# Patient Record
Sex: MALE | Race: OTHER | HISPANIC OR LATINO | Employment: UNEMPLOYED | URBAN - METROPOLITAN AREA
[De-identification: names, ages, dates, MRNs, and addresses within clinical notes are randomized per-mention and may not be internally consistent; named-entity substitution may affect disease eponyms.]

---

## 2021-08-12 ENCOUNTER — HOSPITAL ENCOUNTER (INPATIENT)
Facility: HOSPITAL | Age: 26
LOS: 1 days | Discharge: HOME | DRG: 245 | End: 2021-08-13
Attending: EMERGENCY MEDICINE | Admitting: INTERNAL MEDICINE
Payer: COMMERCIAL

## 2021-08-12 ENCOUNTER — APPOINTMENT (EMERGENCY)
Dept: CT IMAGING | Facility: HOSPITAL | Age: 26
DRG: 245 | End: 2021-08-12
Payer: COMMERCIAL

## 2021-08-12 DIAGNOSIS — K52.9 PROCTOCOLITIS: Primary | ICD-10-CM

## 2021-08-12 DIAGNOSIS — Z59.00 HOMELESS: ICD-10-CM

## 2021-08-12 DIAGNOSIS — R11.0 NAUSEA: ICD-10-CM

## 2021-08-12 DIAGNOSIS — F19.10 POLYSUBSTANCE ABUSE (HCC): ICD-10-CM

## 2021-08-12 DIAGNOSIS — R93.5 ABNORMAL ABDOMINAL CT SCAN: ICD-10-CM

## 2021-08-12 DIAGNOSIS — R10.9 ABDOMINAL PAIN: ICD-10-CM

## 2021-08-12 DIAGNOSIS — R11.2 INTRACTABLE NAUSEA AND VOMITING: ICD-10-CM

## 2021-08-12 PROBLEM — K62.5 COLITIS WITH RECTAL BLEEDING: Status: ACTIVE | Noted: 2021-08-12

## 2021-08-12 PROBLEM — E87.6 HYPOKALEMIA: Status: ACTIVE | Noted: 2021-08-12

## 2021-08-12 PROBLEM — R79.89 ABNORMAL LFTS: Status: ACTIVE | Noted: 2021-08-12

## 2021-08-12 LAB
ALBUMIN SERPL BCP-MCNC: 4.4 G/DL (ref 3–5.2)
ALP SERPL-CCNC: 63 U/L (ref 43–122)
ALT SERPL W P-5'-P-CCNC: 81 U/L
AMPHETAMINES SERPL QL SCN: NEGATIVE
ANION GAP SERPL CALCULATED.3IONS-SCNC: 9 MMOL/L (ref 5–14)
AST SERPL W P-5'-P-CCNC: 56 U/L (ref 17–59)
BARBITURATES UR QL: NEGATIVE
BASOPHILS # BLD AUTO: 0 THOUSANDS/ΜL (ref 0–0.1)
BASOPHILS NFR BLD AUTO: 0 % (ref 0–1)
BENZODIAZ UR QL: NEGATIVE
BILIRUB SERPL-MCNC: 2.17 MG/DL
BILIRUB UR QL STRIP: NEGATIVE
BUN SERPL-MCNC: 11 MG/DL (ref 5–25)
CALCIUM SERPL-MCNC: 9 MG/DL (ref 8.4–10.2)
CHLORIDE SERPL-SCNC: 96 MMOL/L (ref 97–108)
CLARITY UR: ABNORMAL
CO2 SERPL-SCNC: 33 MMOL/L (ref 22–30)
COCAINE UR QL: POSITIVE
COLOR UR: ABNORMAL
CREAT SERPL-MCNC: 0.79 MG/DL (ref 0.7–1.5)
EOSINOPHIL # BLD AUTO: 0.1 THOUSAND/ΜL (ref 0–0.4)
EOSINOPHIL NFR BLD AUTO: 1 % (ref 0–6)
ERYTHROCYTE [DISTWIDTH] IN BLOOD BY AUTOMATED COUNT: 13.8 %
GFR SERPL CREATININE-BSD FRML MDRD: 126 ML/MIN/1.73SQ M
GLUCOSE SERPL-MCNC: 89 MG/DL (ref 70–99)
GLUCOSE UR STRIP-MCNC: NEGATIVE MG/DL
HCT VFR BLD AUTO: 46.3 % (ref 41–53)
HGB BLD-MCNC: 15.9 G/DL (ref 13.5–17.5)
HGB UR QL STRIP.AUTO: NEGATIVE
KETONES UR STRIP-MCNC: ABNORMAL MG/DL
LACTATE SERPL-SCNC: 0.7 MMOL/L (ref 0.7–2)
LEUKOCYTE ESTERASE UR QL STRIP: NEGATIVE
LIPASE SERPL-CCNC: 33 U/L (ref 23–300)
LYMPHOCYTES # BLD AUTO: 2.3 THOUSANDS/ΜL (ref 0.5–4)
LYMPHOCYTES NFR BLD AUTO: 19 % (ref 25–45)
MCH RBC QN AUTO: 33 PG (ref 26–34)
MCHC RBC AUTO-ENTMCNC: 34.3 G/DL (ref 31–36)
MCV RBC AUTO: 96 FL (ref 80–100)
METHADONE UR QL: NEGATIVE
MONOCYTES # BLD AUTO: 1.1 THOUSAND/ΜL (ref 0.2–0.9)
MONOCYTES NFR BLD AUTO: 9 % (ref 1–10)
NEUTROPHILS # BLD AUTO: 8.7 THOUSANDS/ΜL (ref 1.8–7.8)
NEUTS SEG NFR BLD AUTO: 71 % (ref 45–65)
NITRITE UR QL STRIP: NEGATIVE
OPIATES UR QL SCN: NEGATIVE
OXYCODONE+OXYMORPHONE UR QL SCN: NEGATIVE
PCP UR QL: NEGATIVE
PH UR STRIP.AUTO: 7 [PH]
PLATELET # BLD AUTO: 123 THOUSANDS/UL (ref 150–450)
PMV BLD AUTO: 10.3 FL (ref 8.9–12.7)
POTASSIUM SERPL-SCNC: 3.2 MMOL/L (ref 3.6–5)
PROT SERPL-MCNC: 8 G/DL (ref 5.9–8.4)
PROT UR STRIP-MCNC: NEGATIVE MG/DL
RBC # BLD AUTO: 4.81 MILLION/UL (ref 4.5–5.9)
SARS-COV-2 RNA RESP QL NAA+PROBE: NEGATIVE
SODIUM SERPL-SCNC: 138 MMOL/L (ref 137–147)
SP GR UR STRIP.AUTO: 1.02 (ref 1–1.04)
THC UR QL: POSITIVE
UROBILINOGEN UA: 8 MG/DL
WBC # BLD AUTO: 12.2 THOUSAND/UL (ref 4.5–11)

## 2021-08-12 PROCEDURE — 99223 1ST HOSP IP/OBS HIGH 75: CPT | Performed by: INTERNAL MEDICINE

## 2021-08-12 PROCEDURE — G1004 CDSM NDSC: HCPCS

## 2021-08-12 PROCEDURE — 74177 CT ABD & PELVIS W/CONTRAST: CPT

## 2021-08-12 PROCEDURE — 85025 COMPLETE CBC W/AUTO DIFF WBC: CPT | Performed by: PHYSICIAN ASSISTANT

## 2021-08-12 PROCEDURE — 81003 URINALYSIS AUTO W/O SCOPE: CPT | Performed by: PHYSICIAN ASSISTANT

## 2021-08-12 PROCEDURE — 80307 DRUG TEST PRSMV CHEM ANLYZR: CPT | Performed by: PHYSICIAN ASSISTANT

## 2021-08-12 PROCEDURE — 83690 ASSAY OF LIPASE: CPT | Performed by: PHYSICIAN ASSISTANT

## 2021-08-12 PROCEDURE — 36415 COLL VENOUS BLD VENIPUNCTURE: CPT | Performed by: PHYSICIAN ASSISTANT

## 2021-08-12 PROCEDURE — 87040 BLOOD CULTURE FOR BACTERIA: CPT | Performed by: PHYSICIAN ASSISTANT

## 2021-08-12 PROCEDURE — 99285 EMERGENCY DEPT VISIT HI MDM: CPT | Performed by: PHYSICIAN ASSISTANT

## 2021-08-12 PROCEDURE — 83605 ASSAY OF LACTIC ACID: CPT | Performed by: PHYSICIAN ASSISTANT

## 2021-08-12 PROCEDURE — 96361 HYDRATE IV INFUSION ADD-ON: CPT

## 2021-08-12 PROCEDURE — U0003 INFECTIOUS AGENT DETECTION BY NUCLEIC ACID (DNA OR RNA); SEVERE ACUTE RESPIRATORY SYNDROME CORONAVIRUS 2 (SARS-COV-2) (CORONAVIRUS DISEASE [COVID-19]), AMPLIFIED PROBE TECHNIQUE, MAKING USE OF HIGH THROUGHPUT TECHNOLOGIES AS DESCRIBED BY CMS-2020-01-R: HCPCS | Performed by: PHYSICIAN ASSISTANT

## 2021-08-12 PROCEDURE — 80053 COMPREHEN METABOLIC PANEL: CPT | Performed by: PHYSICIAN ASSISTANT

## 2021-08-12 PROCEDURE — 96375 TX/PRO/DX INJ NEW DRUG ADDON: CPT

## 2021-08-12 PROCEDURE — 96374 THER/PROPH/DIAG INJ IV PUSH: CPT

## 2021-08-12 PROCEDURE — 99285 EMERGENCY DEPT VISIT HI MDM: CPT

## 2021-08-12 PROCEDURE — U0005 INFEC AGEN DETEC AMPLI PROBE: HCPCS | Performed by: PHYSICIAN ASSISTANT

## 2021-08-12 RX ORDER — CIPROFLOXACIN 2 MG/ML
400 INJECTION, SOLUTION INTRAVENOUS EVERY 12 HOURS
Status: DISCONTINUED | OUTPATIENT
Start: 2021-08-12 | End: 2021-08-13

## 2021-08-12 RX ORDER — ONDANSETRON 2 MG/ML
4 INJECTION INTRAMUSCULAR; INTRAVENOUS EVERY 6 HOURS PRN
Status: DISCONTINUED | OUTPATIENT
Start: 2021-08-12 | End: 2021-08-13 | Stop reason: HOSPADM

## 2021-08-12 RX ORDER — CIPROFLOXACIN 2 MG/ML
400 INJECTION, SOLUTION INTRAVENOUS ONCE
Status: COMPLETED | OUTPATIENT
Start: 2021-08-12 | End: 2021-08-12

## 2021-08-12 RX ORDER — POTASSIUM CHLORIDE 750 MG/1
40 TABLET, EXTENDED RELEASE ORAL ONCE
Status: COMPLETED | OUTPATIENT
Start: 2021-08-12 | End: 2021-08-12

## 2021-08-12 RX ORDER — KETOROLAC TROMETHAMINE 30 MG/ML
15 INJECTION, SOLUTION INTRAMUSCULAR; INTRAVENOUS ONCE
Status: COMPLETED | OUTPATIENT
Start: 2021-08-12 | End: 2021-08-12

## 2021-08-12 RX ORDER — ACETAMINOPHEN 325 MG/1
650 TABLET ORAL EVERY 6 HOURS PRN
Status: DISCONTINUED | OUTPATIENT
Start: 2021-08-12 | End: 2021-08-13 | Stop reason: HOSPADM

## 2021-08-12 RX ORDER — SODIUM CHLORIDE 9 MG/ML
100 INJECTION, SOLUTION INTRAVENOUS CONTINUOUS
Status: DISCONTINUED | OUTPATIENT
Start: 2021-08-12 | End: 2021-08-13 | Stop reason: HOSPADM

## 2021-08-12 RX ORDER — PROMETHAZINE HYDROCHLORIDE 25 MG/ML
12.5 INJECTION, SOLUTION INTRAMUSCULAR; INTRAVENOUS ONCE
Status: COMPLETED | OUTPATIENT
Start: 2021-08-12 | End: 2021-08-12

## 2021-08-12 RX ADMIN — KETOROLAC TROMETHAMINE 15 MG: 30 INJECTION, SOLUTION INTRAMUSCULAR; INTRAVENOUS at 14:33

## 2021-08-12 RX ADMIN — SODIUM CHLORIDE 1000 ML: 0.9 INJECTION, SOLUTION INTRAVENOUS at 14:32

## 2021-08-12 RX ADMIN — IOHEXOL 100 ML: 350 INJECTION, SOLUTION INTRAVENOUS at 15:48

## 2021-08-12 RX ADMIN — POTASSIUM CHLORIDE 40 MEQ: 750 TABLET, EXTENDED RELEASE ORAL at 16:08

## 2021-08-12 RX ADMIN — METRONIDAZOLE 500 MG: 500 INJECTION, SOLUTION INTRAVENOUS at 20:15

## 2021-08-12 RX ADMIN — CIPROFLOXACIN 400 MG: 2 INJECTION, SOLUTION INTRAVENOUS at 20:51

## 2021-08-12 RX ADMIN — PROMETHAZINE HYDROCHLORIDE 12.5 MG: 25 INJECTION INTRAMUSCULAR; INTRAVENOUS at 14:34

## 2021-08-12 RX ADMIN — SODIUM CHLORIDE 125 ML/HR: 0.9 INJECTION, SOLUTION INTRAVENOUS at 18:14

## 2021-08-12 SDOH — ECONOMIC STABILITY - HOUSING INSECURITY: HOMELESSNESS UNSPECIFIED: Z59.00

## 2021-08-12 NOTE — ASSESSMENT & PLAN NOTE
Patient reports 3-4 days history of diarrhea with intermittent episode rectal bleeding  He denies having any history of inflammatory bowel disease    Likely could be secondary to infectious colitis  Follow up on stool enteric pathogen panel  Follow-up stool calprotectin  GI evaluation will be obtained

## 2021-08-12 NOTE — ASSESSMENT & PLAN NOTE
Patient presents with 3 days history of epigastric abdominal pain with resisted opening and intractable nausea and vomiting  Stated that the vomiting was nonbilious with occasional streaks of blood after multiple episodes  Denies any NSAID use or heavy alcohol use  Likely etiology could be gastritis, viral gastroenteritis or peptic ulcer disease  History of IV drug abuse  CT scan of the abdomen and pelvis showed proctocolitis  Lipase within normal limits  Continue with IV fluids  Clear liquid  IV antiemetics p r n    Trial of Protonix and Carafate

## 2021-08-12 NOTE — ASSESSMENT & PLAN NOTE
Patient admits to using IV heroin  Last use yesterday  Denies any fever but complains of chills  Follow-up on blood cultures    Follow up with HIV /hepatitis panel
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
## 2021-08-12 NOTE — ASSESSMENT & PLAN NOTE
LFTs elevated  Patient reports polysubstance abuse with heroin, cocaine and cannabinoids  Follow up on hepatitis panel and HIV  Follow-up repeat LFTs in a m
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
## 2021-08-13 ENCOUNTER — TELEPHONE (OUTPATIENT)
Dept: GASTROENTEROLOGY | Facility: MEDICAL CENTER | Age: 26
End: 2021-08-13

## 2021-08-13 VITALS
SYSTOLIC BLOOD PRESSURE: 116 MMHG | HEART RATE: 60 BPM | TEMPERATURE: 96.9 F | DIASTOLIC BLOOD PRESSURE: 66 MMHG | OXYGEN SATURATION: 98 % | WEIGHT: 154.76 LBS | HEIGHT: 65 IN | BODY MASS INDEX: 25.79 KG/M2 | RESPIRATION RATE: 18 BRPM

## 2021-08-13 LAB
ALBUMIN SERPL BCP-MCNC: 3.5 G/DL (ref 3–5.2)
ALP SERPL-CCNC: 45 U/L (ref 43–122)
ALT SERPL W P-5'-P-CCNC: 62 U/L
ANION GAP SERPL CALCULATED.3IONS-SCNC: 6 MMOL/L (ref 5–14)
AST SERPL W P-5'-P-CCNC: 48 U/L (ref 17–59)
BASOPHILS # BLD AUTO: 0 THOUSANDS/ΜL (ref 0–0.1)
BASOPHILS NFR BLD AUTO: 1 % (ref 0–1)
BILIRUB SERPL-MCNC: 2.11 MG/DL
BUN SERPL-MCNC: 9 MG/DL (ref 5–25)
CALCIUM SERPL-MCNC: 8.4 MG/DL (ref 8.4–10.2)
CHLORIDE SERPL-SCNC: 102 MMOL/L (ref 97–108)
CO2 SERPL-SCNC: 30 MMOL/L (ref 22–30)
CREAT SERPL-MCNC: 0.78 MG/DL (ref 0.7–1.5)
EOSINOPHIL # BLD AUTO: 0.1 THOUSAND/ΜL (ref 0–0.4)
EOSINOPHIL NFR BLD AUTO: 2 % (ref 0–6)
ERYTHROCYTE [DISTWIDTH] IN BLOOD BY AUTOMATED COUNT: 13.5 %
GFR SERPL CREATININE-BSD FRML MDRD: 126 ML/MIN/1.73SQ M
GLUCOSE SERPL-MCNC: 80 MG/DL (ref 70–99)
HCT VFR BLD AUTO: 42 % (ref 41–53)
HGB BLD-MCNC: 14.6 G/DL (ref 13.5–17.5)
HIV 1+2 AB+HIV1 P24 AG SERPL QL IA: NORMAL
HIV1 P24 AG SER QL: NORMAL
LYMPHOCYTES # BLD AUTO: 3.1 THOUSANDS/ΜL (ref 0.5–4)
LYMPHOCYTES NFR BLD AUTO: 37 % (ref 25–45)
MAGNESIUM SERPL-MCNC: 2 MG/DL (ref 1.6–2.3)
MCH RBC QN AUTO: 33.9 PG (ref 26–34)
MCHC RBC AUTO-ENTMCNC: 34.8 G/DL (ref 31–36)
MCV RBC AUTO: 98 FL (ref 80–100)
MONOCYTES # BLD AUTO: 0.9 THOUSAND/ΜL (ref 0.2–0.9)
MONOCYTES NFR BLD AUTO: 11 % (ref 1–10)
NEUTROPHILS # BLD AUTO: 4.2 THOUSANDS/ΜL (ref 1.8–7.8)
NEUTS SEG NFR BLD AUTO: 50 % (ref 45–65)
PLATELET # BLD AUTO: 92 THOUSANDS/UL (ref 150–450)
PLATELET BLD QL SMEAR: ABNORMAL
PMV BLD AUTO: 10.4 FL (ref 8.9–12.7)
POTASSIUM SERPL-SCNC: 3.9 MMOL/L (ref 3.6–5)
PROT SERPL-MCNC: 6.5 G/DL (ref 5.9–8.4)
RBC # BLD AUTO: 4.3 MILLION/UL (ref 4.5–5.9)
RBC MORPH BLD: NORMAL
SODIUM SERPL-SCNC: 138 MMOL/L (ref 137–147)
WBC # BLD AUTO: 8.4 THOUSAND/UL (ref 4.5–11)

## 2021-08-13 PROCEDURE — 87338 HPYLORI STOOL AG IA: CPT | Performed by: INTERNAL MEDICINE

## 2021-08-13 PROCEDURE — 87505 NFCT AGENT DETECTION GI: CPT | Performed by: INTERNAL MEDICINE

## 2021-08-13 PROCEDURE — 87806 HIV AG W/HIV1&2 ANTB W/OPTIC: CPT | Performed by: INTERNAL MEDICINE

## 2021-08-13 PROCEDURE — 87209 SMEAR COMPLEX STAIN: CPT | Performed by: INTERNAL MEDICINE

## 2021-08-13 PROCEDURE — 80053 COMPREHEN METABOLIC PANEL: CPT | Performed by: INTERNAL MEDICINE

## 2021-08-13 PROCEDURE — 83735 ASSAY OF MAGNESIUM: CPT | Performed by: INTERNAL MEDICINE

## 2021-08-13 PROCEDURE — 85025 COMPLETE CBC W/AUTO DIFF WBC: CPT | Performed by: INTERNAL MEDICINE

## 2021-08-13 PROCEDURE — 87177 OVA AND PARASITES SMEARS: CPT | Performed by: INTERNAL MEDICINE

## 2021-08-13 PROCEDURE — 99223 1ST HOSP IP/OBS HIGH 75: CPT | Performed by: INTERNAL MEDICINE

## 2021-08-13 PROCEDURE — 83993 ASSAY FOR CALPROTECTIN FECAL: CPT | Performed by: INTERNAL MEDICINE

## 2021-08-13 PROCEDURE — 99239 HOSP IP/OBS DSCHRG MGMT >30: CPT | Performed by: INTERNAL MEDICINE

## 2021-08-13 PROCEDURE — 80074 ACUTE HEPATITIS PANEL: CPT | Performed by: INTERNAL MEDICINE

## 2021-08-13 RX ORDER — CALCIUM CARBONATE 200(500)MG
1 TABLET,CHEWABLE ORAL 3 TIMES DAILY PRN
Qty: 30 TABLET | Refills: 0 | Status: SHIPPED | OUTPATIENT
Start: 2021-08-13

## 2021-08-13 RX ORDER — ONDANSETRON 4 MG/1
4 TABLET, ORALLY DISINTEGRATING ORAL EVERY 6 HOURS PRN
Qty: 20 TABLET | Refills: 0 | Status: SHIPPED | OUTPATIENT
Start: 2021-08-13

## 2021-08-13 RX ORDER — PANTOPRAZOLE SODIUM 40 MG/1
40 TABLET, DELAYED RELEASE ORAL DAILY
Qty: 30 TABLET | Refills: 0 | Status: SHIPPED | OUTPATIENT
Start: 2021-08-13

## 2021-08-13 RX ORDER — PANTOPRAZOLE SODIUM 40 MG/1
40 TABLET, DELAYED RELEASE ORAL
Status: DISCONTINUED | OUTPATIENT
Start: 2021-08-13 | End: 2021-08-13 | Stop reason: HOSPADM

## 2021-08-13 RX ADMIN — METRONIDAZOLE 500 MG: 500 INJECTION, SOLUTION INTRAVENOUS at 05:24

## 2021-08-13 RX ADMIN — SODIUM CHLORIDE 125 ML/HR: 0.9 INJECTION, SOLUTION INTRAVENOUS at 05:35

## 2021-08-13 NOTE — ASSESSMENT & PLAN NOTE
Patient presents with 3 days history of epigastric abdominal pain with resisted opening and intractable nausea and vomiting  Stated that the vomiting was nonbilious with occasional streaks of blood after multiple episodes  Denies any NSAID use or heavy alcohol use  Likely etiology could be gastritis, viral gastroenteritis or peptic ulcer disease  History of IV drug abuse  CT scan of the abdomen and pelvis showed proctocolitis  Lipase within normal limits  Patient maintained on IV fluids and tolerated clear liquid diet without any nausea or vomiting since admission  His epigastric pain has improved  Patient seen by Gastroenterology recommended stool for H pylori antigen which was not drawn as patient did not have any bowel movement dizziness hospitalization  He was started on Protonix 40 mg daily  Was also discharged on Protonix and p r n  Zofran  Recommended outpatient GI follow-up for EGD and colonoscopy  He has a scheduled appointment with Gastroenterology on 09/08    Recommended to continue non ulcerogenic diet and to avoid NSAIDs and alcohol use

## 2021-08-13 NOTE — ASSESSMENT & PLAN NOTE
Patient admits to using IV heroin  Last use yesterday  Denies any fever but complains of chills  Follow-up on blood cultures  HIV negative    Hepatitis panel pending

## 2021-08-13 NOTE — ASSESSMENT & PLAN NOTE
Patient reports 3-4 days history of diarrhea with intermittent episode rectal bleeding  He denies having any history of inflammatory bowel disease  Likely could be secondary to infectious colitis  Patient has not had any diarrhea rectal bleeding since admission  Evaluate by Gastroenterology who recommended outpatient EGD and possible colonoscopy  Stool studies were not sent as patient did not have any bowel movement during his hospitalization  His diet was advanced which he continued to tolerate    GI did not recommend any additional antibiotics on discharge
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
## 2021-08-13 NOTE — INCIDENTAL FINDINGS
The following findings require follow up:  Radiographic finding   Finding:  Proctocolitis   Follow up required:  GI follow-up Follow up should be done within 2-4 week(s)    Please notify the following clinician to assist with the follow up:   Dr Ran Byrne

## 2021-08-13 NOTE — TELEPHONE ENCOUNTER
----- Message from Nakul Green MD sent at 8/13/2021  9:18 AM EDT -----  Hello, please schedule GI follow up for this patient  Thanks

## 2021-08-13 NOTE — PLAN OF CARE
Problem: PAIN - ADULT  Goal: Verbalizes/displays adequate comfort level or baseline comfort level  Description: Interventions:  - Encourage patient to monitor pain and request assistance  - Assess pain using appropriate pain scale  - Administer analgesics based on type and severity of pain and evaluate response  - Implement non-pharmacological measures as appropriate and evaluate response  - Consider cultural and social influences on pain and pain management  - Notify physician/advanced practitioner if interventions unsuccessful or patient reports new pain  8/12/2021 2329 by Loreto Mtz RN  Outcome: Progressing  8/12/2021 2329 by Loreto Mtz RN  Outcome: Progressing     Problem: INFECTION - ADULT  Goal: Absence or prevention of progression during hospitalization  Description: INTERVENTIONS:  - Assess and monitor for signs and symptoms of infection  - Monitor lab/diagnostic results  - Monitor all insertion sites, i e  indwelling lines, tubes, and drains  - Monitor endotracheal if appropriate and nasal secretions for changes in amount and color  - [place] appropriate cooling/warming therapies per order  - Administer medications as ordered  - Instruct and encourage patient and family to use good hand hygiene technique  - Identify and instruct in appropriate isolation precautions for identified infection/condition  8/12/2021 2329 by Loreto Mtz RN  Outcome: Progressing  8/12/2021 2329 by Loreto Mtz RN  Outcome: Progressing     Problem: SAFETY ADULT  Goal: Patient will remain free of falls  Description: INTERVENTIONS:  - Educate patient/family on patient safety including physical limitations  - Instruct patient to call for assistance with activity   - Consult OT/PT to assist with strengthening/mobility   - Keep Call bell within reach  - Keep bed low and locked with side rails adjusted as appropriate  - Keep care items and personal belongings within reach  - Initiate and maintain comfort rounds  - Make Fall Risk Sign visible to staff  - Apply yellow socks and bracelet for high fall risk patients  - Consider moving patient to room near nurses station  8/12/2021 2329 by Navjot Quick RN  Outcome: Progressing  8/12/2021 2329 by Navjot Quick RN  Outcome: Progressing     Problem: DISCHARGE PLANNING  Goal: Discharge to home or other facility with appropriate resources  Description: INTERVENTIONS:  - Identify barriers to discharge w/patient and caregiver  - Arrange for needed discharge resources and transportation as appropriate  - Identify discharge learning needs (meds, wound care, etc )  - Arrange for interpretive services to assist at discharge as needed  - Refer to Case Management Department for coordinating discharge planning if the patient needs post-hospital services based on physician/advanced practitioner order or complex needs related to functional status, cognitive ability, or social support system  8/12/2021 2329 by Navjot Quick RN  Outcome: Progressing  8/12/2021 2329 by Navjot Quick RN  Outcome: Progressing     Problem: Knowledge Deficit  Goal: Patient/family/caregiver demonstrates understanding of disease process, treatment plan, medications, and discharge instructions  Description: Complete learning assessment and assess knowledge base    Interventions:  - Provide teaching at level of understanding  - Provide teaching via preferred learning methods  8/12/2021 2329 by Navjot Quick RN  Outcome: Progressing  8/12/2021 2329 by Navjot Quick RN  Outcome: Progressing     Problem: GASTROINTESTINAL - ADULT  Goal: Minimal or absence of nausea and/or vomiting  Description: INTERVENTIONS:  - Administer IV fluids if ordered to ensure adequate hydration  - Maintain NPO status until nausea and vomiting are resolved  - Nasogastric tube if ordered  - Administer ordered antiemetic medications as needed  - Provide nonpharmacologic comfort measures as appropriate  - Advance diet as tolerated, if ordered  - Consider nutrition services referral to assist patient with adequate nutrition and appropriate food choices  Outcome: Progressing

## 2021-08-13 NOTE — CASE MANAGEMENT
Discharge planning     CM was notified that pt will be discharging today  CM met with pt at bedside to discuss discharge planning  Pt is homeless, pt stated he is homeless by choice, he stated that [place] and will return to his previous environment  CM was notified pt will need medication  [address] stated that pt has co pay of $25 14  CM asked pt if he will be able to pay for his medication, Pt stated he does not have money  CM sent email to hospital financial counselors for [employer] RX coverage, pending response  TYLER filled Financial assistance eligible services request form for pt's co payments  CM faxed the forms to pharmacy Fax num: [phone]  Transportation: Pt stated he will walk to his destination       CM department will continue to follow through pt's D/C

## 2021-08-13 NOTE — DISCHARGE INSTRUCTIONS
Náusea y vómito severo, cuidados ambulatorios   INFORMACIÓN GENERAL:   La náusea y vómito severo  empieza de repente, empeora rápidamente y dura un corto periodo de Mely  La náusea y el vómito pueden ser causados por el embarazo, el alcohol, kristina infección o medicamentos  Síntomas relacionados comunes incluyen los siguientes:   · Fiebre    · Inflamación abdominal    · Dolor, sensibilidad o un bulto en el abdomen    · Sonidos salpicantes que se escuchan en lemus estómago cuando usted se mueve  Busque cuidados inmediatos para los siguientes síntomas:   · Joon en lemus vómito o heces    · Dolor repentino y severo en el pecho y parte superior del abdomen después de alcides vomitado muy mynor    · Mareos, sequedad en la boca y sed    · Muy poca orina o ausencia completa de la misma    · Debilidad muscular, calambres musculares y dificultad para respirar    · Latidos cardíacos más rápidos de lo normal    · Vómito por más de 50 horas  El tratamiento para la náusea y el vómito severo  puede incluir medicamentos para calmar lemus estómago y parar el vómito  Es probable que usted necesite de líquidos intravenosos si está deshidratado  Maneje lemus náusea y vómito:   · Jasper líquidos según indicaciones, para prevenir la deshidratación  Pregunte cuánto líquido rui [employer] neptali y cuáles líquidos son mejores para usted  Es probable que usted necesite rui un líquido de rehidratación oral  Caron líquido contiene agua, sales y azúcares que son todos necesarios para reemplazar los líquidos que el cuerpo ha perdido  Pregunte qué tipo de líquidos de rehidratación oral rui, cuánto rui y dónde conseguirlos  · Consuma comidas pequeñas con más frecuencia  Consuma cantidades pequeñas de alimentos cada 2 o 3 horas aunque no sienta hambre  Los alimentos en lemus estómago pueden llegar a [employer]  · Evite el estrés  Busque formas de relajarse y [place] lemus estrés   Los moises de asael debidos al estrés pueden incluso causar náusea y vómito  Descanse y [employer]  Programe kristina kenyon con morse proveedor de margareth según indicaciones:  Anote vianca preguntas para que las recuerde ren vianca citas de seguimiento  ACUERDOS SOBRE MORSE CUIDADO:   Usted tiene el derecho de participar en la planificación de morse cuidado  Aprenda todo lo que pueda sobre morse condición y mellisa darle tratamiento  Discuta con vianca médicos vianca opciones de tratamiento para juntos decidir el cuidado que usted quiere recibir  Usted siempre tiene el derecho a rechazar morse tratamiento  Esta información es sólo para uso en educación  Morse intención no es darle un consejo médico sobre enfermedades o tratamientos  Colsulte con morse Vandana Cabot farmacéutico antes de seguir cualquier régimen médico para saber si es seguro y efectivo para usted  © 2014 0[address] Terri [employer]gar[employer]e is for End User's use only and may not be sold, redistributed or otherwise used for commercial purposes  All illustrations and images included in CareNotes® are the copyrighted property of A D A M , Inc  or Brenden Velarde

## 2021-08-13 NOTE — CONSULTS
Consultation - [address] [facility]  Jean-Claude Perry 26 y o  male MRN: [id number]  Unit/Bed#: 7T [facility] 714-02 Encounter: [id number]        [address]    Reason for Consult / Principal Problem:   Nausea, vomiting, diarrhea, abdominal pain  ASSESSMENT AND PLAN:    This is a 27 yo male presenting with several days of nausea, vomiting, diarrhea, and epigastric abdominal pain  CT with possible rectosigmoiditis  Now improved  DDx includes acute viral gastroenteritis, PUD, H  pylori infection, side effects from marijuana, cocaine  Rec:  - Check stool for H  pylori antigen (ordered)  - Once H  pylori antigen sample is collected, start omeprazole 40 mg daily  - If he has more diarrhea, check stool for C diff, other enteric bacteria, o&p, and calprotectin  - IV hydration  - I do not think that antibiotics are indicated  - Avoid NSAIDs  - Advance diet as tolerated  - He should have outpatient follow up for EGD and colonoscopy  ______________________________________________________________________    HPI:  This is a 27 yo male with cocaine and marijuana use presenting with 3 days of epigastric pain, nausea, and vomiting  He also reports several episodes of diarrhea  He denied rectal bleeding when I spoke to him  He did not have a fever or upper respiratory symptoms and tested negative for COVID  On presentation, LFTs and lipase were normal   He had mild leukocytosis, now resolved  CT showed questionable thickening in the rectosigmoid colon  Tox screen positive for cocaine and marijuana  This morning, he reports feeling much improved  Rates pain as 2/10 and has no nausea  REVIEW OF SYSTEMS:    CONSTITUTIONAL: Denies any fever, chills, rigors, and weight loss  HEENT: No earache or tinnitus  Denies hearing loss or visual disturbances  CARDIOVASCULAR: No chest pain or palpitations     RESPIRATORY: Denies any cough, hemoptysis, shortness of breath or dyspnea on exertion  GASTROINTESTINAL: As noted in the History of Present Illness  GENITOURINARY: No problems with urination  Denies any hematuria or dysuria  NEUROLOGIC: No dizziness or vertigo, denies headaches  MUSCULOSKELETAL: Denies any muscle or joint pain  SKIN: Denies skin rashes or itching  ENDOCRINE: Denies excessive thirst  Denies intolerance to heat or cold  PSYCHOSOCIAL: Denies depression or anxiety  Denies any recent memory loss  Historical Information   History reviewed  No pertinent past medical history  History reviewed  No pertinent surgical history  Social History   Social History     Substance and Sexual Activity   Alcohol Use Not Currently     Social History     Substance and Sexual Activity   Drug Use Yes    Types: Marijuana, Cocaine     Social History     Tobacco Use   Smoking Status Current Every Day Smoker    Packs/day: 0 50   Smokeless Tobacco Never Used     History reviewed  No pertinent family history  Meds/Allergies     No medications prior to admission  Current Facility-Administered Medications   Medication Dose Route Frequency    acetaminophen (TYLENOL) tablet 650 mg  650 mg Oral Q6H PRN    ciprofloxacin (CIPRO) IVPB (premix in 5% dextrose) 400 mg 200 mL  400 mg Intravenous Q12H    metroNIDAZOLE (FLAGYL) IVPB (premix) 500 mg 100 mL  500 mg Intravenous Q8H    ondansetron (ZOFRAN) injection 4 mg  4 mg Intravenous Q6H PRN    sodium chloride 0 9 % infusion  125 mL/hr Intravenous Continuous       No Known Allergies        Objective     Blood pressure 116/66, pulse 60, temperature (!) 96 9 °F (36 1 °C), temperature source Temporal, resp  rate 18, height 5' 5" (1 651 m), weight 70 2 kg (154 lb 12 2 oz), SpO2 98 %  Body mass index is 25 75 kg/m²        Intake/Output Summary (Last 24 hours) at 8/13/2021 0904  Last data filed at 8/12/2021 1603  Gross per 24 hour   Intake 1000 ml   Output --   Net 1000 ml         PHYSICAL EXAM:      General Appearance:   Alert, cooperative, no distress   HEENT:   Normocephalic, atraumatic, anicteric      Neck:  Supple, symmetrical, trachea midline   Lungs:   Clear to auscultation bilaterally; no rales, rhonchi or wheezing; respirations unlabored    Heart[de-identified]   Regular rate and rhythm; no murmur, rub, or gallop     Abdomen:   Soft, mild epigastric ttp, non-distended; normal bowel sounds; no masses, no organomegaly    Genitalia:   Deferred    Rectal:   Deferred    Extremities:  No cyanosis, clubbing or edema    Pulses:  2+ and symmetric all extremities    Skin:  No jaundice, rashes, or lesions    Lymph nodes:  No palpable cervical lymphadenopathy        Lab Results:   Admission on 08/12/2021   Component Date Value    WBC 08/12/2021 12 20*    RBC 08/12/2021 4 81     Hemoglobin 08/12/2021 15 9     Hematocrit 08/12/2021 46 3     MCV 08/12/2021 96     MCH 08/12/2021 33 0     MCHC 08/12/2021 34 3     RDW 08/12/2021 13 8     MPV 08/12/2021 10 3     Platelets 76/83/3392 123*    Neutrophils Relative 08/12/2021 71*    Lymphocytes Relative 08/12/2021 19*    Monocytes Relative 08/12/2021 9     Eosinophils Relative 08/12/2021 1     Basophils Relative 08/12/2021 0     Neutrophils Absolute 08/12/2021 8 70*    Lymphocytes Absolute 08/12/2021 2 30     Monocytes Absolute 08/12/2021 1 10*    Eosinophils Absolute 08/12/2021 0 10     Basophils Absolute 08/12/2021 0 00     Sodium 08/12/2021 138     Potassium 08/12/2021 3 2*    Chloride 08/12/2021 96*    CO2 08/12/2021 33*    ANION GAP 08/12/2021 9     BUN 08/12/2021 11     Creatinine 08/12/2021 0 79     Glucose 08/12/2021 89     Calcium 08/12/2021 9 0     AST 08/12/2021 56     ALT 08/12/2021 81*    Alkaline Phosphatase 08/12/2021 63     Total Protein 08/12/2021 8 0     Albumin 08/12/2021 4 4     Total Bilirubin 08/12/2021 2 17*    eGFR 08/12/2021 126     Lipase 08/12/2021 33     LACTIC ACID 08/12/2021 0 7     Color, UA 08/12/2021 Martha*    Clarity,  08/12/2021 Slightly Cloudy*    Specific Gravity, UA 08/12/2021 1 020     pH, UA 08/12/2021 7 0     Leukocytes, UA 08/12/2021 Negative     Nitrite, UA 08/12/2021 Negative     Protein, UA 08/12/2021 Negative     Glucose, UA 08/12/2021 Negative     Ketones, UA 08/12/2021 5 (Trace)*    Bilirubin, UA 08/12/2021 Negative     Blood, UA 08/12/2021 Negative     UROBILINOGEN UA 08/12/2021 8 0*    SARS-CoV-2 08/12/2021 Negative     Amph/Meth UR 08/12/2021 Negative     Barbiturate Ur 08/12/2021 Negative     Benzodiazepine Urine 08/12/2021 Negative     Cocaine Urine 08/12/2021 Positive*    Methadone Urine 08/12/2021 Negative     Opiate Urine 08/12/2021 Negative     PCP Ur 08/12/2021 Negative     THC Urine 08/12/2021 Positive*    Oxycodone Urine 08/12/2021 Negative     Blood Culture 08/12/2021 Received in Microbiology Lab  Culture in Progress   Blood Culture 08/12/2021 Received in Microbiology Lab  Culture in Progress       Rapid HIV 1 AND 2 08/13/2021 Non-Reactive     HIV-1 P24 Ag Screen 08/13/2021 Non-Reactive     Sodium 08/13/2021 138     Potassium 08/13/2021 3 9     Chloride 08/13/2021 102     CO2 08/13/2021 30     ANION GAP 08/13/2021 6     BUN 08/13/2021 9     Creatinine 08/13/2021 0 78     Glucose 08/13/2021 80     Calcium 08/13/2021 8 4     AST 08/13/2021 48     ALT 08/13/2021 62*    Alkaline Phosphatase 08/13/2021 45     Total Protein 08/13/2021 6 5     Albumin 08/13/2021 3 5     Total Bilirubin 08/13/2021 2 11*    eGFR 08/13/2021 126     Magnesium 08/13/2021 2 0     WBC 08/13/2021 8 40     RBC 08/13/2021 4 30*    Hemoglobin 08/13/2021 14 6     Hematocrit 08/13/2021 42 0     MCV 08/13/2021 98     MCH 08/13/2021 33 9     MCHC 08/13/2021 34 8     RDW 08/13/2021 13 5     MPV 08/13/2021 10 4     Platelets 83/28/1015 92*    Neutrophils Relative 08/13/2021 50     Lymphocytes Relative 08/13/2021 37     Monocytes Relative 08/13/2021 11*    Eosinophils Relative 08/13/2021 2     Basophils Relative 08/13/2021 1     Neutrophils Absolute 08/13/2021 4 20     Lymphocytes Absolute 08/13/2021 3 10     Monocytes Absolute 08/13/2021 0 90     Eosinophils Absolute 08/13/2021 0 10     Basophils Absolute 08/13/2021 0 00     RBC Morphology 08/13/2021 Normal     Platelet Estimate 84/12/2264 Decreased*       Imaging Studies: I have personally reviewed pertinent imaging studies

## 2021-08-13 NOTE — ASSESSMENT & PLAN NOTE
LFTs elevated  Patient reports polysubstance abuse with heroin, cocaine and cannabinoids  Follow up on HIV negative  Hepatitis panel pending    Patient counseled regarding stopping drug use    He is currently not interested in inpatient/ outpatient rehab

## 2021-08-14 LAB
CAMPYLOBACTER DNA SPEC NAA+PROBE: NORMAL
HAV IGM SER QL: ABNORMAL
HBV CORE IGM SER QL: ABNORMAL
HBV SURFACE AG SER QL: ABNORMAL
HCV AB SER QL: ABNORMAL
SALMONELLA DNA SPEC QL NAA+PROBE: NORMAL
SHIGA TOXIN STX GENE SPEC NAA+PROBE: NORMAL
SHIGELLA DNA SPEC QL NAA+PROBE: NORMAL

## 2021-08-15 LAB — H PYLORI AG STL QL IA: NEGATIVE

## 2021-08-16 NOTE — UTILIZATION REVIEW
Notification of Discharge   This is a Notification of Discharge from our facility [address]  Please be advised that this patient has been discharge from our facility  Below you will find the admission and discharge date and time including the patients disposition  UTILIZATION REVIEW CONTACT:  Rodri Vasquez  Utilization   Network Utilization Review Department  Phone: [phone] x carefully listen to the prompts  All voicemails are confidential   Email: [email or web address]     PHYSICIAN ADVISORY SERVICES:  FOR QKWC-[id number] REVIEW - MEDICAL NECESSITY DENIAL  Phone: [phone]  Fax: [phone]  Email: [email or web address]     PRESENTATION DATE: 8/12/2021  2:00 PM  OBERVATION ADMISSION DATE:   INPATIENT ADMISSION DATE: 8/12/21  4:36 PM   DISCHARGE DATE: 8/13/2021  1:54 PM  DISPOSITION: Home/Self Care Home/Self Care      IMPORTANT INFORMATION:  Send all requests for admission clinical reviews, approved or denied determinations and any other requests to dedicated fax number below belonging to the campus where the patient is receiving treatment   List of dedicated fax numbers:  [address] DENIALS (Administrative/Medical Necessity) [phone]   [address] [place] [address] (Maternity/NICU/Pediatrics) [phone]   [facility] [phone]   Bevin Angelucci [phone]   [address] [place] [address]  [phone]   Suellen Haley [facility] 1525 [facility] [phone]   [facility]  [phone]   [address] [facility], [address]  2401 [facility] 1000 [facility] [phone]

## 2021-08-16 NOTE — UTILIZATION REVIEW
Inpatient Admission Authorization Request   NOTIFICATION OF INPATIENT ADMISSION/INPATIENT AUTHORIZATION REQUEST   SERVICING FACILITY:   [address] [place] [address]  [address] , [facility], [address] [place] [address]  Tax ID: [id number]  NPI: [id number]  Place of Service: Inpatient [facility]  [address]  Place of Service Code: 24     ATTENDING PROVIDER:  Attending Name and NPI#: Parul Collier [[id number]]  Address: [address] , [facility], [address] [place] [address]  Phone: [phone]     UTILIZATION REVIEW CONTACT:  Shannan Danielson Utilization   Network Utilization Review Department  Phone: [phone]  Fax [phone]  Email: [email or web address]     PHYSICIAN ADVISORY SERVICES:  FOR IKAS-WM-WFIG REVIEW - MEDICAL NECESSITY DENIAL  Phone: [phone]  Fax: [phone]  Email: [email or web address]     TYPE OF REQUEST:  Inpatient Status     ADMISSION INFORMATION:  ADMISSION DATE/TIME: 8/12/21  4:36 PM  PATIENT DIAGNOSIS CODE/DESCRIPTION:  Proctocolitis [K52 9]  Nausea [R11 0]  Abdominal pain [R10 9]  Abnormal abdominal CT scan [R93 5]  Homeless [Z59 0]  Polysubstance abuse ([facility] [address] ) [F19 10]  DISCHARGE DATE/TIME: 8/13/2021  1:54 PM  DISCHARGE DISPOSITION (IF DISCHARGED): Home/Self Care     IMPORTANT INFORMATION:  Please contact the Shannan Danielson directly with any questions or concerns regarding this request  Department voicemails are confidential     Send requests for admission clinical reviews, concurrent reviews, approvals, and administrative denials due to lack of clinical to fax [phone]

## 2021-08-16 NOTE — UTILIZATION REVIEW
Initial Clinical Review    Admission: Date/Time/Statement:   Admission Orders (From admission, onward)     Ordered        08/12/21 1636  Inpatient Admission  Once                   Orders Placed This Encounter   Procedures    Inpatient Admission     Standing Status:   Standing     Number of Occurrences:   1     Order Specific Question:   Level of Care     Answer:   Med Surg [16]     Order Specific Question:   Estimated length of stay     Answer:   More than 2 Midnights     Order Specific Question:   Certification     Answer:   I certify that inpatient services are medically necessary for this patient for a duration of greater than two midnights  See H&P and MD Progress Notes for additional information about the patient's course of treatment  ED Arrival Information     Expected Arrival Acuity    - 8/12/2021 13:52 Urgent         Means of arrival Escorted by Service Admission type    [facility]-In [facility] Hospitalist Urgent         Arrival complaint    pain in testicles        Chief Complaint   Patient presents with    Abdominal Pain     x 3 days, vomiting  smokes weed daily      Initial Presentation:   26y Male to ED presents with abdominal pain x 3 to 4 days, epigastric, moderate to severe intensity burning  In addition having multiple episodes of diarrhea and rectal bleeding and streaks of blood  Admit to using IV heroin, cocaine and marijuana  Admit Inpatient level of care for Intractable nausea and vomiting, Hypokalemia, Abnormal LFTs, Colitis with rectal bleeding, IV Drug abuse and Homelessness  3 days history of epigastric abdominal pain with resisted opening and intractable nausea and vomiting  3-4 days history of diarrhea with intermittent episode rectal bleeding  Stated that the vomiting was nonbilious with occasional streaks of blood after multiple episodes  No use of NSAID  CT abd/pelvis showed proctocolitis  Continue IVFs  Clear liquid diet  Iv antiemetics prn   Trial of Protonix and Carafate  K 3 2 on admit and replace  F/u BMP in am 8/13  Elevated LFTs  Reports polysubstance abuse with heroin, cocaine and cannabinoids  F/u Hepatitis panel and HIV  F/u Repeat LFTs in am 8/13  Admit to IV heroin use, last used yesterday  F/u bld cultures, stool enteric pathogen panel, Stool calprotectin  GI consult  Date: 8/13   Day 2:   GI cons; Several days of nausea, vomiting, diarrhea, and epigastric abdominal pain  CT with possible rectosigmoiditis  Now improved  DDx includes acute viral gastroenteritis, PUD, H  pylori infection, side effects from marijuana, cocaine  Check stool for H  pylori antigen  Once H  pylori antigen sample is collected, start omeprazole 40 mg daily  If diarrhea persist, check stool for c diff, other enteric bacteria, o&p, and calprotectin  Continue IVFs  Hold on antibiotics for now  Avoid NSAIDs  Advance diet as tolerated  Will need Outpatient EGD and Colonscopy       ED Triage Vitals [08/12/21 1401]   Temperature Pulse Respirations Blood Pressure SpO2   99 1 °F (37 3 °C) 83 16 158/95 100 %      Temp Source Heart Rate Source Patient Position - Orthostatic VS BP Location FiO2 (%)   Tympanic Monitor Sitting Left arm --      Pain Score       7          Wt Readings from Last 1 Encounters:   08/12/21 70 2 kg (154 lb 12 2 oz)     Additional Vital Signs:   08/13/21 0709  96 9 °F (36 1 °C)  Abnormal   60  18  116/66  98 %  None (Room air)  Lying   08/12/21 2300  97 3 °F (36 3 °C)  Abnormal   67  18  143/78  96 %  None (Room air)  Lying   08/12/21 1803  98 2 °F (36 8 °C)  79  19  136/73  96 %  None (Room air)  Lying   08/12/21 1723  97 5 °F (36 4 °C)  72  20  145/100  97 %  None (Room air)         Pertinent Labs/Diagnostic Test Results:   8/12 CT abd/pelvis -   Results from last 7 days   Lab Units 08/12/21  1419   SARS-COV-2  Negative     Results from last 7 days   Lab Units 08/13/21  0533 08/12/21  1431   WBC Thousand/uL 8 40 12 20*   HEMOGLOBIN g/dL 14 6 15 9   HEMATOCRIT % 42 0 46 3   PLATELETS Thousands/uL 92* 123*   NEUTROS ABS Thousands/µL 4 20 8 70*         Results from last 7 days   Lab Units 08/13/21  0533 08/12/21  1431   SODIUM mmol/L 138 138   POTASSIUM mmol/L 3 9 3 2*   CHLORIDE mmol/L 102 96*   CO2 mmol/L 30 33*   ANION GAP mmol/L 6 9   BUN mg/dL 9 11   CREATININE mg/dL 0 78 0 79   EGFR ml/min/1 73sq m 126 126   CALCIUM mg/dL 8 4 9 0   MAGNESIUM mg/dL 2 0  --      Results from last 7 days   Lab Units 08/13/21  0533 08/12/21  1431   AST U/L 48 56   ALT U/L 62* 81*   ALK PHOS U/L 45 63   TOTAL PROTEIN g/dL 6 5 8 0   ALBUMIN g/dL 3 5 4 4   TOTAL BILIRUBIN mg/dL 2 11* 2 17*         Results from last 7 days   Lab Units 08/13/21  0533 08/12/21  1431   GLUCOSE RANDOM mg/dL 80 89       Results from last 7 days   Lab Units 08/12/21  1431   LACTIC ACID mmol/L 0 7       Results from last 7 days   Lab Units 08/13/21  0533   HEP B S AG  Non-reactive   HEP C AB  High Reactive*   HEP B C IGM  Non-reactive     Results from last 7 days   Lab Units 08/12/21  1431   LIPASE u/L 33       Results from last 7 days   Lab Units 08/12/21  1419   CLARITY UA  Slightly Cloudy*   COLOR UA  Martha*   SPEC GRAV UA  1 020   PH UA  7 0   GLUCOSE UA mg/dl Negative   KETONES UA mg/dl 5 (Trace)*   BLOOD UA  Negative   PROTEIN UA mg/dl Negative   NITRITE UA  Negative   BILIRUBIN UA  Negative   UROBILINOGEN UA mg/dL 8 0*   LEUKOCYTES UA  Negative             Results from last 7 days   Lab Units 08/12/21  1419   AMPH/METH  Negative   BARBITURATE UR  Negative   BENZODIAZEPINE UR  Negative   COCAINE UR  Positive*   METHADONE URINE  Negative   OPIATE UR  Negative   PCP UR  Negative   THC UR  Positive*             Results from last 7 days   Lab Units 08/13/21  1020   SALMONELLA SP PCR  None Detected   SHIGELLA SP/ENTEROINVASIVE E  COLI (EIEC)  None Detected   CAMPYLOBACTER SP (JEJUNI AND COLI)  None Detected   SHIGA TOXIN 1/SHIGA TOXIN 2  None Detected         Results from last 7 days   Lab Units 08/12/21  1714 08/12/21  1658   BLOOD CULTURE  No Growth at 72 hrs  No Growth at 72 hrs  ED Treatment:   Medication Administration from 08/12/2021 1352 to 08/12/2021 1743       Date/Time Order Dose Route Action     08/12/2021 1432 sodium chloride 0 9 % bolus 1,000 mL 1,000 mL Intravenous New Bag     08/12/2021 1434 promethazine (PHENERGAN) injection 12 5 mg 12 5 mg Intravenous Given     08/12/2021 1433 ketorolac (TORADOL) injection 15 mg 15 mg Intravenous Given     08/12/2021 1608 potassium chloride (K-DUR,KLOR-CON) CR tablet 40 mEq 40 mEq Oral Given     08/12/2021 1548 iohexol (OMNIPAQUE) 350 MG/ML injection (SINGLE-DOSE) 100 mL 100 mL Intravenous Given        History reviewed  No pertinent past medical history  Present on Admission:  **None**      Admitting Diagnosis: Proctocolitis [K52 9]  Nausea [R11 0]  Abdominal pain [R10 9]  Abnormal abdominal CT scan [R93 5]  Homeless [Z59 0]  Polysubstance abuse ([facility] [address] ) [F19 10]  Age/Sex: 26 y o  male     Admission Orders:  Scheduled Medications:  ciprofloxacin (CIPRO) IVPB (premix in 5% dextrose) 400 mg 200 mL   Dose: 400 mg  Freq: Once Route: IV x1 dose given  Last Dose: Stopped (08/12/21 2233)  Start: 08/12/21 1630 End: 08/12/21 2233    metroNIDAZOLE (FLAGYL) IVPB (premix) 500 mg 100 mL   Dose: 500 mg  Freq: Every 8 hours Route: IV  Last Dose: 500 mg (08/13/21 0524)  Start: 08/12/21 1815 End: 08/13/21 0919    Continuous IV Infusions:  sodium chloride 0 9 % infusion   Rate: 100 mL/hr Dose: 100 mL/hr  Freq: Continuous Route: IV  Last Dose: 100 mL/hr (08/13/21 0923)  Start: 08/12/21 1815 End: 08/13/21 1554    PRN Meds: None      IP CONSULT TO GASTROENTEROLOGY    Network Utilization Review Department  ATTENTION: Please call with any questions or concerns to [phone] and carefully listen to the prompts so that you are directed to the right person   All voicemails are confidential   Winston Valladares all requests for admission clinical reviews, approved or denied determinations and any other requests to dedicated fax number below belonging to the campus where the patient is receiving treatment   List of dedicated fax numbers for the Facilities:  [address] [place] [address] DENIALS (Administrative/Medical Necessity) [phone]   [address] (Maternity/NICU/Pediatrics) [phone]   [address] Mary Beatty [address] [place] [address] [place] [address] [phone]

## 2021-08-17 LAB — CALPROTECTIN STL-MCNT: 24 UG/G (ref 0–120)

## 2021-08-18 LAB
BACTERIA BLD CULT: NORMAL
BACTERIA BLD CULT: NORMAL
O+P STL CONC: NORMAL

## 2021-10-03 ENCOUNTER — APPOINTMENT (EMERGENCY)
Dept: CT IMAGING | Facility: HOSPITAL | Age: 26
End: 2021-10-03
Payer: COMMERCIAL

## 2021-10-03 ENCOUNTER — HOSPITAL ENCOUNTER (EMERGENCY)
Facility: HOSPITAL | Age: 26
Discharge: HOME | End: 2021-10-03
Attending: EMERGENCY MEDICINE
Payer: COMMERCIAL

## 2021-10-03 VITALS
HEART RATE: 75 BPM | BODY MASS INDEX: 24.32 KG/M2 | RESPIRATION RATE: 18 BRPM | TEMPERATURE: 97.6 F | SYSTOLIC BLOOD PRESSURE: 152 MMHG | DIASTOLIC BLOOD PRESSURE: 89 MMHG | WEIGHT: 146.13 LBS | OXYGEN SATURATION: 96 %

## 2021-10-03 DIAGNOSIS — R10.9 FLANK PAIN: Primary | ICD-10-CM

## 2021-10-03 LAB
BILIRUB UR QL STRIP: NEGATIVE
CLARITY UR: ABNORMAL
COLOR UR: YELLOW
GLUCOSE UR STRIP-MCNC: NEGATIVE MG/DL
HGB UR QL STRIP.AUTO: NEGATIVE
KETONES UR STRIP-MCNC: ABNORMAL MG/DL
LEUKOCYTE ESTERASE UR QL STRIP: NEGATIVE
NITRITE UR QL STRIP: NEGATIVE
PH UR STRIP.AUTO: 8 [PH]
PROT UR STRIP-MCNC: NEGATIVE MG/DL
SP GR UR STRIP.AUTO: 1.01 (ref 1–1.04)
UROBILINOGEN UA: NEGATIVE MG/DL

## 2021-10-03 PROCEDURE — 99284 EMERGENCY DEPT VISIT MOD MDM: CPT

## 2021-10-03 PROCEDURE — 81003 URINALYSIS AUTO W/O SCOPE: CPT | Performed by: EMERGENCY MEDICINE

## 2021-10-03 PROCEDURE — 99284 EMERGENCY DEPT VISIT MOD MDM: CPT | Performed by: EMERGENCY MEDICINE

## 2021-10-03 PROCEDURE — 74176 CT ABD & PELVIS W/O CONTRAST: CPT

## 2021-10-03 PROCEDURE — 96372 THER/PROPH/DIAG INJ SC/IM: CPT

## 2021-10-03 RX ORDER — NAPROXEN 500 MG/1
500 TABLET ORAL 2 TIMES DAILY WITH MEALS
Qty: 30 TABLET | Refills: 0 | Status: SHIPPED | OUTPATIENT
Start: 2021-10-03

## 2021-10-03 RX ORDER — CYCLOBENZAPRINE HCL 10 MG
10 TABLET ORAL 2 TIMES DAILY PRN
Qty: 20 TABLET | Refills: 0 | Status: SHIPPED | OUTPATIENT
Start: 2021-10-03

## 2021-10-03 RX ORDER — KETOROLAC TROMETHAMINE 30 MG/ML
30 INJECTION, SOLUTION INTRAMUSCULAR; INTRAVENOUS ONCE
Status: COMPLETED | OUTPATIENT
Start: 2021-10-03 | End: 2021-10-03

## 2021-10-03 RX ADMIN — KETOROLAC TROMETHAMINE 30 MG: 30 INJECTION, SOLUTION INTRAMUSCULAR; INTRAVENOUS at 03:55

## 2021-10-11 ENCOUNTER — HOSPITAL ENCOUNTER (EMERGENCY)
Facility: HOSPITAL | Age: 26
Discharge: LEFT BEFORE DISCHARGE | End: 2021-10-11
Attending: EMERGENCY MEDICINE | Admitting: INTERNAL MEDICINE
Payer: COMMERCIAL

## 2021-10-11 VITALS
BODY MASS INDEX: 25.29 KG/M2 | SYSTOLIC BLOOD PRESSURE: 132 MMHG | DIASTOLIC BLOOD PRESSURE: 79 MMHG | OXYGEN SATURATION: 100 % | TEMPERATURE: 98.9 F | WEIGHT: 152 LBS | HEART RATE: 102 BPM | RESPIRATION RATE: 18 BRPM

## 2021-10-11 DIAGNOSIS — F11.10 HEROIN ABUSE (HCC): Primary | ICD-10-CM

## 2021-10-11 LAB — ETHANOL EXG-MCNC: 0 MG/DL

## 2021-10-11 PROCEDURE — 99284 EMERGENCY DEPT VISIT MOD MDM: CPT

## 2021-10-11 PROCEDURE — 82075 ASSAY OF BREATH ETHANOL: CPT | Performed by: EMERGENCY MEDICINE

## 2021-10-11 PROCEDURE — U0005 INFEC AGEN DETEC AMPLI PROBE: HCPCS | Performed by: EMERGENCY MEDICINE

## 2021-10-11 PROCEDURE — U0003 INFECTIOUS AGENT DETECTION BY NUCLEIC ACID (DNA OR RNA); SEVERE ACUTE RESPIRATORY SYNDROME CORONAVIRUS 2 (SARS-COV-2) (CORONAVIRUS DISEASE [COVID-19]), AMPLIFIED PROBE TECHNIQUE, MAKING USE OF HIGH THROUGHPUT TECHNOLOGIES AS DESCRIBED BY CMS-2020-01-R: HCPCS | Performed by: EMERGENCY MEDICINE

## 2021-10-11 PROCEDURE — 99284 EMERGENCY DEPT VISIT MOD MDM: CPT | Performed by: EMERGENCY MEDICINE

## 2021-10-11 RX ORDER — NALOXONE HYDROCHLORIDE 1 MG/ML
INJECTION INTRAMUSCULAR; INTRAVENOUS; SUBCUTANEOUS
Status: COMPLETED
Start: 2021-10-11 | End: 2021-10-11

## 2021-10-11 RX ORDER — NALOXONE HYDROCHLORIDE 1 MG/ML
2 INJECTION INTRAMUSCULAR; INTRAVENOUS; SUBCUTANEOUS ONCE
Status: COMPLETED | OUTPATIENT
Start: 2021-10-11 | End: 2021-10-11

## 2021-10-11 RX ADMIN — NALOXONE HYDROCHLORIDE 2 MG: 1 INJECTION INTRAMUSCULAR; INTRAVENOUS; SUBCUTANEOUS at 18:58

## 2021-10-12 ENCOUNTER — TELEPHONE (OUTPATIENT)
Dept: EMERGENCY DEPT | Facility: HOSPITAL | Age: 26
End: 2021-10-12

## 2021-10-12 ENCOUNTER — HOSPITAL ENCOUNTER (EMERGENCY)
Facility: HOSPITAL | Age: 26
Discharge: LEFT BEFORE DISCHARGE | End: 2021-10-12
Attending: EMERGENCY MEDICINE
Payer: COMMERCIAL

## 2021-10-12 VITALS
TEMPERATURE: 98.2 F | BODY MASS INDEX: 24.8 KG/M2 | RESPIRATION RATE: 16 BRPM | DIASTOLIC BLOOD PRESSURE: 59 MMHG | SYSTOLIC BLOOD PRESSURE: 119 MMHG | WEIGHT: 149.03 LBS | HEART RATE: 64 BPM | OXYGEN SATURATION: 92 %

## 2021-10-12 DIAGNOSIS — F19.10 SUBSTANCE ABUSE (HCC): Primary | ICD-10-CM

## 2021-10-12 LAB — SARS-COV-2 RNA RESP QL NAA+PROBE: NEGATIVE

## 2021-10-12 PROCEDURE — 99284 EMERGENCY DEPT VISIT MOD MDM: CPT

## 2021-10-12 PROCEDURE — 99282 EMERGENCY DEPT VISIT SF MDM: CPT | Performed by: EMERGENCY MEDICINE

## 2022-05-31 ENCOUNTER — HOSPITAL ENCOUNTER (EMERGENCY)
Facility: HOSPITAL | Age: 27
Discharge: HOME | End: 2022-05-31
Attending: EMERGENCY MEDICINE | Admitting: EMERGENCY MEDICINE
Payer: COMMERCIAL

## 2022-05-31 VITALS
DIASTOLIC BLOOD PRESSURE: 68 MMHG | HEART RATE: 86 BPM | BODY MASS INDEX: 24.8 KG/M2 | TEMPERATURE: 98.6 F | SYSTOLIC BLOOD PRESSURE: 120 MMHG | HEIGHT: 65 IN | RESPIRATION RATE: 18 BRPM | OXYGEN SATURATION: 95 %

## 2022-05-31 DIAGNOSIS — F19.90 SUBSTANCE USE DISORDER: Primary | ICD-10-CM

## 2022-05-31 PROCEDURE — 99283 EMERGENCY DEPT VISIT LOW MDM: CPT

## 2022-05-31 PROCEDURE — 99284 EMERGENCY DEPT VISIT MOD MDM: CPT | Performed by: EMERGENCY MEDICINE

## 2022-05-31 RX ORDER — IBUPROFEN 600 MG/1
600 TABLET ORAL ONCE
Status: COMPLETED | OUTPATIENT
Start: 2022-05-31 | End: 2022-05-31

## 2022-05-31 RX ADMIN — IBUPROFEN 600 MG: 600 TABLET, FILM COATED ORAL at 19:35

## 2022-05-31 NOTE — ED NOTES
Called placed to [facility] with no answer  Voicemail left requesting return call for clarification of medical clearance given that pt has already completed intake        Ilsa Edwards RN  05/31/22 0363

## 2022-06-01 NOTE — DISCHARGE INSTRUCTIONS
Contact [facility] (number/information provided to you separately) to discuss your options for detox/rehab

## 2022-06-01 NOTE — ED NOTES
Second call placed to [facility], admissions stated that he had completed intake however was "denied and needs to seek out a Malay speaking facility"  MD notified        Girish Albarran RN  05/31/22 9517

## 2022-06-01 NOTE — ED CARE HANDOFF
Emergency Department Sign Out Note        Sign out and transfer of care from Dr Ramón Mckeon  See Separate Emergency Department note  The patient, Wojciech Shin, was evaluated by the previous provider for substance abuse/detox evaluation  Workup Completed:  Labs Reviewed - No data to display      ED Course / Workup Pending (followup)                                  ED Course as of 06/01/22 1141   Tue May 31, 2022   2239 Assumed care from Dr Ramón Mckeon  Pt here for detox eval  Pt reportedly called a rehab pta and states he was told to come to the ED to be picked up  Pt w/ no SI/HI  Nurses have tried contact the detox facility but no answer  2339 HOST unavailable tonight  Crisis spoke w/ HOST and was told to give pt a phone number to contact them tomorrow or he can stay overnight and speak to someone in the am  Patient wants to go home and contact HOST tomorrow am      Procedures  MDM        Disposition  Final diagnoses:   Substance use disorder     Time reflects when diagnosis was documented in both MDM as applicable and the Disposition within this note     Time User Action Codes Description Comment    5/31/2022 11:41 PM Yusuf Ovalle Add [F19 90] Substance use disorder       ED Disposition     ED Disposition   Discharge    Condition   Stable    Date/Time   Tue May 31, 2022 11:41 PM    Comment   Wojciech Shin discharge to home/self care                 Follow-up Information    None       Discharge Medication List as of 5/31/2022 11:43 PM      CONTINUE these medications which have NOT CHANGED    Details   calcium carbonate (TUMS) 500 mg chewable tablet Chew 1 tablet (500 mg total) 3 (three) times a day as needed for indigestion or heartburn, Starting Fri 8/13/2021, Normal      cyclobenzaprine (FLEXERIL) 10 mg tablet Take 1 tablet (10 mg total) by mouth 2 (two) times a day as needed for muscle spasms, Starting Sun 10/3/2021, Normal      naproxen (NAPROSYN) 500 mg tablet Take 1 tablet (500 mg total) by mouth 2 (two) times a day with meals, Starting Sun 10/3/2021, Normal      ondansetron (ZOFRAN-ODT) 4 mg disintegrating tablet Take 1 tablet (4 mg total) by mouth every 6 (six) hours as needed for nausea or vomiting, Starting Fri 8/13/2021, Normal      pantoprazole (PROTONIX) 40 mg tablet Take 1 tablet (40 mg total) by mouth daily, Starting Fri 8/13/2021, Normal           No discharge procedures on file         ED Provider  Electronically Signed by     James Stearns DO  06/01/22 4709

## 2022-06-01 NOTE — ED NOTES
Called [facility] to discuss this patient and Hall  Spoke with Terri Solano who looked up the patient in their system and said that the patient's report of needing to call [facility] for admission was from an assessment 8 months ago  Terri Solano further said that he is not taking any new referrals tonight, and directed us to give the patient the HOST contact number and tell him to call them tomorrow  Terri Solano said that since he has an open case with them and they have now been notified that he wants treatment again, the patient may contact them on his own from home tomorrow and they can assess and assist him then

## 2022-06-03 NOTE — ED PROVIDER NOTES
History  Chief Complaint   Patient presents with    Detox Evaluation     Arrives EMS after being found in an apartment complex acting bizarre  Pt admits to using fetanyl 2hr ago  Is looking to go back to rehab  States he already completed the intake for [facility] today  Denies SI/HI     22 YO male with Hx of drug abuse presents for bizarre behavior  Pt picked up in his apartment complex, on arrival to the ED Pt admits to using fentanyl ~2 hours PTA, he notes he is here because he was accepted to a rehab, [facility] and was instructed to come to the ED to be picked up  Pt offers no complaints on initial physician evaluation  Pt is AAOx4 and denies SI/HI  States he does wish to stop using  Pt denies CP/SOB/F/C/N/V/D/C, no dysuria, burning on urination or blood in urine  History provided by:  Patient, medical records and EMS personnel   used: No    Detox Evaluation  Similar prior episodes: yes    Severity:  Moderate  Onset quality:  Gradual  Timing:  Constant  Progression:  Unchanged  Chronicity:  Chronic  Suspected agents:  Opiates  Associated symptoms: no abdominal pain, no agitation, no confusion, no nausea, no shortness of breath, no vomiting and no weakness    Associated symptoms comment:  Bizarre behavior  Prior to Admission Medications   Prescriptions Last Dose Informant Patient Reported?  Taking?   calcium carbonate (TUMS) 500 mg chewable tablet   No No   Sig: Chew 1 tablet (500 mg total) 3 (three) times a day as needed for indigestion or heartburn   Patient not taking: Reported on 10/12/2021   cyclobenzaprine (FLEXERIL) 10 mg tablet   No No   Sig: Take 1 tablet (10 mg total) by mouth 2 (two) times a day as needed for muscle spasms   Patient not taking: Reported on 10/12/2021   naproxen (NAPROSYN) 500 mg tablet   No No   Sig: Take 1 tablet (500 mg total) by mouth 2 (two) times a day with meals   Patient not taking: Reported on 10/12/2021   ondansetron (ZOFRAN-ODT) 4 mg disintegrating tablet   No No   Sig: Take 1 tablet (4 mg total) by mouth every 6 (six) hours as needed for nausea or vomiting   Patient not taking: Reported on 10/12/2021   pantoprazole (PROTONIX) 40 mg tablet   No No   Sig: Take 1 tablet (40 mg total) by mouth daily   Patient not taking: Reported on 10/12/2021      Facility-Administered Medications: None       History reviewed  No pertinent past medical history  History reviewed  No pertinent surgical history  History reviewed  No pertinent family history  I have reviewed and agree with the history as documented  E-Cigarette/Vaping    E-Cigarette Use Never User      E-Cigarette/Vaping Substances    Nicotine No     THC No     CBD No     Flavoring No     Other No     Unknown No      Social History     Tobacco Use    Smoking status: Current Every Day Smoker     Packs/day: 0 50    Smokeless tobacco: Never Used   Vaping Use    Vaping Use: Never used   Substance Use Topics    Alcohol use: Not Currently    Drug use: Yes     Types: Marijuana, Cocaine, Heroin, Fentanyl       Review of Systems   Constitutional: Negative for fever  HENT: Negative for dental problem  Eyes: Negative for visual disturbance  Respiratory: Negative for shortness of breath  Cardiovascular: Negative for chest pain  Gastrointestinal: Negative for abdominal pain, nausea and vomiting  Genitourinary: Negative for dysuria and frequency  Musculoskeletal: Negative for neck pain and neck stiffness  Skin: Negative for rash  Neurological: Negative for dizziness, weakness and light-headedness  Psychiatric/Behavioral: Negative for agitation, behavioral problems and confusion  All other systems reviewed and are negative  Physical Exam  Physical Exam  Vitals and nursing note reviewed  Constitutional:       Appearance: He is well-developed  HENT:      Head: Normocephalic and atraumatic  Eyes:      Extraocular Movements: Extraocular movements intact  Cardiovascular:      Rate and Rhythm: Normal rate  Pulmonary:      Effort: Pulmonary effort is normal    Abdominal:      General: There is no distension  Musculoskeletal:         General: Normal range of motion  Cervical back: Normal range of motion  Skin:     Findings: No rash  Neurological:      Mental Status: He is alert and oriented to person, place, and time  Psychiatric:         Mood and Affect: Affect is inappropriate  Behavior: Behavior is hyperactive  Behavior is not agitated or aggressive  Thought Content: Thought content does not include homicidal or suicidal ideation  Vital Signs  ED Triage Vitals [05/31/22 1845]   Temperature Pulse Respirations Blood Pressure SpO2   98 6 °F (37 °C) 86 18 120/68 (!) 87 %      Temp Source Heart Rate Source Patient Position - Orthostatic VS BP Location FiO2 (%)   Oral Monitor Sitting Right arm --      Pain Score       No Pain           Vitals:    05/31/22 1845   BP: 120/68   Pulse: 86   Patient Position - Orthostatic VS: Sitting         Visual Acuity      ED Medications  Medications   ibuprofen (MOTRIN) tablet 600 mg (600 mg Oral Given 5/31/22 1935)       Diagnostic Studies  Results Reviewed     Procedure Component Value Units Date/Time    COVID/FLU/RSV - 2 hour TAT [[id number]]     Lab Status: No result Specimen: Nares from Nose                  No orders to display              Procedures  Procedures         ED Course                                             MDM  Number of Diagnoses or Management Options  Substance use disorder: new and requires workup  Diagnosis management comments: 1  Detox evaluation - Pt with Hx of opiate use disorder, states he is accepted to a rehab  Nurse with attempt to contact rehab  Pt is oriented and has no SI/HI, he can leave from the department if he wishes to          Amount and/or Complexity of Data Reviewed  Obtain history from someone other than the patient: yes  Discuss the patient with other providers: yes    Patient Progress  Patient progress: stable      Disposition  Final diagnoses:   Substance use disorder     Time reflects when diagnosis was documented in both MDM as applicable and the Disposition within this note     Time User Action Codes Description Comment    5/31/2022 11:41 PM Jesse Car Add [F19 90] Substance use disorder       ED Disposition     ED Disposition   Discharge    Condition   Stable    Date/Time   Tue May 31, 2022 11:41 PM    Comment   Dex Logan discharge to home/self care  Follow-up Information    None         Discharge Medication List as of 5/31/2022 11:43 PM      CONTINUE these medications which have NOT CHANGED    Details   calcium carbonate (TUMS) 500 mg chewable tablet Chew 1 tablet (500 mg total) 3 (three) times a day as needed for indigestion or heartburn, Starting Fri 8/13/2021, Normal      cyclobenzaprine (FLEXERIL) 10 mg tablet Take 1 tablet (10 mg total) by mouth 2 (two) times a day as needed for muscle spasms, Starting Sun 10/3/2021, Normal      naproxen (NAPROSYN) 500 mg tablet Take 1 tablet (500 mg total) by mouth 2 (two) times a day with meals, Starting Sun 10/3/2021, Normal      ondansetron (ZOFRAN-ODT) 4 mg disintegrating tablet Take 1 tablet (4 mg total) by mouth every 6 (six) hours as needed for nausea or vomiting, Starting Fri 8/13/2021, Normal      pantoprazole (PROTONIX) 40 mg tablet Take 1 tablet (40 mg total) by mouth daily, Starting Fri 8/13/2021, Normal             No discharge procedures on file      PDMP Review     None          ED Provider  Electronically Signed by           Debby Ramirez MD  06/02/22 5366

## 2022-08-24 ENCOUNTER — PATIENT OUTREACH (OUTPATIENT)
Dept: OTHER | Facility: OTHER | Age: 27
End: 2022-08-24

## 2022-08-24 NOTE — PROGRESS NOTES
8/24/22: Client requested COVID antigen test be performed  Performed test: result negative  Client informed  Discussed infection prevention information

## 2022-09-29 ENCOUNTER — HOSPITAL ENCOUNTER (EMERGENCY)
Facility: HOSPITAL | Age: 27
End: 2022-10-01
Attending: EMERGENCY MEDICINE
Payer: COMMERCIAL

## 2022-09-29 ENCOUNTER — HOSPITAL ENCOUNTER (EMERGENCY)
Facility: HOSPITAL | Age: 27
Discharge: LEFT BEFORE DISCHARGE | End: 2022-09-29
Attending: EMERGENCY MEDICINE
Payer: COMMERCIAL

## 2022-09-29 ENCOUNTER — HOSPITAL ENCOUNTER (EMERGENCY)
Facility: HOSPITAL | Age: 27
Discharge: LEFT BEFORE DISCHARGE | End: 2022-09-29
Attending: EMERGENCY MEDICINE
Payer: COMMERCIAL

## 2022-09-29 VITALS
HEART RATE: 100 BPM | SYSTOLIC BLOOD PRESSURE: 134 MMHG | BODY MASS INDEX: 23.53 KG/M2 | HEIGHT: 65 IN | TEMPERATURE: 98.2 F | DIASTOLIC BLOOD PRESSURE: 77 MMHG | WEIGHT: 141.2 LBS | OXYGEN SATURATION: 98 % | RESPIRATION RATE: 16 BRPM

## 2022-09-29 VITALS
TEMPERATURE: 98.1 F | DIASTOLIC BLOOD PRESSURE: 89 MMHG | RESPIRATION RATE: 18 BRPM | WEIGHT: 141.31 LBS | HEART RATE: 75 BPM | SYSTOLIC BLOOD PRESSURE: 142 MMHG | BODY MASS INDEX: 23.52 KG/M2 | OXYGEN SATURATION: 99 %

## 2022-09-29 DIAGNOSIS — R45.851 SUICIDAL IDEATION: ICD-10-CM

## 2022-09-29 DIAGNOSIS — F19.10 SUBSTANCE ABUSE (HCC): Primary | ICD-10-CM

## 2022-09-29 DIAGNOSIS — R45.851 SUICIDAL IDEATION: Primary | ICD-10-CM

## 2022-09-29 DIAGNOSIS — F14.10 COCAINE ABUSE (HCC): ICD-10-CM

## 2022-09-29 DIAGNOSIS — F11.10 HEROIN ABUSE (HCC): ICD-10-CM

## 2022-09-29 DIAGNOSIS — R45.89 THOUGHTS OF SELF HARM: ICD-10-CM

## 2022-09-29 DIAGNOSIS — Z00.8 ENCOUNTER FOR PSYCHOLOGICAL EVALUATION: ICD-10-CM

## 2022-09-29 LAB
ETHANOL EXG-MCNC: 0 MG/DL
SARS-COV-2 RNA RESP QL NAA+PROBE: NEGATIVE

## 2022-09-29 PROCEDURE — U0003 INFECTIOUS AGENT DETECTION BY NUCLEIC ACID (DNA OR RNA); SEVERE ACUTE RESPIRATORY SYNDROME CORONAVIRUS 2 (SARS-COV-2) (CORONAVIRUS DISEASE [COVID-19]), AMPLIFIED PROBE TECHNIQUE, MAKING USE OF HIGH THROUGHPUT TECHNOLOGIES AS DESCRIBED BY CMS-2020-01-R: HCPCS | Performed by: EMERGENCY MEDICINE

## 2022-09-29 PROCEDURE — 99285 EMERGENCY DEPT VISIT HI MDM: CPT

## 2022-09-29 PROCEDURE — 99285 EMERGENCY DEPT VISIT HI MDM: CPT | Performed by: EMERGENCY MEDICINE

## 2022-09-29 PROCEDURE — 99283 EMERGENCY DEPT VISIT LOW MDM: CPT

## 2022-09-29 PROCEDURE — 99284 EMERGENCY DEPT VISIT MOD MDM: CPT

## 2022-09-29 PROCEDURE — 99282 EMERGENCY DEPT VISIT SF MDM: CPT

## 2022-09-29 PROCEDURE — U0005 INFEC AGEN DETEC AMPLI PROBE: HCPCS | Performed by: EMERGENCY MEDICINE

## 2022-09-29 PROCEDURE — 82075 ASSAY OF BREATH ETHANOL: CPT | Performed by: EMERGENCY MEDICINE

## 2022-09-29 RX ORDER — DIAZEPAM 5 MG/1
10 TABLET ORAL ONCE
Status: CANCELLED | OUTPATIENT
Start: 2022-09-29 | End: 2022-09-29

## 2022-09-29 RX ORDER — NICOTINE 21 MG/24HR
21 PATCH, TRANSDERMAL 24 HOURS TRANSDERMAL ONCE
Status: DISCONTINUED | OUTPATIENT
Start: 2022-09-29 | End: 2022-09-29 | Stop reason: HOSPADM

## 2022-09-29 NOTE — ED PROVIDER NOTES
History  Chief Complaint   Patient presents with   • Detox Evaluation     States needs help with drugs; and feeling depressed over life; had thoughts of dying yesterday and continues; no plan; but has prior  Homeless and depressed  23 y o  M with no reported PMH presents to ED for detox evaluation  Currently homeless  believes they are following him and are out to hurt him  SI with no plan   Has tried suboxone before, is willing to try again  Last used a year ago  Was never prescribed  History provided by:  Patient   used: Yes    Psychiatric Evaluation  Presenting symptoms: agitation, depression, paranoid behavior and suicidal thoughts    Presenting symptoms: no hallucinations, no homicidal ideas, no suicidal threats and no suicide attempt    Context: drug abuse and noncompliance    Treatment compliance:  Untreated  Time since last dose of psychoactive medication: years ago  used to be on depakote, clonipin, seroquel   Associated symptoms: anxiety    Associated symptoms: no abdominal pain, no chest pain and no headaches    Risk factors: hx of mental illness (bipolar disorder, attention deficit, impulsive ) and hx of suicide attempts (tried to cut himself in [facility] when he was 26 )    Detox Evaluation  Timing: last used 8:30 AM    Suspected agents:  Heroin and cocaine (20 years  injects  8-10 bags each daily  )  Associated symptoms: agitation, confusion and suicidal ideation    Associated symptoms: no abdominal pain, no blackouts, no bladder incontinence, no bowel incontinence, no hallucinations, no headaches, no loss of consciousness, no nausea, no palpitations, no seizures, no shortness of breath, no violence, no vomiting and no weakness    Risk factors: addiction treatment (2 months ago, alone  )        Prior to Admission Medications   Prescriptions Last Dose Informant Patient Reported?  Taking?   calcium carbonate (TUMS) 500 mg chewable tablet   No No   Sig: Chew 1 tablet (500 mg total) 3 (three) times a day as needed for indigestion or heartburn   Patient not taking: Reported on 10/12/2021   cyclobenzaprine (FLEXERIL) 10 mg tablet   No No   Sig: Take 1 tablet (10 mg total) by mouth 2 (two) times a day as needed for muscle spasms   Patient not taking: Reported on 10/12/2021   naproxen (NAPROSYN) 500 mg tablet   No No   Sig: Take 1 tablet (500 mg total) by mouth 2 (two) times a day with meals   Patient not taking: Reported on 10/12/2021   ondansetron (ZOFRAN-ODT) 4 mg disintegrating tablet   No No   Sig: Take 1 tablet (4 mg total) by mouth every 6 (six) hours as needed for nausea or vomiting   Patient not taking: Reported on 10/12/2021   pantoprazole (PROTONIX) 40 mg tablet   No No   Sig: Take 1 tablet (40 mg total) by mouth daily   Patient not taking: Reported on 10/12/2021      Facility-Administered Medications: None       History reviewed  No pertinent past medical history  History reviewed  No pertinent surgical history  History reviewed  No pertinent family history  I have reviewed and agree with the history as documented  E-Cigarette/Vaping   • E-Cigarette Use Never User      E-Cigarette/Vaping Substances   • Nicotine No    • THC No    • CBD No    • Flavoring No    • Other No    • Unknown No      Social History     Tobacco Use   • Smoking status: Current Every Day Smoker     Packs/day: 0 50   • Smokeless tobacco: Never Used   Vaping Use   • Vaping Use: Never used   Substance Use Topics   • Alcohol use: Not Currently   • Drug use: Yes     Types: Marijuana, Cocaine, Heroin, Fentanyl       Review of Systems   Constitutional: Negative for chills and fever  HENT: Negative for congestion, rhinorrhea and sore throat  Eyes: Negative for visual disturbance  Respiratory: Negative for cough and shortness of breath  Cardiovascular: Negative for chest pain, palpitations and leg swelling     Gastrointestinal: Negative for abdominal pain, bowel incontinence, nausea and vomiting  Genitourinary: Positive for dysuria  Negative for bladder incontinence, decreased urine volume, hematuria, penile pain and testicular pain  Musculoskeletal: Positive for myalgias  Negative for neck stiffness  Skin: Negative for rash and wound  Neurological: Negative for dizziness, seizures, loss of consciousness, syncope, weakness and headaches  Psychiatric/Behavioral: Positive for agitation, confusion, paranoia and suicidal ideas  Negative for hallucinations and homicidal ideas  The patient is nervous/anxious  All other systems reviewed and are negative  Physical Exam  Physical Exam  Vitals reviewed  Constitutional:       General: He is not in acute distress  Appearance: He is not ill-appearing, toxic-appearing or diaphoretic  HENT:      Head: Normocephalic and atraumatic  Right Ear: External ear normal       Left Ear: External ear normal       Mouth/Throat:      Mouth: Mucous membranes are moist    Eyes:      General: No scleral icterus  Right eye: No discharge  Left eye: No discharge  Conjunctiva/sclera: Conjunctivae normal    Pulmonary:      Effort: Pulmonary effort is normal  No respiratory distress  Abdominal:      General: There is no distension  Musculoskeletal:      Cervical back: Neck supple  Skin:     General: Skin is warm and dry  Coloration: Skin is not jaundiced or pale  Neurological:      Mental Status: He is alert and oriented to person, place, and time  Gait: Gait (ambulated with steady gait ) normal    Psychiatric:         Attention and Perception: Attention normal          Mood and Affect: Affect is tearful  Speech: Speech is not slurred  Behavior: Behavior is not agitated or aggressive  Thought Content: Thought content is paranoid  Thought content includes suicidal ideation  Thought content does not include homicidal ideation  Thought content does not include homicidal or suicidal plan  Vital Signs  ED Triage Vitals [09/29/22 1446]   Temperature Pulse Respirations Blood Pressure SpO2   98 2 °F (36 8 °C) 100 16 134/77 98 %      Temp Source Heart Rate Source Patient Position - Orthostatic VS BP Location FiO2 (%)   Oral Monitor Sitting Left arm --      Pain Score       7           Vitals:    09/29/22 1446   BP: 134/77   Pulse: 100   Patient Position - Orthostatic VS: Sitting         Visual Acuity      ED Medications  Medications - No data to display    Diagnostic Studies  Results Reviewed     Procedure Component Value Units Date/Time    UA w Reflex to Microscopic w Reflex to Culture [[id number]]     Lab Status: No result Specimen: Urine     Chlamydia/GC amplified DNA by PCR [[id number]]     Lab Status: No result Specimen: Urine, Other     Rapid drug screen, urine [[id number]]     Lab Status: No result Specimen: Urine     POCT alcohol breath test [[id number]]     Lab Status: No result     COVID only [[id number]]     Lab Status: No result Specimen: Nares from Nose                  No orders to display              Procedures  Procedures         ED Course  ED Course as of 09/29/22 1600   Thu Sep 29, 2022   1519 Patient said he had to go to the bathroom and was directed toward the bathroom by other staff, I went to grab him a urine cup when I was told he was going to the bathroom, which is when we discovered he was not in the bathroom and had eloped  [employer] were called informed of elopement by nursing staff  Description given  MDM  Number of Diagnoses or Management Options  Substance abuse ([facility] )  Suicidal ideation  Diagnosis management comments: Workup for detox, BH, dysuria ordered  Patient with intermittent SI, no plan  Made Q7  Plan explained to patient, he said "okay, katherine"  Patient eloped prior to crisis evaluation, labs  [employer] was called by nursing staff, informed of elopement         Disposition  Final diagnoses:   Substance abuse ([facility] )   Suicidal ideation     Time reflects when diagnosis was documented in both MDM as applicable and the Disposition within this note     Time User Action Codes Description Comment    9/29/2022  3:22 PM Ector French Add [F19 10] Substance abuse ([address] )     9/29/2022  3:23 PM Ector French Add [Z84 665] Suicidal ideation       ED Disposition     ED Disposition   Psychiatric Elopement    Condition   --    Date/Time   Thu Sep 29, 2022  3:25 PM    Comment   Patient eloped  [employer]D called          Follow-up Information    None         Patient's Medications   Discharge Prescriptions    No medications on file       No discharge procedures on file      PDMP Review     None          ED Provider  Electronically Signed by           Jose Sifuentes PA-C  09/29/22 1600

## 2022-09-29 NOTE — ED NOTES
This nurse did not make initial pt contact  Pt ambulated to the bathroom with steady gait  Pt then eloped from department  [facility] and Mackenzie President from security aware  Staff attempted to find pt in department  [employer] was called and notified  [employer] to come to ER and search for pt in the area        Do Pathak RN  09/29/22 9810

## 2022-09-30 LAB
AMPHETAMINES SERPL QL SCN: NEGATIVE
BARBITURATES UR QL: NEGATIVE
BENZODIAZ UR QL: NEGATIVE
COCAINE UR QL: POSITIVE
METHADONE UR QL: NEGATIVE
OPIATES UR QL SCN: NEGATIVE
OXYCODONE+OXYMORPHONE UR QL SCN: NEGATIVE
PCP UR QL: NEGATIVE
THC UR QL: POSITIVE

## 2022-09-30 PROCEDURE — 80307 DRUG TEST PRSMV CHEM ANLYZR: CPT | Performed by: EMERGENCY MEDICINE

## 2022-09-30 PROCEDURE — 99243 OFF/OP CNSLTJ NEW/EST LOW 30: CPT | Performed by: STUDENT IN AN ORGANIZED HEALTH CARE EDUCATION/TRAINING PROGRAM

## 2022-09-30 RX ORDER — LORAZEPAM 1 MG/1
1 TABLET ORAL ONCE
Status: COMPLETED | OUTPATIENT
Start: 2022-09-30 | End: 2022-09-30

## 2022-09-30 RX ORDER — QUETIAPINE FUMARATE 50 MG/1
50 TABLET, FILM COATED ORAL
Status: DISCONTINUED | OUTPATIENT
Start: 2022-09-30 | End: 2022-10-01 | Stop reason: HOSPADM

## 2022-09-30 RX ADMIN — LORAZEPAM 1 MG: 1 TABLET ORAL at 15:34

## 2022-09-30 RX ADMIN — QUETIAPINE FUMARATE 50 MG: 50 TABLET ORAL at 22:45

## 2022-09-30 NOTE — ED PROVIDER NOTES
History  Chief Complaint   Patient presents with    Detox Evaluation     Patient wants detox, is homeless and then state's he wants mental health help  HPI    24 yo M presents to ed for psych eval and detox  Third ER visit today for same complaints  Did leave a few hours ago  SI HI: si no hi  Plan: cut himself  Any particular triggers?: homelessness  Hallucinations:  Denies    Guns at home:  denies   drugs:  yes  Alcohol: denies    previous hospitalizations: yes   previous suicide attempt:  yes   What psych meds does patient take: none  Any changes to those meds: no  Taking psych meds regularly: no  Would like to sign self in?: yes    Any Medical complaints? no      What drugs? Heroin cocaine  How much? Daily 8-10 bags  What method (i e IV, smoke, etc )? IV  History of withdrawal? yes  Alcohol use? Denies withdrawal    Would patient like rehab? Yes      Prior to Admission Medications   Prescriptions Last Dose Informant Patient Reported?  Taking?   calcium carbonate (TUMS) 500 mg chewable tablet Not Taking at Unknown time  No No   Sig: Chew 1 tablet (500 mg total) 3 (three) times a day as needed for indigestion or heartburn   Patient not taking: No sig reported   cyclobenzaprine (FLEXERIL) 10 mg tablet Not Taking at Unknown time  No No   Sig: Take 1 tablet (10 mg total) by mouth 2 (two) times a day as needed for muscle spasms   Patient not taking: No sig reported   naproxen (NAPROSYN) 500 mg tablet Not Taking at Unknown time  No No   Sig: Take 1 tablet (500 mg total) by mouth 2 (two) times a day with meals   Patient not taking: No sig reported   ondansetron (ZOFRAN-ODT) 4 mg disintegrating tablet Not Taking at Unknown time  No No   Sig: Take 1 tablet (4 mg total) by mouth every 6 (six) hours as needed for nausea or vomiting   Patient not taking: No sig reported   pantoprazole (PROTONIX) 40 mg tablet Not Taking at Unknown time  No No   Sig: Take 1 tablet (40 mg total) by mouth daily   Patient not taking: No sig reported      Facility-Administered Medications: None       History reviewed  No pertinent past medical history  History reviewed  No pertinent surgical history  History reviewed  No pertinent family history  I have reviewed and agree with the history as documented  E-Cigarette/Vaping    E-Cigarette Use Never User      E-Cigarette/Vaping Substances    Nicotine No     THC No     CBD No     Flavoring No     Other No     Unknown No      Social History     Tobacco Use    Smoking status: Current Every Day Smoker     Packs/day: 0 50    Smokeless tobacco: Never Used   Vaping Use    Vaping Use: Never used   Substance Use Topics    Alcohol use: Not Currently    Drug use: Yes     Types: Marijuana, Cocaine, Heroin, Fentanyl       Review of Systems   Constitutional: Negative for chills, fatigue and fever  HENT: Negative for nosebleeds and sore throat  Eyes: Negative for redness and visual disturbance  Respiratory: Negative for shortness of breath and wheezing  Cardiovascular: Negative for chest pain and palpitations  Gastrointestinal: Negative for abdominal pain and diarrhea  Endocrine: Negative for polyuria  Genitourinary: Negative for difficulty urinating and testicular pain  Musculoskeletal: Negative for back pain and neck stiffness  Skin: Negative for rash and wound  Neurological: Negative for seizures, speech difficulty and headaches  Psychiatric/Behavioral: Positive for dysphoric mood, self-injury and suicidal ideas  Negative for hallucinations  All other systems reviewed and are negative  Physical Exam  Physical Exam  Vitals and nursing note reviewed  Constitutional:       Appearance: He is well-developed  HENT:      Head: Normocephalic and atraumatic  Right Ear: External ear normal       Left Ear: External ear normal    Eyes:      Conjunctiva/sclera: Conjunctivae normal    Cardiovascular:      Rate and Rhythm: Normal rate and regular rhythm        Heart sounds: Normal heart sounds  Pulmonary:      Effort: Pulmonary effort is normal       Breath sounds: Normal breath sounds  No wheezing  Chest:      Chest wall: No tenderness  Abdominal:      General: Bowel sounds are normal       Palpations: Abdomen is soft  Tenderness: There is no abdominal tenderness  There is no guarding  Musculoskeletal:         General: Normal range of motion  Cervical back: Normal range of motion  Skin:     General: Skin is warm and dry  Findings: No rash  Comments: Multiple prior scars on right upper extremity   Neurological:      Mental Status: He is alert and oriented to person, place, and time  Cranial Nerves: No cranial nerve deficit  Sensory: No sensory deficit  Motor: No abnormal muscle tone  Coordination: Coordination normal    Psychiatric:         Thought Content: Thought content includes suicidal ideation  Vital Signs  ED Triage Vitals [09/29/22 2211]   Temperature Pulse Respirations Blood Pressure SpO2   (!) 96 1 °F (35 6 °C) 92 16 130/75 98 %      Temp Source Heart Rate Source Patient Position - Orthostatic VS BP Location FiO2 (%)   Tympanic Monitor Sitting Left arm --      Pain Score       No Pain           Vitals:    09/29/22 2211 09/30/22 0237   BP: 130/75 (!) 116/49   Pulse: 92 64   Patient Position - Orthostatic VS: Sitting Lying         Visual Acuity      ED Medications  Medications - No data to display    Diagnostic Studies  Results Reviewed     Procedure Component Value Units Date/Time    COVID only [[id number]]  (Normal) Collected: 09/29/22 2244    Lab Status: Final result Specimen: Nares from Nasopharyngeal Swab Updated: 09/29/22 2340     SARS-CoV-2 Negative    Narrative:      FOR PEDIATRIC PATIENTS - copy/paste COVID Guidelines URL to browser: [email or web address]    SARS-CoV-2 assay is a Nucleic Acid Amplification assay intended for the  qualitative detection of nucleic acid from SARS-CoV-2 in nasopharyngeal  swabs  Results are for the presumptive identification of SARS-CoV-2 RNA  Positive results are indicative of infection with SARS-CoV-2, the virus  causing COVID-19, but do not rule out bacterial infection or co-infection  with other viruses  Laboratories within the [place] and its  territories are required to report all positive results to the appropriate  public health authorities  Negative results do not preclude SARS-CoV-2  infection and should not be used as the sole basis for treatment or other  patient management decisions  Negative results must be combined with  clinical observations, patient history, and epidemiological information  This test has not been FDA cleared or approved  This test has been authorized by FDA under an Emergency Use Authorization  (EUA)  This test is only authorized for the duration of time the  declaration that circumstances exist justifying the authorization of the  emergency use of an in vitro diagnostic tests for detection of SARS-CoV-2  virus and/or diagnosis of COVID-19 infection under section 564(b)(1) of  the Act, 21 U  S C  487LDH-0(D)(3), unless the authorization is terminated  or revoked sooner  The test has been validated but independent review by FDA  and CLIA is pending  Test performed using [employer] Gene[employer]pert: This RT-PCR assay targets N2,  a region unique to SARS-CoV-2  A conserved region in the E-gene was chosen  for pan-Sarbecovirus detection which includes SARS-CoV-2  According to CMS-2020-01-R, this platform meets the definition of high-throughput technology      POCT alcohol breath test [[id number]]  (Normal) Resulted: 09/29/22 2245    Lab Status: Final result Updated: 09/29/22 2245     EXTBreath Alcohol 0 000    Rapid drug screen, urine [[id number]]     Lab Status: No result Specimen: Urine                  No orders to display              Procedures  Procedures         ED Course  ED Course as of 09/30/22 0601   Fri Sep 30, 2022   0341 Signed 201  SBIRT 20yo+    Flowsheet Row Most Recent Value   SBIRT (23 yo +)    In order to provide better care to our patients, we are screening all of our patients for alcohol and drug use  Would it be okay to ask you these screening questions? Yes Filed at: 09/29/2022 2304   Initial Alcohol Screen: US AUDIT-C     1  How often do you have a drink containing alcohol? 0 Filed at: 09/29/2022 2304   2  How many drinks containing alcohol do you have on a typical day you are drinking? 0 Filed at: 09/29/2022 2304   3a  Male UNDER 65: How often do you have five or more drinks on one occasion? 0 Filed at: 09/29/2022 2304   Audit-C Score 0 Filed at: 09/29/2022 2304   CURTIS: How many times in the past year have you    Used an illegal drug or used a prescription medication for non-medical reasons? Daily or Almost Daily Filed at: 09/29/2022 2304   DAST-10: In the past 12 months       1  Have you used drugs other than those required for medical reasons? 1 Filed at: 09/29/2022 2304   2  Do you use more than one drug at a time? 1 Filed at: 09/29/2022 2304   3  Have you had medical problems as a result of your drug use (e g , memory loss, hepatitis, convulsions, bleeding, etc )? 0 Filed at: 09/29/2022 2304   4  Have you had "blackouts" or "flashbacks" as a result of drug use? YesNo 1 Filed at: 09/29/2022 2304   5  Do you ever feel bad or guilty about your drug use? 1 Filed at: 09/29/2022 2304   6  Does your spouse (or parent) ever complain about your involvement with drugs? 0 Filed at: 09/29/2022 2304   7  Have you neglected your family because of your use of drugs? 0 Filed at: 09/29/2022 2304   8  Have you engaged in illegal activities in order to obtain drugs? 0 Filed at: 09/29/2022 2304   9  Have you ever experienced withdrawal symptoms (felt sick) when you stopped taking drugs? 1 Filed at: 09/29/2022 7363   10   Are you always able to stop using drugs when you want to? 1 Filed at: 09/29/2022 2304   DAST-10 Score 6 Filed at: 09/29/2022 2304                    MDM    Homelessness, Si  [facility] covid crisis  Signed 201  Pending placement  Signed out to Dr Winston Hightower  Disposition  Final diagnoses:   Suicidal ideation   Thoughts of self harm   Heroin abuse ([facility] )   [facility] ([facility] )     Time reflects when diagnosis was documented in both MDM as applicable and the Disposition within this note     Time User Action Codes Description Comment    9/29/2022 10:50 PM Benoit Marvin Add [S55 110] Suicidal ideation     9/29/2022 10:50 PM Amanda Vance, [address] [R45 89] Thoughts of self harm     9/29/2022 10:50 PM Amanda Vance, [address] [F11 10] Heroin abuse ([facility] )     9/29/2022 10:51 PM Benoit Marvin Add [F14 10] Cocaine abuse [facility])       ED Disposition     ED Disposition   Transfer to Behavioral Health    Condition   --    Date/Time   Thu Sep 29, 2022 10:50 PM    Comment   Amol Suero should be transferred out to [facility] and has been medically cleared  MD Documentation    Flowsheet Row Most Recent Value   Sending MD Zachary Quigley MD      Follow-up Information    None         Patient's Medications   Discharge Prescriptions    No medications on file       No discharge procedures on file      PDMP Review     None          ED Provider  Electronically Signed by           Zachary Quigley MD  09/30/22 2004

## 2022-09-30 NOTE — CONSULTS
Psychiatric Evaluation - [address] 23 y o  male MRN: [id number]  Unit/Bed#: I6Z5 Encounter: [id number]    Assessment and Plan     Assessment       Assessment:  Lina Vicente is a 23year old single male, homeless, with no income who presented to the ED several times in the last 24 hours seeking mental health treatment and drug rehabilitation  He is not currently expressing suicidal and homicidal ideations  He signed a 201 and that will be honored as we await placement  Patient is a high elopement risk as he left the ED multiple times in the last 24 hours  It was expressed to the patient that psychiatry would like to re-evaluate him prior to him leaving again in the future  If the patient becomes agitated and demonstrates poor impulse control and wants to leave, it would be warranted to petition a 36 for safety as the patient admitted to being severely depressed and a recent suicide attempt via drug overdose last week  Principal Psychiatric Problem:  1  Unspecified mood disorder  ([address] )  a  Differential: substance induced mood disorder vs  Substance induced psychosis vs  major depressive disorder vs  Bipolar disorder       Plan     Admission labs reviewed   Patient has signed 201 for voluntary admission, awaiting inpatient psychiatry placement   Collaborate with collaterals for baseline assessment and disposition as indicated  · Recommend starting Seroquel 100 mg daily for mood as patient was previously on this and had positive effects, with plan to up titrate to more effective dose   · Psychiatry recommending that patient stay for psychiatry treatment, and if patient would like to leave, we would like to at least re-evaluate patient in the morning   · Psychiatry will continue to follow as needed  Please contact our service via [employer] with any additional questions or concerns   If contacting after hours, please call or [facility] the on-call team ([facility]: [phone]) with any questions or concerns  Risks, benefits and possible side effects of Medications:   Risks, benefits, and possible side effects of medications explained to patient and patient verbalizes understanding  HPI   History of Present Illness     Physician Requesting Consult: Dr Lala Kapoor  Reason for Consult / Principal Problem: Suicidal Ideation     Chief Complaint: "I have thoughts of dying"    Chon Johnson is a 23 y o  male, possessing pertinent psychiatric history of IVDU, cocaine use disorder, heroin use disorder, , who presented with depression  He told the ED provider he was homeless and believes people are following him and are out to hurt him  He dexpress suicidal ideation "I have thoughts of dying yesterday" but has no plan  He is also seeking help with his drug use stating "I have tried suboxone before and I am willing to try again, the last time I used it was a year ago, but I was never prescribed it"  A  was used during the interview "Segundo Ferrari [id number]"  During the interview today the patient admits to wanting to hurt himself and suffering from addiction since he was 22years old  When asked what made him come in this time he states "I was feeling down, and I have no one here except my sister and she has her own life to live  I was also having increased in bad thoughts about wanting to hurt myself and I was in a bad mood  I tried to commit suicide a week ago by taking more drugs than I normally do, and someone found me later after I did this"  He admits to recent stressors including being homeless, lack of support, no income and problems with his drug use  When asked about his mood he states "I don't know"  He does endorse feelings of being hopeless, helpless, worthless and guilty  He states in the last two weeks he has had decreased sleep, energy, appetite, ability to concentrate and memory  He is currently denying suicidal ideation and homicidal ideation   He also denies visual hallucinations, however he does endorse auditory hallucinations when using drugs that say things like "I am not worth it, I am not useful"  This has happened to him several times  ROS: When asked about manic symptoms he does endorse having a week long period of time of having high energy and feeling like he is on top of the world in the absence of drugs  He stated his mind was racing and people did tell him that he was talking faster than normal  He does admit to history of trauma while he was in long term but became increasingly tearful when talking about this and was told this could be discussed at a later time  During the interview we were not able to talk about any history of anxiety or panic attacks in the past      FH: Patient states his mother suffered from depression and when he was a child told him "I want to throw myself off the balcony"  He denies anyone else in his family having psychiatric illness  He did state his sister had issues with substances in the past but no longer does  SH: He has been homeless for 2 years, he does not have a source of income  He completed 11th grade  He mentions being in residential in the past but was unable to tell the timeline and what he was in residential for as he became tearful talking about this time  He admits to having limited support, as he feels he only has his sister and no one else  Substance: He admits to using cocaine about 5-6 bags a day, and 9-10 bags of heroin a day  He also smokes around half a pack of cigarettes a day  He has smoked cigarettes for 7 years  He denies alcohol use  He admits to being in rehab at "[facility]" and was there for around 3 days in the past      Stressors: Homeless, No income, Substance use     Medical Review Of Systems:  Pertinent items are noted in HPI      Psychiatric ROS and PMHx     Psychiatric Review Of Systems:  sleep: yes, decreased  appetite changes: yes, decreased  weight changes: no  energy/anergy: yes, decreased   interest/pleasure/anhedonia: yes, decreased  somatic symptoms: no  anxiety/panic: yes, suffers from anxiety regarding the stress of being homeless, no income and drug addiction  rosibel: yes, endorses period of elevated mood and decreased sleep for one week period of time in the absence of drugs  He was witnessed to being talking fast and said his mind felt like it was racing during this time period   guilty/hopeless: yes  self injurious behavior/risky behavior: yes, patient showed the interviewer the scars of him cutting his wrist in the past     Historical Information     Past Psychiatric History:   Past Inpatient Psychiatric management: Patient admits to being in an inpatient hospital 1-2 times in his life  Past Outpatient Psychiatric management: He admits to seeing an outpatient psychiatrist and therapist but is not currently seeing one now  Past Medication trials: Seroquel  Past Suicide attempts: Yes, patient states last week he tried to overdose, and he has also cut his wrist as he showed the interviewer the scars on his arm   History of non-suicidal self injury: cutting his wrist   Past Violent behavior: Unable to obtain other than being in correction in the past     Substance Abuse History:    Social History     Tobacco History     Smoking Status  Current Every Day Smoker Smoking Frequency  0 5 packs/day    Smokeless Tobacco Use  Never Used          Alcohol History     Alcohol Use Status  Not Currently          Drug Use     Drug Use Status  Yes Types  Cocaine, Fentanyl, Heroin, Marijuana          Sexual Activity     Sexually Active  Not Asked          Activities of Daily Living    Not Asked                 I have assessed this patient for substance use within the past 12 months     Family Psychiatric History:   Sister has issues with drugs, but is now clean  He states his mother suffered from depression and made suicidal claims of wanting to throw herself of the balcony when he was a child       Social History:  Education: 9th grade  Learning Disabilities: None   Marital history: single  Living arrangement, social support: Homeless for the last 2 years  Access to firearms: No  Occupational History: unemployed  Functioning Relationships: alone & isolated, fearful & suspicious of most people and poor support system  Other Pertinent History: Legal: past incarcerations       Traumatic History:   Abuse: Did not get into details during the interview  Other Traumatic Events: Did not get into details during the interview, but does endorse trauma from being in prison    History reviewed  No pertinent past medical history  Meds/Allergies   all current active meds have been reviewed, current meds:   No current facility-administered medications for this encounter  and PTA meds:   Prior to Admission Medications   Prescriptions Last Dose Informant Patient Reported?  Taking?   calcium carbonate (TUMS) 500 mg chewable tablet Not Taking at Unknown time  No No   Sig: Chew 1 tablet (500 mg total) 3 (three) times a day as needed for indigestion or heartburn   Patient not taking: No sig reported   cyclobenzaprine (FLEXERIL) 10 mg tablet Not Taking at Unknown time  No No   Sig: Take 1 tablet (10 mg total) by mouth 2 (two) times a day as needed for muscle spasms   Patient not taking: No sig reported   naproxen (NAPROSYN) 500 mg tablet Not Taking at Unknown time  No No   Sig: Take 1 tablet (500 mg total) by mouth 2 (two) times a day with meals   Patient not taking: No sig reported   ondansetron (ZOFRAN-ODT) 4 mg disintegrating tablet Not Taking at Unknown time  No No   Sig: Take 1 tablet (4 mg total) by mouth every 6 (six) hours as needed for nausea or vomiting   Patient not taking: No sig reported   pantoprazole (PROTONIX) 40 mg tablet Not Taking at Unknown time  No No   Sig: Take 1 tablet (40 mg total) by mouth daily   Patient not taking: No sig reported      Facility-Administered Medications: None     No Known Allergies    Objective   Vital signs in last 24 hours:  Temp:  [96 1 °F (35 6 °C)-98 2 °F (36 8 °C)] 97 5 °F (36 4 °C)  HR:  [] 95  Resp:  [16-18] 18  BP: (116-142)/(49-89) 132/78    No intake or output data in the 24 hours ending 09/30/22 1152    Mental Status Evaluation and Medical ROS     Mental Status Evaluation:  Appearance:  alert, poor  eye contact, appears older than stated age, casually dressed, disheveled, poor dentition, tattooed and bearded,  male    Behavior:  calm and guarded at times, observed to be restless and fidget during the interview   Motor: restless and fidgety   Speech:  spontaneous, delayed initiation, normal volume and decreased rate   Mood:  "I don't know"   Affect:  constricted, depressed, dysphoric and tearful at times   Thought Process:  perseverative, circumstantial   Thought Content: paranoid ideation, about people wanting to get him as he feels all alone   Perceptual disturbances: auditory hallucinations when using drugs that says "I am not worth it, I am not useful" and does not appear to be responding to internal stimuli at this time   Risk Potential: No active or passive suicidal or homicidal ideation was verbalized during interview, Potential for aggression as he demonstrates poor impulse control    Cognition: oriented to self and situation, appears to be of average intelligence and cognition not formally tested   Insight:  Poor   Judgment: Poor     Muscle Strength and Tone: Within normal limits   Gait/Station: normal gait/station   Motor Activity: no abnormal movements       Laboratory results:    I have personally reviewed all pertinent laboratory/tests results    Most Recent Labs:   Lab Results   Component Value Date    WBC 8 40 08/13/2021    RBC 4 30 (L) 08/13/2021    HGB 14 6 08/13/2021    HCT 42 0 08/13/2021    PLT 92 (L) 08/13/2021    RDW 13 5 08/13/2021    NEUTROABS 4 20 08/13/2021    SODIUM 138 08/13/2021    K 3 9 08/13/2021     08/13/2021    CO2 30 08/13/2021    BUN 9 08/13/2021    CREATININE 0 78 08/13/2021    GLUC 80 08/13/2021    CALCIUM 8 4 08/13/2021    AST 48 08/13/2021    ALT 62 (H) 08/13/2021    ALKPHOS 45 08/13/2021    TP 6 5 08/13/2021    ALB 3 5 08/13/2021    TBILI 2 11 (H) 08/13/2021       Risk of Harm to Self:    The following ratings are based on assessment at the time of the interview   Demographic risk factors include: lowest socioeconomic class, never , male   Historical Risk Factors include: chronic psychiatric problems, chronic depression, chronic depressive symptoms, history of depression, chronic anxiety symptoms, history of anxiety, history of psychosis, history of suicidal behaviors, history of suicide attempts, drug use, substance use, history of substance use, victim of abuse, history of impulsive behaviors, history of legal problems   Current Specific Risk Factors include: recent suicide attempt, recent suicidal ideation, has chronic passive death wish, has fleeting suicidal thoughts, chronic paranoid ideation, poor impulse control, lack of support, substance use, social isolation, ongoing depressive symptoms   Protective Factors: no current suicidal ideation, resiliency, ability to contract for safety with staff, ability to communicate with staff   Weapons/Firearms: none  The following steps have been taken to ensure weapons are properly secured: not applicable   Based on today's assessment, Raciel Spangler presents the following risk of harm to self: moderate    Risk of Harm to Others:   The following ratings are based on assessment at the time of the interview   Demographic Risk Factors include: male, unemployed, 14-21 years of age   Historical Risk Factors include: drug abuse     Current Specific Risk Factors include: recent difficulty with impulse control, recent episode of mood instability, recent substance use, behavior suggesting impulsivity, multiple stressors, social difficulties, undergoing substance withdrawal   Protective Factors: no current homicidal ideation, compliant with medications, compliant with treatment, resilience   Weapons/Firearms: none  The following steps have been taken to ensure weapons are properly secured: not applicable   Based on today's assessment, Trae Guerra presents the following risk of harm to others: minimal    This note has been constructed in part using a voice recognition system  There may be translation, syntax,  or grammatical errors  If you have any questions, please contact the dictating provider      Ijeoma Tim  Psychiatry Resident, PGY-1

## 2022-09-30 NOTE — ED NOTES
Pt requests to leave, stating "I talked to my sister and I can stay with her, I feel better like I don't need help here anymore "     Susan Us, RN  09/30/22 3406

## 2022-09-30 NOTE — ED NOTES
Patient requesting medicine for anxiety  RN spoke with Dr Casey Fonseca, meds will be ordered  1:1 remains at the bedside       Christopher Koroma RN  09/30/22 6756

## 2022-09-30 NOTE — ED PROVIDER NOTES
History  Chief Complaint   Patient presents with   • Psychiatric Evaluation     Pt returned to ER requesting detox  56-year-old male past medical history of substance abuse voluntary returned to ED to continue his detox and psychiatric evaluations  See previous note  At this time he was mentioning suicidal thoughts from earlier today, but states he is only feeling depressed  Has no plan to harm himself at this time  Denies heroin or cocaine use in the time since he eloped prior to returning  He denies new or worsening symptoms  History provided by:  Patient and medical records  Psychiatric Evaluation      Prior to Admission Medications   Prescriptions Last Dose Informant Patient Reported? Taking?   calcium carbonate (TUMS) 500 mg chewable tablet   No No   Sig: Chew 1 tablet (500 mg total) 3 (three) times a day as needed for indigestion or heartburn   Patient not taking: Reported on 10/12/2021   cyclobenzaprine (FLEXERIL) 10 mg tablet   No No   Sig: Take 1 tablet (10 mg total) by mouth 2 (two) times a day as needed for muscle spasms   Patient not taking: Reported on 10/12/2021   naproxen (NAPROSYN) 500 mg tablet   No No   Sig: Take 1 tablet (500 mg total) by mouth 2 (two) times a day with meals   Patient not taking: Reported on 10/12/2021   ondansetron (ZOFRAN-ODT) 4 mg disintegrating tablet   No No   Sig: Take 1 tablet (4 mg total) by mouth every 6 (six) hours as needed for nausea or vomiting   Patient not taking: Reported on 10/12/2021   pantoprazole (PROTONIX) 40 mg tablet   No No   Sig: Take 1 tablet (40 mg total) by mouth daily   Patient not taking: Reported on 10/12/2021      Facility-Administered Medications: None       History reviewed  No pertinent past medical history  History reviewed  No pertinent surgical history  History reviewed  No pertinent family history  I have reviewed and agree with the history as documented      E-Cigarette/Vaping   • E-Cigarette Use Never User E-Cigarette/Vaping Substances   • Nicotine No    • THC No    • CBD No    • Flavoring No    • Other No    • Unknown No      Social History     Tobacco Use   • Smoking status: Current Every Day Smoker     Packs/day: 0 50   • Smokeless tobacco: Never Used   Vaping Use   • Vaping Use: Never used   Substance Use Topics   • Alcohol use: Not Currently   • Drug use: Yes     Types: Marijuana, Cocaine, Heroin, Fentanyl       Review of Systems  Review of Systems   Constitutional: Negative for chills and fever  HENT: Negative for congestion, rhinorrhea and sore throat  Eyes: Negative for visual disturbance  Respiratory: Negative for cough and shortness of breath  Cardiovascular: Negative for chest pain, palpitations and leg swelling  Gastrointestinal: Negative for abdominal pain, bowel incontinence, nausea and vomiting  Genitourinary: Positive for dysuria  Negative for bladder incontinence, decreased urine volume, hematuria, penile pain and testicular pain  Musculoskeletal: Positive for myalgias  Negative for neck stiffness  Skin: Negative for rash and wound  Neurological: Negative for dizziness, seizures, loss of consciousness, syncope, weakness and headaches  Psychiatric/Behavioral: Positive for agitation, confusion, paranoia and suicidal ideas  Negative for hallucinations and homicidal ideas  The patient is nervous/anxious  All other systems reviewed and are negative      Physical Exam  Physical Exam  Vitals and nursing note reviewed  Constitutional:       General: He is awake  He is not in acute distress  Appearance: Normal appearance  He is not ill-appearing, toxic-appearing or diaphoretic  HENT:      Head: Normocephalic and atraumatic  Jaw: No swelling  Right Ear: External ear normal       Left Ear: External ear normal       Mouth/Throat:      Lips: Pink  Mouth: Mucous membranes are moist    Eyes:      General: Lids are normal  Vision grossly intact           Right eye: No discharge  Left eye: No discharge  Conjunctiva/sclera: Conjunctivae normal    Cardiovascular:      Rate and Rhythm: Normal rate and regular rhythm  Heart sounds: Normal heart sounds  Pulmonary:      Effort: Pulmonary effort is normal  No respiratory distress  Breath sounds: Normal breath sounds  Abdominal:      General: There is no distension  Palpations: Abdomen is soft  Tenderness: There is no abdominal tenderness  Musculoskeletal:         General: No swelling  Cervical back: Neck supple  Skin:     General: Skin is warm and dry  Coloration: Skin is not jaundiced or pale  Comments: Scars of forearms  No fresh wounds  Neurological:      Mental Status: He is alert and oriented to person, place, and time  GCS: GCS eye subscore is 4  GCS verbal subscore is 5  GCS motor subscore is 6  Motor: No tremor  Gait: Gait normal    Psychiatric:         Mood and Affect: Mood is anxious  Behavior: Behavior normal  Behavior is cooperative  Thought Content: Thought content includes suicidal ideation  Thought content does not include homicidal ideation  Thought content does not include homicidal or suicidal plan           Vital Signs  ED Triage Vitals [09/29/22 1614]   Temperature Pulse Respirations Blood Pressure SpO2   98 1 °F (36 7 °C) 75 18 142/89 99 %      Temp Source Heart Rate Source Patient Position - Orthostatic VS BP Location FiO2 (%)   Oral Monitor Lying Right arm --      Pain Score       --           Vitals:    09/29/22 1614   BP: 142/89   Pulse: 75   Patient Position - Orthostatic VS: Lying         Visual Acuity      ED Medications  Medications - No data to display    Diagnostic Studies  Results Reviewed     None                 No orders to display              Procedures  Procedures         ED Course  ED Course as of 09/29/22 2307   u Sep 29, 2022   1629 Per crisis, patient is suitable for Q7     1705 Patient requesting to leave  Discussed with crisis, no grounds for 302, patient is free to leave  56 Will get AMA paperwork                               SBIRT 22yo+    Flowsheet Row Most Recent Value   SBIRT (23 yo +)    In order to provide better care to our patients, we are screening all of our patients for alcohol and drug use  Would it be okay to ask you these screening questions? No Filed at: 09/29/2022 1616                    MDM  Number of Diagnoses or Management Options  Encounter for psychological evaluation  Substance abuse [facility])  Diagnosis management comments: Patient re-presented after eloping earlier today  Vital signs are stable  Patient made one-to-one pending crisis evaluation  Evaluated by crisis who does not believe he is high risk at this time  Patient moved to [facility] and is willing to sign a 201  Prior to labs being performed patient requested to leave AMA because he wanted to see his sister  Again discussed with crisis, there are no grounds for 302 at this time, cannot hold patient  AMA paperwork signed, discussed with patient via , patient voiced understanding  Discussed plan with patient for if he starts having suicidal ideation again and/or has a plan, this includes returning immediately to the emergency room, calling 911, calling [phone], patient again voiced understanding and repeated back plan  Yuriy Reynolds insists on leaving against medical advice, despite my recommendation to remain for ongoing treatment  1: Capacity: I have determined that the patient has capacity to make the decision to leave against medical advice based on the following:   A  Ability to express a choice: The patient is able to express his or her choice and communicate that choice     Lesa Distad to understand relevant information: The patient is able to verbalize their diagnosis, understand information about the purpose of treatment, remember the information, and show that he or she can be part of the decision-making process  Gilmar Gil to appreciate the significance of the information and its consequences: The patient understands the consequences of treatment refusal and the risks and benefits of accepting or refusing treatment  D  Ability to manipulate information: The patient is able to engage in reasoning as it applies to making treatment decisions   2: Psychiatric Consultation: There is not an indication to call psychiatry consultation to determine capacity   3  Alternative Treatment: I have discussed the recommended course of treatment and available alternatives  4  Risks: I have discussed the specific risks of that patient refusing treatment    5  Follow-up Care: I have discussed the follow-up care and advised to see PCP immediately   6  ED Option: I have emphasized that the patient has the option to return to the ED        Disposition  Final diagnoses:   Substance abuse ([facility] )   Encounter for psychological evaluation     Time reflects when diagnosis was documented in both MDM as applicable and the Disposition within this note     Time User Action Codes Description Comment    9/29/2022  5:06 PM Philly Mejia Add [F19 10] Substance abuse ([facility] [address] )     9/29/2022  5:06 PM Philly Cifuentes [Z00 8] Encounter for psychological evaluation       ED Disposition     ED Disposition   AMA    Condition   --    Date/Time   Thu Sep 29, 2022  5:12 PM    Comment   Date: 9/29/2022  Patient: Alessandro Aiken  Admitted: 9/29/2022  4:13 PM  Attending Provider: John Alcocer MD    Alessandro Ferrarahanh or his authorized caregiver has made the decision for the patient to leave the emergency dep artment against the advice of the emergency department staff  He or his authorized caregiver has been informed and understands the inherent risks, including death, morbidity, worsening pain, worsening symptoms, organ dysfunction, organ failure    He o r his authorized caregiver has decided to accept the responsibility for this decision  Max Pascal and all necessary parties have been advised that he may return for further evaluation or treatment  His condition at time of discharge  was stable  Max Pascal had current vital signs as follows:  /89 (BP Location: Right arm)   Pulse 75   Temp 98 1 °F (36 7 °C) (Oral)   Resp 18   Wt 64 1 kg (141 lb 5 oz)            Follow-up Information     Follow up With Specialties Details Why Contact Info Additional [address] [facility] Emergency Department Emergency Medicine  for SI [address] [id number] [address] [facility] Emergency Department          Discharge Medication List as of 9/29/2022  5:21 PM      CONTINUE these medications which have NOT CHANGED    Details   calcium carbonate (TUMS) 500 mg chewable tablet Chew 1 tablet (500 mg total) 3 (three) times a day as needed for indigestion or heartburn, Starting Fri 8/13/2021, Normal      cyclobenzaprine (FLEXERIL) 10 mg tablet Take 1 tablet (10 mg total) by mouth 2 (two) times a day as needed for muscle spasms, Starting Sun 10/3/2021, Normal      naproxen (NAPROSYN) 500 mg tablet Take 1 tablet (500 mg total) by mouth 2 (two) times a day with meals, Starting Sun 10/3/2021, Normal      ondansetron (ZOFRAN-ODT) 4 mg disintegrating tablet Take 1 tablet (4 mg total) by mouth every 6 (six) hours as needed for nausea or vomiting, Starting Fri 8/13/2021, Normal      pantoprazole (PROTONIX) 40 mg tablet Take 1 tablet (40 mg total) by mouth daily, Starting Fri 8/13/2021, Normal             No discharge procedures on file      PDMP Review     None          ED Provider  Electronically Signed by           Cherelle Stephen PA-C  09/29/22 1089

## 2022-09-30 NOTE — ED NOTES
This RN assumes care of pt, no distress noted, room check done for pt safety       Arlin Clay, STEVEN  09/30/22 3931

## 2022-09-30 NOTE — ED NOTES
Patient is accepted at [facility]  Patient is accepted by Dr Ale Hanna per [facility] is arranged with TBD  Transportation is scheduled for TBD     Patient may go to the floor at after 6pm    Slets contacted

## 2022-09-30 NOTE — ED NOTES
Patient provided with a wireless phone to speak with his sister  1:1 remains at the bedside       Jigar Parham RN  09/30/22 0351

## 2022-09-30 NOTE — ED NOTES
Pt became tearful and apprehensive when the option of a 201 or necessity of a 302 was explained  Pt admitted to a hx of trauma as being a reason why he was so fearful of restraints and the conversation of a 302, but was unable to discuss further  After  conversation and reassurance that restraints would not be neccessary with cooperation and the decision to seek help voluntarily, pt was agreeable to signing a 201  [facility] utilized a video , via, Yee Ribeiro to ensure pt understood his rights and felt comfortable signing  After signing, pt apologized to [facility] and security for creating problems and stated he was appreciative of the patience and kindness  Pt stated he was a loving person and would never want to hurt anyone  Chief Complaint   Patient presents with    Psychiatric Evaluation       Pt returned to ER requesting detox        52-year-old male past medical history of substance abuse voluntary returned to ED to continue his detox and psychiatric evaluations  See previous note  At this time he was mentioning suicidal thoughts from earlier today, but states he is only feeling depressed  Has no plan to harm himself at this time  Denies heroin or cocaine use in the time since he eloped prior to returning    He denies new or worsening symptoms

## 2022-10-01 VITALS
WEIGHT: 141 LBS | OXYGEN SATURATION: 100 % | RESPIRATION RATE: 16 BRPM | HEART RATE: 75 BPM | DIASTOLIC BLOOD PRESSURE: 82 MMHG | BODY MASS INDEX: 23.46 KG/M2 | TEMPERATURE: 97.5 F | SYSTOLIC BLOOD PRESSURE: 120 MMHG

## 2022-10-01 NOTE — ED NOTES
Patient found in the hallway with 1:1 observation attendant behind him, asking to speak with RN  RN asked patient to please have a seat back on his stretcher and we will be by to speak with him  RN called crisis worker [facility] over to speak with patient, security on standby  Patient stating "I don't want to go to [place], I have problems there on the street  I don't have transport back  Last time I was sent to another facility,I spent two months on the street"  This RN went into great detail that it is very difficult to find a mental health placement and that he will have transport to get back to [facility]  Patient agreeable on going to [facility] [address]        Geoffrey Downs RN  10/01/22 0498

## 2022-10-01 NOTE — EMTALA/ACUTE CARE TRANSFER
[facility] EMERGENCY DEPARTMENT  [address] [facility] [address]  [phone]  Dept: [phone]      EMTALA TRANSFER CONSENT    NAME Jose Francisco Narvaez                                         1995                              MRN [id number]    I have been informed of my rights regarding examination, treatment, and transfer   by Dr Lilian Sherman, *    Benefits:  Psych Care    Risks:  Delay in care      Transfer Request   I acknowledge that my medical condition has been evaluated and explained to me by the emergency department physician or other qualified medical person and/or my attending physician who has recommended and offered to me further medical examination and treatment  I understand the Hospital's obligation with respect to the treatment and stabilization of my emergency medical condition  I nevertheless request to be transferred  I release the Hospital, the doctor, and any other persons caring for me from all responsibility or liability for any injury or ill effects that may result from my transfer and agree to accept all responsibility for the consequences of my choice to transfer, rather than receive stabilizing treatment at the Hospital  I understand that because the transfer is my request, my insurance may not provide reimbursement for the services  The Hospital will assist and direct me and my family in how to make arrangements for transfer, but the hospital is not liable for any fees charged by the transport service  In spite of this understanding, I refuse to consent to further medical examination and treatment which has been offered to me, and request transfer to    I authorize the performance of emergency medical procedures and treatments upon me in both transit and upon arrival at the receiving facility    Additionally, I authorize the release of any and all medical records to the receiving facility and request they be transported with me, if possible  I authorize the performance of emergency medical procedures and treatments upon me in both transit and upon arrival at the receiving facility  Additionally, I authorize the release of any and all medical records to the receiving facility and request they be transported with me, if possible  I understand that the safest mode of transportation during a medical emergency is an ambulance and that the Hospital advocates the use of this mode of transport  Risks of traveling to the receiving facility by car, including absence of medical control, life sustaining equipment, such as oxygen, and medical personnel has been explained to me and I fully understand them  (BRAEDEN CORRECT BOX BELOW)  [  ]  I consent to the stated transfer and to be transported by ambulance/helicopter  [  ]  I consent to the stated transfer, but refuse transportation by ambulance and accept full responsibility for my transportation by car  I understand the risks of non-ambulance transfers and I exonerate the Hospital and its staff from any deterioration in my condition that results from this refusal     X___________________________________________    DATE  10/01/22  TIME________  Signature of patient or legally responsible individual signing on patient behalf           RELATIONSHIP TO PATIENT_________________________          Provider Certification    NAME Rhys Kraft                                         1995                              MRN [id number]    A medical screening exam was performed on the above named patient  Based on the examination:    Condition Necessitating Transfer The primary encounter diagnosis was Suicidal ideation  Diagnoses of Thoughts of self harm, Heroin abuse ([facility] [address] ), and Cocaine abuse ([facility] [address] ) were also pertinent to this visit      Patient Condition:      Reason for Transfer:      Transfer Requirements: Facility     · Space available and qualified personnel available for treatment as acknowledged by    · Agreed to accept transfer and to provide appropriate medical treatment as acknowledged by          · Appropriate medical records of the examination and treatment of the patient are provided at the time of transfer   [address] [facility], [address] _______  · Transfer will be performed by qualified personnel from    and appropriate transfer equipment as required, including the use of necessary and appropriate life support measures  Provider Certification: I have examined the patient and explained the following risks and benefits of being transferred/refusing transfer to the patient/family:         Based on these reasonable risks and benefits to the patient and/or the unborn child(ramiro), and based upon the information available at the time of the patients examination, I certify that the medical benefits reasonably to be expected from the provision of appropriate medical treatments at another medical facility outweigh the increasing risks, if any, to the individuals medical condition, and in the case of labor to the unborn child, from effecting the transfer      X____________________________________________ DATE 10/01/22        TIME_______      ORIGINAL - SEND TO MEDICAL RECORDS   COPY - SEND WITH PATIENT DURING TRANSFER

## 2022-10-01 NOTE — ED NOTES
Patient provided with a wireless phone to call his sister, 1:1 remains at the bedside       Delmon Callas, RN  10/01/22 6375

## 2022-11-07 ENCOUNTER — APPOINTMENT (OUTPATIENT)
Dept: CT IMAGING | Facility: HOSPITAL | Age: 27
End: 2022-11-07

## 2022-11-07 ENCOUNTER — HOSPITAL ENCOUNTER (OUTPATIENT)
Facility: HOSPITAL | Age: 27
Setting detail: OBSERVATION
Discharge: HOME | End: 2022-11-08
Attending: EMERGENCY MEDICINE | Admitting: EMERGENCY MEDICINE

## 2022-11-07 DIAGNOSIS — F11.93 OPIOID WITHDRAWAL (HCC): Primary | ICD-10-CM

## 2022-11-07 DIAGNOSIS — F11.10 HEROIN ABUSE (HCC): ICD-10-CM

## 2022-11-07 DIAGNOSIS — F19.10 POLYSUBSTANCE ABUSE (HCC): ICD-10-CM

## 2022-11-07 DIAGNOSIS — F11.20 OPIOID USE DISORDER, SEVERE, DEPENDENCE (HCC): ICD-10-CM

## 2022-11-07 PROBLEM — D72.829 LEUKOCYTOSIS: Status: ACTIVE | Noted: 2022-11-07

## 2022-11-07 LAB
ALBUMIN SERPL BCP-MCNC: 4.7 G/DL (ref 3.5–5)
ALBUMIN SERPL BCP-MCNC: 4.9 G/DL (ref 3.5–5)
ALP SERPL-CCNC: 74 U/L (ref 43–122)
ALP SERPL-CCNC: 81 U/L (ref 43–122)
ALT SERPL W P-5'-P-CCNC: 71 U/L
ALT SERPL W P-5'-P-CCNC: 84 U/L
ANION GAP SERPL CALCULATED.3IONS-SCNC: 8 MMOL/L (ref 5–14)
ANION GAP SERPL CALCULATED.3IONS-SCNC: 9 MMOL/L (ref 5–14)
APAP SERPL-MCNC: <10 UG/ML (ref 10–20)
AST SERPL W P-5'-P-CCNC: 78 U/L (ref 17–59)
AST SERPL W P-5'-P-CCNC: 89 U/L (ref 17–59)
ATRIAL RATE: 71 BPM
BASOPHILS # BLD AUTO: 0.03 THOUSANDS/ÂΜL (ref 0–0.1)
BASOPHILS NFR BLD AUTO: 0 % (ref 0–1)
BILIRUB SERPL-MCNC: 0.93 MG/DL (ref 0.2–1)
BILIRUB SERPL-MCNC: 1.36 MG/DL (ref 0.2–1)
BUN SERPL-MCNC: 12 MG/DL (ref 5–25)
BUN SERPL-MCNC: 13 MG/DL (ref 5–25)
CALCIUM SERPL-MCNC: 9.4 MG/DL (ref 8.4–10.2)
CALCIUM SERPL-MCNC: 9.9 MG/DL (ref 8.4–10.2)
CHLORIDE SERPL-SCNC: 102 MMOL/L (ref 96–108)
CHLORIDE SERPL-SCNC: 99 MMOL/L (ref 96–108)
CO2 SERPL-SCNC: 27 MMOL/L (ref 21–32)
CO2 SERPL-SCNC: 33 MMOL/L (ref 21–32)
CREAT SERPL-MCNC: 0.7 MG/DL (ref 0.7–1.5)
CREAT SERPL-MCNC: 0.75 MG/DL (ref 0.7–1.5)
EOSINOPHIL # BLD AUTO: 0.02 THOUSAND/ÂΜL (ref 0–0.61)
EOSINOPHIL NFR BLD AUTO: 0 % (ref 0–6)
ERYTHROCYTE [DISTWIDTH] IN BLOOD BY AUTOMATED COUNT: 12.8 % (ref 11.6–15.1)
ETHANOL SERPL-MCNC: <10 MG/DL (ref 0–10)
GFR SERPL CREATININE-BSD FRML MDRD: 126 ML/MIN/1.73SQ M
GFR SERPL CREATININE-BSD FRML MDRD: 130 ML/MIN/1.73SQ M
GLUCOSE SERPL-MCNC: 79 MG/DL (ref 70–99)
GLUCOSE SERPL-MCNC: 87 MG/DL (ref 70–99)
HCT VFR BLD AUTO: 45.8 % (ref 36.5–49.3)
HGB BLD-MCNC: 14.9 G/DL (ref 12–17)
IMM GRANULOCYTES # BLD AUTO: 0.04 THOUSAND/UL (ref 0–0.2)
IMM GRANULOCYTES NFR BLD AUTO: 0 % (ref 0–2)
LYMPHOCYTES # BLD AUTO: 1.71 THOUSANDS/ÂΜL (ref 0.6–4.47)
LYMPHOCYTES NFR BLD AUTO: 15 % (ref 14–44)
MAGNESIUM SERPL-MCNC: 1.8 MG/DL (ref 1.6–2.3)
MCH RBC QN AUTO: 30.9 PG (ref 26.8–34.3)
MCHC RBC AUTO-ENTMCNC: 32.5 G/DL (ref 31.4–37.4)
MCV RBC AUTO: 95 FL (ref 82–98)
MONOCYTES # BLD AUTO: 0.43 THOUSAND/ÂΜL (ref 0.17–1.22)
MONOCYTES NFR BLD AUTO: 4 % (ref 4–12)
NEUTROPHILS # BLD AUTO: 8.85 THOUSANDS/ÂΜL (ref 1.85–7.62)
NEUTS SEG NFR BLD AUTO: 81 % (ref 43–75)
NRBC BLD AUTO-RTO: 0 /100 WBCS
P AXIS: 76 DEGREES
PLATELET # BLD AUTO: 175 THOUSANDS/UL (ref 149–390)
PMV BLD AUTO: 11.1 FL (ref 8.9–12.7)
POTASSIUM SERPL-SCNC: 4.4 MMOL/L (ref 3.5–5.3)
POTASSIUM SERPL-SCNC: 5.8 MMOL/L (ref 3.5–5.3)
PR INTERVAL: 154 MS
PROT SERPL-MCNC: 8.9 G/DL (ref 6.4–8.4)
PROT SERPL-MCNC: 9.9 G/DL (ref 6.4–8.4)
QRS AXIS: 51 DEGREES
QRSD INTERVAL: 76 MS
QT INTERVAL: 364 MS
QTC INTERVAL: 395 MS
RBC # BLD AUTO: 4.82 MILLION/UL (ref 3.88–5.62)
SALICYLATES SERPL-MCNC: <1 MG/DL (ref 10–30)
SODIUM SERPL-SCNC: 137 MMOL/L (ref 135–147)
SODIUM SERPL-SCNC: 141 MMOL/L (ref 135–147)
T WAVE AXIS: 34 DEGREES
VENTRICULAR RATE: 71 BPM
WBC # BLD AUTO: 11.08 THOUSAND/UL (ref 4.31–10.16)

## 2022-11-07 RX ORDER — GABAPENTIN 300 MG/1
300 CAPSULE ORAL EVERY 8 HOURS PRN
Status: DISCONTINUED | OUTPATIENT
Start: 2022-11-07 | End: 2022-11-08 | Stop reason: HOSPADM

## 2022-11-07 RX ORDER — ACETAMINOPHEN 325 MG/1
650 TABLET ORAL EVERY 6 HOURS PRN
Status: DISCONTINUED | OUTPATIENT
Start: 2022-11-07 | End: 2022-11-08 | Stop reason: HOSPADM

## 2022-11-07 RX ORDER — TRAZODONE HYDROCHLORIDE 50 MG/1
50 TABLET ORAL
Status: DISCONTINUED | OUTPATIENT
Start: 2022-11-07 | End: 2022-11-08 | Stop reason: HOSPADM

## 2022-11-07 RX ORDER — ONDANSETRON 2 MG/ML
4 INJECTION INTRAMUSCULAR; INTRAVENOUS EVERY 6 HOURS PRN
Status: DISCONTINUED | OUTPATIENT
Start: 2022-11-07 | End: 2022-11-08 | Stop reason: HOSPADM

## 2022-11-07 RX ORDER — CLONIDINE HYDROCHLORIDE 0.1 MG/1
0.1 TABLET ORAL EVERY 6 HOURS PRN
Status: DISCONTINUED | OUTPATIENT
Start: 2022-11-07 | End: 2022-11-08 | Stop reason: HOSPADM

## 2022-11-07 RX ORDER — CLONIDINE HYDROCHLORIDE 0.1 MG/1
0.2 TABLET ORAL EVERY 6 HOURS PRN
Status: DISCONTINUED | OUTPATIENT
Start: 2022-11-07 | End: 2022-11-08 | Stop reason: HOSPADM

## 2022-11-07 RX ORDER — BUPRENORPHINE 20 UG/H
20 PATCH TRANSDERMAL
Status: DISCONTINUED | OUTPATIENT
Start: 2022-11-07 | End: 2022-11-08 | Stop reason: HOSPADM

## 2022-11-07 RX ORDER — ONDANSETRON 2 MG/ML
4 INJECTION INTRAMUSCULAR; INTRAVENOUS EVERY 4 HOURS PRN
Status: DISCONTINUED | OUTPATIENT
Start: 2022-11-07 | End: 2022-11-07 | Stop reason: SDUPTHER

## 2022-11-07 RX ORDER — CLONAZEPAM 1 MG/1
2 TABLET ORAL EVERY 6 HOURS PRN
Status: DISCONTINUED | OUTPATIENT
Start: 2022-11-07 | End: 2022-11-08 | Stop reason: HOSPADM

## 2022-11-07 RX ORDER — NICOTINE 21 MG/24HR
1 PATCH, TRANSDERMAL 24 HOURS TRANSDERMAL DAILY
Status: DISCONTINUED | OUTPATIENT
Start: 2022-11-08 | End: 2022-11-08 | Stop reason: HOSPADM

## 2022-11-07 RX ORDER — QUETIAPINE FUMARATE 50 MG/1
50 TABLET, FILM COATED ORAL
COMMUNITY
End: 2022-11-08

## 2022-11-07 RX ORDER — ENOXAPARIN SODIUM 100 MG/ML
40 INJECTION SUBCUTANEOUS DAILY
Status: DISCONTINUED | OUTPATIENT
Start: 2022-11-08 | End: 2022-11-08 | Stop reason: HOSPADM

## 2022-11-07 RX ADMIN — CLONAZEPAM 2 MG: 1 TABLET ORAL at 21:19

## 2022-11-07 RX ADMIN — BUPRENORPHINE 20 MCG: 20 PATCH, EXTENDED RELEASE TRANSDERMAL at 20:15

## 2022-11-07 RX ADMIN — SODIUM CHLORIDE 1000 ML: 0.9 INJECTION, SOLUTION INTRAVENOUS at 17:22

## 2022-11-07 NOTE — ASSESSMENT & PLAN NOTE
Patient with a history of chronic fentanyl use  Uses 7 bags daily via intravenously  History of prior inpatient detox with prescription of  suboxone in the past  PDMP reviewed no recent prescriptions for buprenorphine/ naloxone  Management of opioid withdrawal under MAT protocol as above  Will Screen for hepatitis/HIV with h/o IVDU  Case management consulted for assistance with rehab resources - pt expresses interest in outpatient drug and alcohol after d/c

## 2022-11-07 NOTE — ASSESSMENT & PLAN NOTE
· Patient endorses daily use of fentanyl, cocaine and marijuana  · Last used cocaine two days ago- denies any chest pain or palpitations  · Encourage Cessation

## 2022-11-07 NOTE — ED PROVIDER NOTES
History  Chief Complaint   Patient presents with   • Detox Evaluation     States last use this morning  Patient is a 25-year-old male coming in for evaluation for detox  Patient states he uses approximately 6 bags per day, last use this morning  Patient also states that he uses cocaine, and marijuana  Patient states that he injects heroin  Patient states that he is not withdrawing at this time, but does have generalized body pain  History provided by:  Patient   used: No    Detox Evaluation  Severity:  Moderate  Suspected agents:  Cocaine, heroin, marijuana and opiates  Associated symptoms: no abdominal pain, no headaches, no nausea, no shortness of breath, no suicidal ideation and no vomiting        Prior to Admission Medications   Prescriptions Last Dose Informant Patient Reported? Taking? QUEtiapine (SEROquel) 50 mg tablet Past Week at Unknown time  Yes Yes   Sig: Take 50 mg by mouth daily at bedtime   calcium carbonate (TUMS) 500 mg chewable tablet   No No   Sig: Chew 1 tablet (500 mg total) 3 (three) times a day as needed for indigestion or heartburn   Patient not taking: No sig reported   cyclobenzaprine (FLEXERIL) 10 mg tablet   No No   Sig: Take 1 tablet (10 mg total) by mouth 2 (two) times a day as needed for muscle spasms   Patient not taking: No sig reported   naproxen (NAPROSYN) 500 mg tablet   No No   Sig: Take 1 tablet (500 mg total) by mouth 2 (two) times a day with meals   Patient not taking: No sig reported   ondansetron (ZOFRAN-ODT) 4 mg disintegrating tablet   No No   Sig: Take 1 tablet (4 mg total) by mouth every 6 (six) hours as needed for nausea or vomiting   Patient not taking: No sig reported   pantoprazole (PROTONIX) 40 mg tablet   No No   Sig: Take 1 tablet (40 mg total) by mouth daily   Patient not taking: No sig reported      Facility-Administered Medications: None       History reviewed  No pertinent past medical history  History reviewed   No pertinent surgical history  History reviewed  No pertinent family history  I have reviewed and agree with the history as documented  E-Cigarette/Vaping   • E-Cigarette Use Never User      E-Cigarette/Vaping Substances   • Nicotine No    • THC No    • CBD No    • Flavoring No    • Other No    • Unknown No      Social History     Tobacco Use   • Smoking status: Current Every Day Smoker     Packs/day: 0 50   • Smokeless tobacco: Never Used   Vaping Use   • Vaping Use: Never used   Substance Use Topics   • Alcohol use: Not Currently   • Drug use: Yes     Types: Marijuana, Cocaine, Heroin, Fentanyl       Review of Systems   Constitutional: Negative  HENT: Negative  Eyes: Negative  Respiratory: Negative  Negative for chest tightness and shortness of breath  Cardiovascular: Negative  Negative for chest pain  Gastrointestinal: Negative  Negative for abdominal pain, nausea and vomiting  Genitourinary: Negative  Musculoskeletal: Negative  Skin: Negative  Neurological: Negative  Negative for headaches  Psychiatric/Behavioral: Negative  Negative for suicidal ideas  Physical Exam  Physical Exam  Vitals reviewed  Constitutional:       Appearance: Normal appearance  He is normal weight  HENT:      Head: Normocephalic and atraumatic  Right Ear: External ear normal       Left Ear: External ear normal       Nose: Nose normal    Eyes:      Conjunctiva/sclera: Conjunctivae normal    Cardiovascular:      Rate and Rhythm: Normal rate  Pulmonary:      Effort: Pulmonary effort is normal    Abdominal:      Palpations: Abdomen is soft  Tenderness: There is no abdominal tenderness  There is no guarding  Musculoskeletal:         General: Normal range of motion  Cervical back: Normal range of motion  Skin:     General: Skin is warm and dry  Neurological:      Mental Status: He is alert           Vital Signs  ED Triage Vitals [11/07/22 1356]   Temperature Pulse Respirations Blood Pressure SpO2   98 2 °F (36 8 °C) (!) 130 18 143/85 96 %      Temp Source Heart Rate Source Patient Position - Orthostatic VS BP Location FiO2 (%)   Oral Monitor Sitting Left arm --      Pain Score       8           Vitals:    11/07/22 1356 11/07/22 1726   BP: 143/85 107/56   Pulse: (!) 130 69   Patient Position - Orthostatic VS: Sitting Lying         Visual Acuity      ED Medications  Medications   ondansetron (ZOFRAN) injection 4 mg (has no administration in time range)   cloNIDine (CATAPRES) tablet 0 2 mg (has no administration in time range)   sodium chloride 0 9 % bolus 1,000 mL (1,000 mL Intravenous New Bag 11/7/22 1722)       Diagnostic Studies  Results Reviewed     Procedure Component Value Units Date/Time    Comprehensive metabolic panel [[id number]]  (Abnormal) Collected: 11/07/22 1717    Lab Status: Final result Specimen: Blood from Arm, Right Updated: 11/07/22 1742     Sodium 141 mmol/L      Potassium 4 4 mmol/L      Chloride 99 mmol/L      CO2 33 mmol/L      ANION GAP 9 mmol/L      BUN 13 mg/dL      Creatinine 0 75 mg/dL      Glucose 79 mg/dL      Calcium 9 9 mg/dL      AST 78 U/L      ALT 84 U/L      Alkaline Phosphatase 81 U/L      Total Protein 9 9 g/dL      Albumin 4 9 g/dL      Total Bilirubin 0 93 mg/dL      eGFR 126 ml/min/1 73sq m     Narrative:      [facility] guidelines for Chronic Kidney Disease (CKD):   •  Stage 1 with normal or high GFR (GFR > 90 mL/min/1 73 square meters)  •  Stage 2 Mild CKD (GFR = 60-89 mL/min/1 73 square meters)  •  Stage 3A Moderate CKD (GFR = 45-59 mL/min/1 73 square meters)  •  Stage 3B Moderate CKD (GFR = 30-44 mL/min/1 73 square meters)  •  Stage 4 Severe CKD (GFR = 15-29 mL/min/1 73 square meters)  •  Stage 5 End Stage CKD (GFR <15 mL/min/1 73 square meters)  Note: GFR calculation is accurate only with a steady state creatinine    Hepatitis panel, acute [[id number]] Collected: 11/07/22 1717    Lab Status:  In process Specimen: Blood from Arm, Right Updated: 11/07/22 1721    Acetaminophen level-If concentration is detectable, please discuss with medical  on call   [[id number]]  (Abnormal) Collected: 11/07/22 1541    Lab Status: Final result Specimen: Blood from Arm, Left Updated: 11/07/22 1608     Acetaminophen Level <10 ug/mL     Narrative:      Hemolysis    Salicylate level [[id number]]  (Abnormal) Collected: 11/07/22 1541    Lab Status: Final result Specimen: Blood from Arm, Left Updated: 63/18/98 9519     Salicylate Lvl <1 7 mg/dL     Narrative:      Hemolysis    Ethanol [[id number]]  (Normal) Collected: 11/07/22 1541    Lab Status: Final result Specimen: Blood from Arm, Left Updated: 11/07/22 1608     Ethanol Lvl <10 mg/dL     Magnesium [[id number]]  (Normal) Collected: 11/07/22 1541    Lab Status: Final result Specimen: Blood from Arm, Left Updated: 11/07/22 1608     Magnesium 1 8 mg/dL     Narrative:      Hemolysis    Comprehensive metabolic panel [[id number]]  (Abnormal) Collected: 11/07/22 1541    Lab Status: Final result Specimen: Blood from Arm, Left Updated: 11/07/22 1608     Sodium 137 mmol/L      Potassium 5 8 mmol/L      Chloride 102 mmol/L      CO2 27 mmol/L      ANION GAP 8 mmol/L      BUN 12 mg/dL      Creatinine 0 70 mg/dL      Glucose 87 mg/dL      Calcium 9 4 mg/dL      AST 89 U/L      ALT 71 U/L      Alkaline Phosphatase 74 U/L      Total Protein 8 9 g/dL      Albumin 4 7 g/dL      Total Bilirubin 1 36 mg/dL      eGFR 130 ml/min/1 73sq m     Narrative:      Hemolysis  [employer] Kid[employer] guidelines for Chronic Kidney Disease (CKD):   •  Stage 1 with normal or high GFR (GFR > 90 mL/min/1 73 square meters)  •  Stage 2 Mild CKD (GFR = 60-89 mL/min/1 73 square meters)  •  Stage 3A Moderate CKD (GFR = 45-59 mL/min/1 73 square meters)  •  Stage 3B Moderate CKD (GFR = 30-44 mL/min/1 73 square meters)  •  Stage 4 Severe CKD (GFR = 15-29 mL/min/1 73 square meters)  •  Stage 5 End Stage CKD (GFR <15 mL/min/1 73 square meters)  Note: GFR calculation is accurate only with a steady state creatinine    CBC and differential [[id number]]  (Abnormal) Collected: 11/07/22 1541    Lab Status: Final result Specimen: Blood from Arm, Left Updated: 11/07/22 1548     WBC 11 08 Thousand/uL      RBC 4 82 Million/uL      Hemoglobin 14 9 g/dL      Hematocrit 45 8 %      MCV 95 fL      MCH 30 9 pg      MCHC 32 5 g/dL      RDW 12 8 %      MPV 11 1 fL      Platelets 773 Thousands/uL      nRBC 0 /100 WBCs      Neutrophils Relative 81 %      Immat GRANS % 0 %      Lymphocytes Relative 15 %      Monocytes Relative 4 %      Eosinophils Relative 0 %      Basophils Relative 0 %      Neutrophils Absolute 8 85 Thousands/µL      Immature Grans Absolute 0 04 Thousand/uL      Lymphocytes Absolute 1 71 Thousands/µL      Monocytes Absolute 0 43 Thousand/µL      Eosinophils Absolute 0 02 Thousand/µL      Basophils Absolute 0 03 Thousands/µL     Rapid drug screen, urine [[id number]]     Lab Status: No result Specimen: Urine                  No orders to display              Procedures  Procedures         ED Course  ED Course as of 11/07/22 1823   Mon Nov 07, 2022   1551 WBC(!): 11 08                                             MDM  Number of Diagnoses or Management Options  Heroin abuse ([facility] [address] ): established and worsening  Opioid use disorder, severe, dependence ([facility] [address] ): established and worsening  Opioid withdrawal ([address] ): established and worsening  Polysubstance abuse ([facility] [address] ): established and worsening  Diagnosis management comments: Patient is a 26-year-old male coming in for detox evaluation from heroin, cocaine, marijuana  Patient is in no acute distress this time    Admitted to detox for further evaluation    Counseling: I had a detailed discussion with the patient and/or guardian regarding: the historical points, exam findings, and any diagnostic results supporting the discharge diagnosis, lab results, radiology results, discharge instructions reviewed with patient and/or family/caregiver and understanding was verbalized  Instructions given to return to the emergency department if symptoms worsen or persist, or if there are any questions or concerns that arise at home       All labs reviewed and utilized in the medical decision making process     All radiology studies independently viewed by me and interpreted by the radiologist     Portions of the record may have been created with voice recognition software   Occasional wrong word or "sound a like" substitutions may have occurred due to the inherent limitations of voice recognition software   Read the chart carefully and recognize, using context, where substitutions have occurred           Amount and/or Complexity of Data Reviewed  Clinical lab tests: ordered and reviewed    Risk of Complications, Morbidity, and/or Mortality  Presenting problems: low  Diagnostic procedures: minimal  Management options: minimal    Patient Progress  Patient progress: stable      Disposition  Final diagnoses:   Heroin abuse ([place] [address] )   Polysubstance abuse ([place] [address] )   Opioid withdrawal ([place] [address] )   Opioid use disorder, severe, dependence ([place] [address] )     Time reflects when diagnosis was documented in both MDM as applicable and the Disposition within this note     Time User Action Codes Description Comment    11/7/2022  5:38 PM Ta Fredi Add [F19 10] Substance abuse ([place] [address] )     11/7/2022  5:38 PM Ta Fredi Add [F11 10] Heroin abuse ([place] [address] )     11/7/2022  5:38 PM Ta Fredi Add [F19 10] Polysubstance abuse ([place] [address] )     11/7/2022  5:38 PM Ta Fredi Modify [F11 10] Heroin abuse ([place] [address] )     11/7/2022  5:38 PM Ta Fredi Remove [F19 10] Substance abuse ([facility] )     11/7/2022  5:38 PM Ta Fredi Add [F11 93] Opioid withdrawal ([place] [address] )     11/7/2022  5:40 PM Lenny Sydni Add [F11 20] Opioid use disorder, severe, dependence [facility])       ED Disposition     ED Disposition   Admit    Condition   Stable    Date/Time   Mon Nov 7, 2022  5:38 PM    Comment Case was discussed with Isabela Friend PA-C and the patient's admission status was agreed to be Admission Status: observation status to the service of Dr Zeynep Mullen   Follow-up Information    None         Current Discharge Medication List      CONTINUE these medications which have NOT CHANGED    Details   QUEtiapine (SEROquel) 50 mg tablet Take 50 mg by mouth daily at bedtime      calcium carbonate (TUMS) 500 mg chewable tablet Chew 1 tablet (500 mg total) 3 (three) times a day as needed for indigestion or heartburn  Qty: 30 tablet, Refills: 0    Associated Diagnoses: Intractable nausea and vomiting      cyclobenzaprine (FLEXERIL) 10 mg tablet Take 1 tablet (10 mg total) by mouth 2 (two) times a day as needed for muscle spasms  Qty: 20 tablet, Refills: 0    Associated Diagnoses: Flank pain      naproxen (NAPROSYN) 500 mg tablet Take 1 tablet (500 mg total) by mouth 2 (two) times a day with meals  Qty: 30 tablet, Refills: 0    Associated Diagnoses: Flank pain      ondansetron (ZOFRAN-ODT) 4 mg disintegrating tablet Take 1 tablet (4 mg total) by mouth every 6 (six) hours as needed for nausea or vomiting  Qty: 20 tablet, Refills: 0    Associated Diagnoses: Intractable nausea and vomiting      pantoprazole (PROTONIX) 40 mg tablet Take 1 tablet (40 mg total) by mouth daily  Qty: 30 tablet, Refills: 0    Associated Diagnoses: Intractable nausea and vomiting             No discharge procedures on file      PDMP Review     None          ED Provider  Electronically Signed by           Osei Kraus PA-C  11/07/22 9442

## 2022-11-07 NOTE — ASSESSMENT & PLAN NOTE
· Currently reports to using 7 bags of fentanyl daily intravenously x 7 months   · Last use was 11/7/22 around 9 am   · Currently not exhibiting or reporting withdrawal symptoms   · Initiate Butrans 20 mcg/hr patch   · Continue monitoring opioid withdrawal, consider microinduction when >24 hours have passed since pt's last opioid use    · Symptomatic and supportive care  · Continuous pulse oximetry monitoring

## 2022-11-07 NOTE — ASSESSMENT & PLAN NOTE
Recent Labs     11/07/22  1541   WBC 11 08*   · Elevated in the setting of opioid withdrawal  · Patient is afebrile  · Denies any associated symptoms of fever, chills, diaphoresis, cough or congestion  · Continue to monitor, repeat CBC in AM

## 2022-11-07 NOTE — CERTIFIED RECOVERY SPECIALIST
Certified  Note    Patient name: Keo Parrish  Location: ED 08/ED [address]: [address]  Attending:  Juanjose Abarca, * MRN [id number]  : 1995  Age: 33 y o  Sex: male Date 2022         Substance Use History:     Social History     Substance and Sexual Activity   Alcohol Use Not Currently        Social History     Substance and Sexual Activity   Drug Use Yes   • Types: Marijuana, Cocaine, Heroin, Fentanyl     CRS provided recovery resources for patient        Joselyn Alvarado

## 2022-11-07 NOTE — H&P
HISTORY & PHYSICAL EXAM  DEPARTMENT OF MEDICAL TOXICOLOGY  LEVEL 4 MEDICAL DETOX UNIT  Lucy Chilel 33 y o  male MRN: [id number]  Unit/Bed#: GEORGE Encounter: [id number]      Reason for Admission/Principal Problem: Opioid withdrawal, opioid use disorder  Admitting Provider: Мария Fong  Attending Provider: Mehul Coughlin MD   11/7/2022  2:51 PM      * Opioid withdrawal ([place] [address] )  Assessment & Plan  · Currently reports to using 7 bags of fentanyl daily intravenously x 7 months   · Last use was 11/7/22 around 9 am   · Currently not exhibiting or reporting withdrawal symptoms   · Initiate Butrans 20 mcg/hr patch   · Continue monitoring opioid withdrawal, consider microinduction when >24 hours have passed since pt's last opioid use    · Symptomatic and supportive care  · Continuous pulse oximetry monitoring      Opioid use disorder, severe, dependence ([place] [address] )  Assessment & Plan  Patient with a history of chronic fentanyl use  Uses 7 bags daily via intravenously  History of prior inpatient detox with prescription of  suboxone in the past  PDMP reviewed no recent prescriptions for buprenorphine/ naloxone  Management of opioid withdrawal under MAT protocol as above  Will Screen for hepatitis/HIV with h/o IVDU  Case management consulted for assistance with rehab resources - pt expresses interest in outpatient drug and alcohol after d/c    Leukocytosis  Assessment & Plan  Recent Labs     11/07/22  1541   WBC 11 08*   · Elevated in the setting of opioid withdrawal  · Patient is afebrile  · Denies any associated symptoms of fever, chills, diaphoresis, cough or congestion  · Continue to monitor, repeat CBC in AM      Polysubstance abuse ([place] [address] )  Assessment & Plan  · Patient endorses daily use of fentanyl, cocaine and marijuana  · Last used cocaine two days ago- denies any chest pain or palpitations  · Encourage Cessation     Elevated LFTs  Assessment & Plan  Recent Labs     11/07/22  1717   AST 78*   ALT 84* · Elevated in the setting of chronic opioid use with history of IVDA  · Hepatitis and Rapid HIV pending  · Continue to monitor  · Encourage Cessation           Additional medical treatment(s) includes: none        VTE Prophylaxis: Enoxaparin (Lovenox)  / sequential compression device   Code Status: Full Code per patient      Anticipated Length of Stay:  Patient will be admitted on an Observation basis with an anticipated length of stay of  1  midnights  Justification for Hospital Stay: opioid withdrawal     For any questions or concerns, please Tiger Text the advanced practitioner in the role of [facility] On Call      This patient qualifies for Level IV medically managed intensive inpatient services under the criteria set by the American Society of Addiction Medicine, including dimensions 1-3  The patient is in withdrawal (or is intoxicated with high risk of withdrawal), with severe and unstable medical and/or psychiatric (dual diagnosis) problems, requiring requires 24-hour medical and nursing care and the full resources of a [address] patient to medical detox unit and continue supportive care and stabilization of acute opioid withdrawal per medical toxicology/detox medication assisted treatment pathway  Monitor opioid severity via Clinical Opioid Withdrawal Scale (COWS) Q4 hours and administer buprenorphine/naloxone 8mg/2mg when COWS >8, or when greater than 24 hours have elapsed from most recent opioid use (excluding long-acting opioids, such as methadone)  Continue to monitor opioid severity Q30-60 minutes after first dose and administer additional buprenorphine 2-4mg every 30-60 minutes until COWS < 8 for two consecutive hours  (Max dose 32 mg)               Adjunctive medications administered as needed:  Clonidine 0 1 mg PO Q6 hours PRN anxiety or palpitations    Gabapentin 300mg PO Q8 hours PRN anxiety    Ibuprofen 600 mg PO Q6 hours PRN pain    Acetaminophen 1000mg PO Q8 hours PRN pain    Ondansetron 4 mg PO Q6 hours PRN N/V    Nicotine patch 7, 14, 21 mg  PRN nicotine withdrawal   Trazodone 50 mg PO QHS PRN sleep    Loperamide 4 mg PO PRN diarrhea up to 16 mg/day       The risks, benefits and mechanism of buprenorphine/naloxone were discussed and patient agreed to treatment  Case management consultation will take place to assist with coordination of subsequent treatment after discharge  Administer daily multivitamin  Evaluate and treat for coexisting substance use, such as nicotine  Discuss risk factors for infectious disease, such as history of intravenous drug abuse, and offer hepatitis and HIV screening if indicated  Hx and PE limited by:  None     HPI: Dex Logan is a 33y o  year old male who presents with acute opioid withdrawal  Patient has a past medical history significant for opioid use disorder, anxiety, polysubstance use disorder, and history of IVDA  Patient reported to [facility] ED requesting opioid detoxification  He states that for the past 6-7 months he has been using 7 bags of fentanyl daily intravenously  Patient reports injecting into bilateral AC's, denies sharing needles  Patient also reports occasionally mixing cocaine with the fentanyl before injection  Last used cocaine two days ago  At this time patient is interested in detox with initiation of suboxone and to continue with outpatient MAT upon discharge  Patient denies any opioid withdrawal symptoms at this time  He also denies any headache, fever, chills, chest pain, abdominal pain or shortness of breath  Opioids currently used: fentanyl  Route of use: intravenous  Date/Time of Last Opioid Use: 11/7/22 9 am   Current Signs/Symptoms of Opioid Withdrawal: none     COWS score:   Clinical Opiate Withdrawal Scale  Pulse: 69      Methadone & Buprenorphine History  History of prior treatment for opioid dependence?  yes  Currently on Methadone Maintenance? no  History of prior treatment with Suboxone? yes  Currently taking Suboxone? no  History of using Suboxone without having a prescription? no  History of IVDA? yes  Co-existing substance use? yes    Review of PDMP: no    Social History     Substance and Sexual Activity   Alcohol Use Not Currently     Social History     Substance and Sexual Activity   Drug Use Yes   • Types: Marijuana, Cocaine, Heroin, Fentanyl     Social History     Tobacco Use   Smoking Status Current Every Day Smoker   • Packs/day: 0 50   Smokeless Tobacco Never Used       Review of Systems   Constitutional: Negative for appetite change, chills, diaphoresis and fever  HENT: Negative for rhinorrhea  Respiratory: Negative for chest tightness and shortness of breath  Cardiovascular: Negative for chest pain  Gastrointestinal: Negative for nausea and vomiting  Skin: Negative for wound  Neurological: Negative for tremors and headaches  Psychiatric/Behavioral: Negative for hallucinations and suicidal ideas  The patient is not nervous/anxious  Historical Information   History reviewed  No pertinent past medical history  History reviewed  No pertinent surgical history  History reviewed  No pertinent family history  Social History   Marital Status: Single   Occupation: Unemployed  Patient Pre-hospital Living Situation: homeless  Patient Pre-hospital Level of Mobility: fully independent  Patient Pre-hospital Diet Restrictions: none     No Known Allergies    Prior to Admission medications    Medication Sig Start Date End Date Taking?  Authorizing Provider   calcium carbonate (TUMS) 500 mg chewable tablet Chew 1 tablet (500 mg total) 3 (three) times a day as needed for indigestion or heartburn  Patient not taking: No sig reported 8/13/21   Gerson Grover MD   cyclobenzaprine (FLEXERIL) 10 mg tablet Take 1 tablet (10 mg total) by mouth 2 (two) times a day as needed for muscle spasms  Patient not taking: No sig reported 10/3/21   Xiomara Ragsdale MD   naproxen (NAPROSYN) 500 mg tablet Take 1 tablet (500 mg total) by mouth 2 (two) times a day with meals  Patient not taking: No sig reported 10/3/21   Xiomara Ragsdale MD   ondansetron (ZOFRAN-ODT) 4 mg disintegrating tablet Take 1 tablet (4 mg total) by mouth every 6 (six) hours as needed for nausea or vomiting  Patient not taking: No sig reported 8/13/21   Santiago Riggs MD   pantoprazole (PROTONIX) 40 mg tablet Take 1 tablet (40 mg total) by mouth daily  Patient not taking: No sig reported 8/13/21   Santiago Riggs MD       Current Facility-Administered Medications   Medication Dose Route Frequency   • cloNIDine (CATAPRES) tablet 0 2 mg  0 2 mg Oral Q6H PRN   • ondansetron (ZOFRAN) injection 4 mg  4 mg Intravenous Q4H PRN   • sodium chloride 0 9 % bolus 1,000 mL  1,000 mL Intravenous Once       Continuous Infusions:          Objective     No intake or output data in the 24 hours ending 11/07/22 1818    Invasive Devices:   Peripheral IV 11/07/22 Left Antecubital (Active)   Site Assessment WDL 11/07/22 1722       Vitals   Vitals:    11/07/22 1356 11/07/22 1726   BP: 143/85 107/56   TempSrc: Oral    Pulse: (!) 130 69   Resp: 18 20   Patient Position - Orthostatic VS: Sitting Lying   Temp: 98 2 °F (36 8 °C)        Physical Exam  Constitutional:       Appearance: He is not ill-appearing or diaphoretic  Eyes:      Comments: Pinpoint   Cardiovascular:      Rate and Rhythm: Normal rate and regular rhythm  Heart sounds: No murmur heard  Pulmonary:      Effort: No respiratory distress  Breath sounds: Normal breath sounds  Abdominal:      Tenderness: There is no abdominal tenderness  Neurological:      Mental Status: He is alert and oriented to person, place, and time  Motor: No tremor  Psychiatric:         Mood and Affect: Mood is not anxious or depressed  Thought Content: Thought content does not include suicidal ideation   Thought content does not include suicidal plan  DATA    EKG, Pathology, and Other Studies: I have personally reviewed pertinent reports  EKG: Pending     Lab Results: I have personally reviewed pertinent reports          CBC ETOH     Lab Results   Component Value Date    WBC 11 08 (H) 11/07/2022    RBC 4 82 11/07/2022    HGB 14 9 11/07/2022    HCT 45 8 11/07/2022    MCV 95 11/07/2022    MCH 30 9 11/07/2022    MCHC 32 5 11/07/2022    RDW 12 8 11/07/2022     11/07/2022    MPV 11 1 11/07/2022      Lab Results   Component Value Date    LACTICACID 0 7 08/12/2021      CMP UA         Component Value Date/Time    K 4 4 11/07/2022 1717    CL 99 11/07/2022 1717    CO2 33 (H) 11/07/2022 1717    BUN 13 11/07/2022 1717    CREATININE 0 75 11/07/2022 1717         Component Value Date/Time    CALCIUM 9 9 11/07/2022 1717    ALKPHOS 81 11/07/2022 1717    AST 78 (H) 11/07/2022 1717    ALT 84 (H) 11/07/2022 1717      Lab Results   Component Value Date    CLARITYU Slightly Cloudy (A) 10/03/2021    COLORU Yellow 10/03/2021    SPECGRAV 1 015 10/03/2021    PHUR 8 0 10/03/2021    GLUCOSEU Negative 10/03/2021    KETONESU 5 (Trace) (A) 10/03/2021    BLOODU Negative 10/03/2021    PROTEIN UA Negative 10/03/2021    NITRITE Negative 10/03/2021    BILIRUBINUR Negative 10/03/2021    UROBILINOGEN Negative 10/03/2021    LEUKOCYTESUR Negative 10/03/2021        Liver Function Test: ASA     Lab Results   Component Value Date    TBILI 0 93 11/07/2022    ALKPHOS 81 11/07/2022    AST 78 (H) 11/07/2022    ALT 84 (H) 11/07/2022    TP 9 9 (H) 11/07/2022    ALB 4 9 11/07/2022      Lab Results   Component Value Date    SALICYLATE <3 2 (L) 05/86/3663      Troponin APAP     No results found for: TROPONINI   Lab Results   Component Value Date    ACTMNPHEN <10 (L) 11/07/2022      VBG HCG     No results found for: PHVEN, JHH3LPK, PO2VEN, CBE5FSF, BEVEN, Z5DOSYFKA, P3BXMMR   No results found for: HCGQUANT   ABG Urine Drug Screen     No results found for: PHART, IIC0YOR, PO2ART, LTR3ASS, BEART, C1NONOEYG, O2HGB, SOURC, LC, VTAC, ACRATE, INSPIREDAIR, PEEP   Lab Results   Component Value Date    AMPMETHUR Negative 09/30/2022    BARBTUR Negative 09/30/2022    BDZUR Negative 09/30/2022    COCAINEUR Positive (A) 09/30/2022    METHADONEUR Negative 09/30/2022    OPIATEUR Negative 09/30/2022    PCPUR Negative 09/30/2022    THCUR Positive (A) 09/30/2022    OXYCODONEUR Negative 09/30/2022      Lactate INR     Lab Results   Component Value Date    LACTICACID 0 7 08/12/2021      No results found for: INR   PTT Protime     No results found for: PTT   No results found for: PROTIME   Hepatitis HIV     Lab Results   Component Value Date    HEPBSAG Non-reactive 08/13/2021    HEPCAB High Reactive (A) 08/13/2021      Lab Results   Component Value Date    [id number] Non-Reactive 08/13/2021    [id number] Non-Reactive 08/13/2021            Imaging Studies: I have personally reviewed pertinent reports  Counseling / Coordination of Care  Total floor / unit time spent today 35  minutes  Greater than 50% of total time was spent with the patient and / or family counseling and / or coordination of care  ** Please Note: This note has been constructed using a voice recognition system   **

## 2022-11-07 NOTE — ASSESSMENT & PLAN NOTE
Recent Labs     11/07/22  1717   AST 78*   ALT 84*   · Elevated in the setting of chronic opioid use with history of IVDA  · Hepatitis and Rapid HIV pending  · Continue to monitor  · Encourage Cessation

## 2022-11-08 VITALS
SYSTOLIC BLOOD PRESSURE: 110 MMHG | HEIGHT: 65 IN | OXYGEN SATURATION: 98 % | WEIGHT: 138.7 LBS | HEART RATE: 61 BPM | TEMPERATURE: 97.4 F | RESPIRATION RATE: 16 BRPM | BODY MASS INDEX: 23.11 KG/M2 | DIASTOLIC BLOOD PRESSURE: 61 MMHG

## 2022-11-08 PROBLEM — D72.829 LEUKOCYTOSIS: Status: RESOLVED | Noted: 2022-11-07 | Resolved: 2022-11-08

## 2022-11-08 LAB
ALBUMIN SERPL BCP-MCNC: 3.3 G/DL (ref 3.5–5)
ALP SERPL-CCNC: 61 U/L (ref 43–122)
ALT SERPL W P-5'-P-CCNC: 59 U/L
ANION GAP SERPL CALCULATED.3IONS-SCNC: 4 MMOL/L (ref 5–14)
AST SERPL W P-5'-P-CCNC: 58 U/L (ref 17–59)
BILIRUB SERPL-MCNC: 0.43 MG/DL (ref 0.2–1)
BUN SERPL-MCNC: 13 MG/DL (ref 5–25)
CALCIUM ALBUM COR SERPL-MCNC: 9.3 MG/DL (ref 8.3–10.1)
CALCIUM SERPL-MCNC: 8.7 MG/DL (ref 8.4–10.2)
CHLORIDE SERPL-SCNC: 102 MMOL/L (ref 96–108)
CO2 SERPL-SCNC: 29 MMOL/L (ref 21–32)
CREAT SERPL-MCNC: 0.64 MG/DL (ref 0.7–1.5)
ERYTHROCYTE [DISTWIDTH] IN BLOOD BY AUTOMATED COUNT: 12.9 % (ref 11.6–15.1)
GFR SERPL CREATININE-BSD FRML MDRD: 135 ML/MIN/1.73SQ M
GLUCOSE P FAST SERPL-MCNC: 80 MG/DL (ref 70–99)
GLUCOSE SERPL-MCNC: 80 MG/DL (ref 70–99)
HAV IGM SER QL: ABNORMAL
HBV CORE IGM SER QL: ABNORMAL
HBV SURFACE AG SER QL: ABNORMAL
HCT VFR BLD AUTO: 40.4 % (ref 36.5–49.3)
HCV AB SER QL: ABNORMAL
HGB BLD-MCNC: 13.4 G/DL (ref 12–17)
HIV 1+2 AB+HIV1 P24 AG SERPL QL IA: NORMAL
HIV1 P24 AG SER QL: NORMAL
MCH RBC QN AUTO: 31.5 PG (ref 26.8–34.3)
MCHC RBC AUTO-ENTMCNC: 33.2 G/DL (ref 31.4–37.4)
MCV RBC AUTO: 95 FL (ref 82–98)
PLATELET # BLD AUTO: 163 THOUSANDS/UL (ref 149–390)
PMV BLD AUTO: 12.7 FL (ref 8.9–12.7)
POTASSIUM SERPL-SCNC: 3.6 MMOL/L (ref 3.5–5.3)
PROT SERPL-MCNC: 6.9 G/DL (ref 6.4–8.4)
RBC # BLD AUTO: 4.25 MILLION/UL (ref 3.88–5.62)
SODIUM SERPL-SCNC: 135 MMOL/L (ref 135–147)
WBC # BLD AUTO: 7.87 THOUSAND/UL (ref 4.31–10.16)

## 2022-11-08 RX ADMIN — ENOXAPARIN SODIUM 40 MG: 100 INJECTION SUBCUTANEOUS at 08:24

## 2022-11-08 RX ADMIN — MULTIPLE VITAMINS W/ MINERALS TAB 1 TABLET: TAB ORAL at 08:24

## 2022-11-08 RX ADMIN — NALOXONE HYDROCHLORIDE 4 MG: 4 SPRAY NASAL at 11:22

## 2022-11-08 NOTE — ASSESSMENT & PLAN NOTE
· Currently reports to using 7 bags of fentanyl daily intravenously x 7 months   · Last use was 11/7/22 around 9 am   · Currently not exhibiting or reporting withdrawal symptoms of anxiety   · Patient no longer expressing interest in low dose initiation of buprenorphine and requesting discharge     · Provider attempted educating patient, not agreeable to continue with detox  · Narcan provided to patient upon discharge

## 2022-11-08 NOTE — ASSESSMENT & PLAN NOTE
Recent Labs     11/08/22  0520   WBC 7 87   · Elevated in the setting of opioid withdrawal  · Patient is afebrile  · Denies any associated symptoms of fever, chills, diaphoresis, cough or congestion  · Continue to monitor, repeat CBC in AM

## 2022-11-08 NOTE — ASSESSMENT & PLAN NOTE
Patient with a history of chronic fentanyl use  H/o hepatitis C   Uses 7 bags daily via intravenously  Hisitory of prior inpatient detox with prescription of  suboxone in the past  PDMP reviewed no recent prescriptions for buprenorphine/ naloxone     Management of opioid withdrawal under MAT protocol as above  Patient requesting discharge, not interested in following up with MAT or induction of suboxone

## 2022-11-08 NOTE — CASE MANAGEMENT
Case Management Discharge Planning Note    Patient name Maritza Bennett  Location 5T DETOX 508/5T DETOX 50* MRN [id number]  : 1995 Date 2022       Current Admission Date: 2022  Current Admission Diagnosis:Opioid withdrawal [facility])   Patient Active Problem List    Diagnosis Date Noted   • Opioid use disorder, severe, dependence ([facility] [address] ) 2022   • Opioid withdrawal ([facility] ) 2022   • Polysubstance abuse ([facility] ) 2022   • Leukocytosis 2022   • Intractable nausea and vomiting 2021   • Colitis with rectal bleeding 2021   • Intravenous drug abuse ([facility] ) 2021   • Elevated LFTs 2021   • Hypokalemia 2021   • Homelessness 2021      LOS (days): 0  Geometric Mean LOS (GMLOS) (days):   Days to GMLOS:     OBJECTIVE:            Current admission status: Observation   Preferred Pharmacy:   [address] [place] [address],[address], [address]  [address]  Phone: [phone] Fax: [phone]    Primary Care Provider: No primary care provider on file  Primary Insurance: [address] [place] [address]  Secondary Insurance:     DISCHARGE DETAILS: Pt presented to the nurses station and requested to leave  Pt declined to be started on Suboxone at this time  Pt reports he will walk home  Pt declined any aftercare at this time, resources were provided on his discharge instructions         Discharge planning discussed with[de-identified] Patient  Freedom of Choice: Yes                   Contacts  Patient Contacts: Pt declined all resources              Other Referral/Resources/Interventions Provided:  Referrals Provided[de-identified] Declines resources

## 2022-11-08 NOTE — PLAN OF CARE
Problem: Potential for Falls  Goal: Patient will remain free of falls  Description: INTERVENTIONS:  - Educate patient/family on patient safety including physical limitations  - Instruct patient to call for assistance with activity   - Consult OT/PT to assist with strengthening/mobility   - Keep Call bell within reach  - Keep bed low and locked with side rails adjusted as appropriate  - Keep care items and personal belongings within reach  - Initiate and maintain comfort rounds  - Make Fall Risk Sign visible to staff  - Apply yellow socks and bracelet for high fall risk patients  - Consider moving patient to room near nurses station  Outcome: Progressing     Problem: SUBSTANCE USE/ABUSE  Goal: By discharge, will develop insight into their chemical dependency and sustain motivation to continue in recovery  Description: INTERVENTIONS:  - Attends all daily group sessions and scheduled AA groups  - Actively practices coping skills through participation in the therapeutic community and adherence to program rules  - Reviews and completes assignments from individual treatment plan  - Assist patient development of understanding of their personal cycle of addiction and relapse triggers  Outcome: Progressing  Goal: By discharge, patient will have ongoing treatment plan addressing chemical dependency  Description: INTERVENTIONS:  - Assist patient with resources and/or appointments for ongoing recovery based living  Outcome: Progressing     Problem: SAFETY ADULT  Goal: Patient will remain free of falls  Description: INTERVENTIONS:  - Educate patient/family on patient safety including physical limitations  - Instruct patient to call for assistance with activity   - Consult OT/PT to assist with strengthening/mobility   - Keep Call bell within reach  - Keep bed low and locked with side rails adjusted as appropriate  - Keep care items and personal belongings within reach  - Initiate and maintain comfort rounds  - Make Fall Risk Sign visible to staff  - Apply yellow socks and bracelet for high fall risk patients  - Consider moving patient to room near nurses station  Outcome: Progressing  Goal: Maintain or return to baseline ADL function  Description: INTERVENTIONS:  -  Assess patient's ability to carry out ADLs; assess patient's baseline for ADL function and identify physical deficits which impact ability to perform ADLs (bathing, care of mouth/teeth, toileting, grooming, dressing, etc )  - Assess/evaluate cause of self-care deficits   - Assess range of motion  - Assess patient's mobility; develop plan if impaired  - Assess patient's need for assistive devices and provide as appropriate  - Encourage maximum independence but intervene and supervise when necessary  - Involve family in performance of ADLs  - Assess for home care needs following discharge   - Consider OT consult to assist with ADL evaluation and planning for discharge  - Provide patient education as appropriate  Outcome: Progressing  Goal: Maintains/Returns to pre admission functional level  Description: INTERVENTIONS:  - Perform BMAT or MOVE assessment daily    - Set and communicate daily mobility goal to care team and patient/family/caregiver     - Collaborate with rehabilitation services on mobility goals if consulted  - Out of bed for toileting  - Record patient progress and toleration of activity level   Outcome: Progressing     Problem: DISCHARGE PLANNING  Goal: Discharge to home or other facility with appropriate resources  Description: INTERVENTIONS:  - Identify barriers to discharge w/patient and caregiver  - Arrange for needed discharge resources and transportation as appropriate  - Identify discharge learning needs (meds, wound care, etc )  - Arrange for interpretive services to assist at discharge as needed  - Refer to Case Management Department for coordinating discharge planning if the patient needs post-hospital services based on physician/advanced practitioner order or complex needs related to functional status, cognitive ability, or social support system  Outcome: Progressing     Problem: Knowledge Deficit  Goal: Patient/family/caregiver demonstrates understanding of disease process, treatment plan, medications, and discharge instructions  Description: Complete learning assessment and assess knowledge base    Interventions:  - Provide teaching at level of understanding  - Provide teaching via preferred learning methods  Outcome: Progressing

## 2022-11-08 NOTE — NURSING NOTE
Patient discharged, did not wait to stay for Suboxone induction  IV removed, belongings accounted for  Patient did not want to wait for security to return his lighters  Patient did not want to wait for AVS  Narcan kit given to patient, education provided and patient stated understanding  Patient ambulated off unit without staff escort

## 2022-11-08 NOTE — DISCHARGE INSTR - OTHER ORDERS
HOW TO GET SUBSTANCE ABUSE HELP:  If you or someone you know has a drug or alcohol problem, there is help:  [address]: [address]: [phone]  An assessment is the first step  In addition to those listed there are other programs available in the area but assessment is best to determine an appropriate level of care  If you [address], an assessment can be scheduled at one of these providers:  [place] [facility]  [address] , [place], [address] [facility]  [address]  [address], [address]  [address]  D&A Intake Unit  [address] [facility] [address] , [address]st Floor, [place], [address]  [phone] [address], [address] [facility],[address]Th Floor, [place], [address] [place] [address] [place] [address] [facility]  [address] , [place], [address]  [address]  [address]  [facility]  [phone]   [facility])  [address]  [address] [facility], [place], [address]  [address] [employer]  [facility], [facility]   [address] , [place], [facility]  [address] [facility]  [address] [facility] , [place], [address] [facility]  [address] , [address], [place], [facility]

## 2022-11-08 NOTE — DISCHARGE SUMMARY
[facility]  Discharge- Lesly Schmid 1995, 33 y o  male MRN: [id number]  Unit/Bed#: 5T DETOX 508-01 Encounter: [id number]  Primary Care Provider: No primary care provider on file  Date and time admitted to hospital: 11/7/2022  2:51 PM     [address], LEVEL 4  Department of Medical Toxicology  Reason for Admission/Principal Problem: opioid withdrawal   Admitting provider: Dimitry Poole  No att  providers found   11/7/2022  2:51 PM       Discharging Physician / Practitioner: Dimitry Poole  PCP: No primary care provider on file  Admission Date:   Admission Orders (From admission, onward)     Ordered        11/07/22 1832  Place in Observation  Once            11/07/22 1739  Place in Observation  Once                      Discharge Date: 11/08/22    Medical Problems             Resolved Problems  Never Reviewed          Resolved    Leukocytosis 11/8/2022     Resolved by  Dimitry Poole PA-C                * Opioid withdrawal [facility])  Assessment & Plan  · Currently reports to using 7 bags of fentanyl daily intravenously x 7 months   · Last use was 11/7/22 around 9 am   · Currently not exhibiting or reporting withdrawal symptoms of anxiety   · Patient no longer expressing interest in low dose initiation of buprenorphine and requesting discharge  · Provider attempted educating patient, not agreeable to continue with detox  · Narcan provided to patient upon discharge         Opioid use disorder, severe, dependence ([facility] [address] )  Assessment & Plan  Patient with a history of chronic fentanyl use  H/o hepatitis C   Uses 7 bags daily via intravenously  Hisitory of prior inpatient detox with prescription of  suboxone in the past  PDMP reviewed no recent prescriptions for buprenorphine/ naloxone     Management of opioid withdrawal under MAT protocol as above  Patient requesting discharge, not interested in following up with MAT or induction of suboxone Polysubstance abuse ([facility] [address] )  Assessment & Plan  · Patient endorses daily use of fentanyl, cocaine and marijuana  · Last used cocaine two days ago- denies any chest pain or palpitations  · Encourage Cessation     Elevated LFTs  Assessment & Plan  Recent Labs     11/08/22  0520   AST 58   ALT 59*   · Elevated in the setting of chronic opioid use with history of IVDA, history of hepatitis C    · Rapid HIV negative   · Continue to monitor  · Encourage Cessation - recommend outpatient follow up upon discharge     Leukocytosis-resolved as of 11/8/2022  Assessment & Plan  Recent Labs     11/08/22  0520   WBC 7 87   · Elevated in the setting of opioid withdrawal  · Patient is afebrile  · Denies any associated symptoms of fever, chills, diaphoresis, cough or congestion  · Continue to monitor, repeat CBC in AM          Consultations During Hospital Stay:  · None    Procedures Performed:   · None    Significant Findings / Test Results:   · Leukocytosis- resolved  · Chronic Hepatitis C     Incidental Findings:   · None     Test Results Pending at Discharge (will require follow up): · None     Outpatient Tests Requested:  · Follow up with PCP     Complications:  none    Reason for Admission: opioid withdrawal     Hospital Course:     Tim Eaton is a 33 y o  male patient who originally presented to the hospital on 11/7/2022 due to acute opioid withdrawal  Patient reported to [facility] ED requesting detoxification from opioids and induction of suboxone  He reported using 7 bags of fentanyl intravenously  Upon admission a transdermal buprenorphine patch was applied as it had been < 24 hours since patient's last reported use  On hospital day 1 patient reported withdrawal symptoms of anxiety  He was treated with supportive care during this time  This afternoon patient requested immediate discharge as he states he longer is interested in continue with opioid detoxification and suboxone induction   Patient was provided with outpatient resources and narcan upon discharge  He would not wait for discharge paperwork and left the unit abruptly,       Please see above list of diagnoses and related plan for additional information  Condition at Discharge: stable     Discharge Day Visit / Exam:     Subjective:  Patient reports worsening anxiety  Requesting discharge as he no longer wishes to proceed with detox  Offered to treat patient's anxiety with medication, patient declined stating that he no longer wants to start suboxone  Vitals: Blood Pressure: 110/61 (11/08/22 0553)  Pulse: 61 (11/08/22 0553)  Temperature: (!) 97 4 °F (36 3 °C) (11/08/22 0553)  Temp Source: Temporal (11/08/22 0553)  Respirations: 16 (11/08/22 0553)  Height: 5' 5" (165 1 cm) (11/07/22 1824)  Weight - Scale: 62 9 kg (138 lb 11 2 oz) (11/07/22 1824)  SpO2: 98 % (11/08/22 0553)  Exam:   Physical Exam  Constitutional:       Appearance: He is not ill-appearing or diaphoretic  Cardiovascular:      Rate and Rhythm: Normal rate and regular rhythm  Pulmonary:      Breath sounds: Normal breath sounds  Skin:     Findings: No wound  Neurological:      Mental Status: He is alert and oriented to person, place, and time  Motor: No tremor  Psychiatric:         Mood and Affect: Mood is anxious  Discussion with Family:  Discussed with patient     Discharge instructions/Information to patient and family:   See after visit summary for information provided to patient and family  Provisions for Follow-Up Care:  See after visit summary for information related to follow-up care and any pertinent home health orders  Disposition:     Home    For Discharges to [facility] SNF:   · Not Applicable to this Patient - Not Applicable to this Patient    Planned Readmission: none     Discharge Statement:  I spent 35 minutes discharging the patient  This time was spent on the day of discharge   I had direct contact with the patient on the day of discharge  Greater than 50% of the total time was spent examining patient, answering all patient questions, arranging and discussing plan of care with patient as well as directly providing post-discharge instructions  Additional time then spent on discharge activities  Discharge Medications:  See after visit summary for reconciled discharge medications provided to patient and family        ** Please Note: This note has been constructed using a voice recognition system **

## 2022-11-08 NOTE — ASSESSMENT & PLAN NOTE
Recent Labs     11/08/22  0520   AST 58   ALT 59*   · Elevated in the setting of chronic opioid use with history of IVDA, history of hepatitis C    · Rapid HIV negative   · Continue to monitor  · Encourage Cessation - recommend outpatient follow up upon discharge

## 2022-11-09 ENCOUNTER — HOSPITAL ENCOUNTER (EMERGENCY)
Facility: HOSPITAL | Age: 27
Discharge: HOME | End: 2022-11-10
Attending: STUDENT IN AN ORGANIZED HEALTH CARE EDUCATION/TRAINING PROGRAM

## 2022-11-09 VITALS
RESPIRATION RATE: 16 BRPM | OXYGEN SATURATION: 97 % | BODY MASS INDEX: 23.74 KG/M2 | HEART RATE: 102 BPM | WEIGHT: 142.64 LBS | SYSTOLIC BLOOD PRESSURE: 162 MMHG | DIASTOLIC BLOOD PRESSURE: 94 MMHG | TEMPERATURE: 98.3 F

## 2022-11-09 DIAGNOSIS — F19.10 SUBSTANCE ABUSE (HCC): Primary | ICD-10-CM

## 2022-11-10 LAB
AMPHETAMINES SERPL QL SCN: NEGATIVE
BARBITURATES UR QL: NEGATIVE
BENZODIAZ UR QL: NEGATIVE
COCAINE UR QL: POSITIVE
ETHANOL EXG-MCNC: 0 MG/DL
FLUAV RNA RESP QL NAA+PROBE: NEGATIVE
FLUBV RNA RESP QL NAA+PROBE: NEGATIVE
METHADONE UR QL: NEGATIVE
OPIATES UR QL SCN: POSITIVE
OXYCODONE+OXYMORPHONE UR QL SCN: NEGATIVE
PCP UR QL: NEGATIVE
RSV RNA RESP QL NAA+PROBE: NEGATIVE
SARS-COV-2 RNA RESP QL NAA+PROBE: NEGATIVE
THC UR QL: POSITIVE

## 2022-11-10 NOTE — ED NOTES
Pt stated to this nurse that he wants his clothes so he can leave  Pt unwilling to elaborate why he wants to leave  Provider        Gaudencio Robison RN  11/10/22 3368

## 2022-11-10 NOTE — ED PROVIDER NOTES
History  Chief Complaint   Patient presents with   • Detox Evaluation     Patient seeking detox evaluation for heroin and fentanyl use  Reports 7-8 bag of heroin a day  Patient presents for a detox evaluation  Patient uses 6-7 bags of IV heroin and fentanyl daily  Last use was 4 hours prior to arrival  Patient was just admitted to detox facility yesterday and left after refusing further treatment  Now stating he would like detox again  Admits to occasional cocaine use as well but denies other drugs  Denies any SI/ HI  Denies other physical complaints  None       History reviewed  No pertinent past medical history  History reviewed  No pertinent surgical history  History reviewed  No pertinent family history  I have reviewed and agree with the history as documented  E-Cigarette/Vaping   • E-Cigarette Use Never User      E-Cigarette/Vaping Substances   • Nicotine No    • THC No    • CBD No    • Flavoring No    • Other No    • Unknown No      Social History     Tobacco Use   • Smoking status: Current Every Day Smoker     Packs/day: 0 50   • Smokeless tobacco: Never Used   Vaping Use   • Vaping Use: Never used   Substance Use Topics   • Alcohol use: Not Currently   • Drug use: Yes     Types: Marijuana, Cocaine, Heroin, Fentanyl       Review of Systems   Constitutional: Negative for chills and fever  HENT: Negative for congestion, ear pain and sore throat  Eyes: Negative for pain  Respiratory: Negative for cough and shortness of breath  Cardiovascular: Negative for chest pain  Gastrointestinal: Negative for abdominal pain, nausea and vomiting  Genitourinary: Negative for dysuria  Musculoskeletal: Negative for back pain  Skin: Negative for rash  Neurological: Negative for dizziness, weakness and numbness  Psychiatric/Behavioral: Negative for suicidal ideas  The patient is not nervous/anxious  All other systems reviewed and are negative        Physical Exam  Physical Exam  Vitals reviewed  Constitutional:       General: He is not in acute distress  Appearance: He is well-developed  He is not diaphoretic  HENT:      Head: Normocephalic and atraumatic  Right Ear: External ear normal       Left Ear: External ear normal       Nose: Nose normal       Mouth/Throat:      Mouth: Mucous membranes are moist       Pharynx: Oropharynx is clear  Eyes:      Pupils: Pupils are equal, round, and reactive to light  Cardiovascular:      Rate and Rhythm: Normal rate and regular rhythm  Heart sounds: Normal heart sounds  Pulmonary:      Effort: Pulmonary effort is normal       Breath sounds: Normal breath sounds  Abdominal:      General: Bowel sounds are normal       Palpations: Abdomen is soft  Tenderness: There is no abdominal tenderness  Musculoskeletal:         General: Normal range of motion  Cervical back: Normal range of motion and neck supple  Skin:     General: Skin is warm and dry  Neurological:      Mental Status: He is alert and oriented to person, place, and time  Psychiatric:         Attention and Perception: Attention and perception normal          Mood and Affect: Mood and affect normal          Speech: Speech normal          Behavior: Behavior normal  Behavior is cooperative  Thought Content: Thought content does not include homicidal or suicidal ideation  Thought content does not include homicidal or suicidal plan           Judgment: Judgment normal          Vital Signs  ED Triage Vitals   Temperature Pulse Respirations Blood Pressure SpO2   11/09/22 2339 11/09/22 2340 11/09/22 2339 11/09/22 2339 11/09/22 2339   98 3 °F (36 8 °C) 102 16 162/94 97 %      Temp Source Heart Rate Source Patient Position - Orthostatic VS BP Location FiO2 (%)   11/09/22 2339 11/09/22 2339 11/09/22 2339 11/09/22 2339 --   Oral Monitor Sitting Left arm       Pain Score       11/09/22 2339       8           Vitals:    11/09/22 2339 11/09/22 2340   BP: 162/94    Pulse:  102   Patient Position - Orthostatic VS: Sitting          Visual Acuity      ED Medications  Medications - No data to display    Diagnostic Studies  Results Reviewed     Procedure Component Value Units Date/Time    Rapid drug screen, urine [[id number]]  (Abnormal) Collected: 11/10/22 0123    Lab Status: Final result Specimen: Urine, Other Updated: 11/10/22 0205     Amph/Meth UR Negative     Barbiturate Ur Negative     Benzodiazepine Urine Negative     Cocaine Urine Positive     Methadone Urine Negative     Opiate Urine Positive     PCP Ur Negative     THC Urine Positive     Oxycodone Urine Negative    Narrative:      Presumptive report  If requested, specimen will be sent to reference lab for confirmation  FOR MEDICAL PURPOSES ONLY  IF CONFIRMATION NEEDED PLEASE CONTACT THE LAB WITHIN 5 DAYS  Drug Screen Cutoff Levels:  AMPHETAMINE/METHAMPHETAMINES  1000 ng/mL  BARBITURATES     200 ng/mL  BENZODIAZEPINES     200 ng/mL  COCAINE      300 ng/mL  METHADONE      300 ng/mL  OPIATES      300 ng/mL  PHENCYCLIDINE     25 ng/mL  THC       50 ng/mL  OXYCODONE      100 ng/mL    FLU/RSV/COVID - if FLU/RSV clinically relevant [[id number]]  (Normal) Collected: 11/10/22 0001    Lab Status: Final result Specimen: Nares from Nasopharyngeal Swab Updated: 11/10/22 0045     SARS-CoV-2 Negative     INFLUENZA A PCR Negative     INFLUENZA B PCR Negative     RSV PCR Negative    Narrative:      FOR PEDIATRIC PATIENTS - copy/paste COVID Guidelines URL to browser: [email or web address]  [employer]    SARS-CoV-2 assay is a Nucleic Acid Amplification assay intended for the  qualitative detection of nucleic acid from SARS-CoV-2 in nasopharyngeal  swabs  Results are for the presumptive identification of SARS-CoV-2 RNA      Positive results are indicative of infection with SARS-CoV-2, the virus  causing COVID-19, but do not rule out bacterial infection or co-infection  with other viruses  Laboratories within the [place] and its  territories are required to report all positive results to the appropriate  public health authorities  Negative results do not preclude SARS-CoV-2  infection and should not be used as the sole basis for treatment or other  patient management decisions  Negative results must be combined with  clinical observations, patient history, and epidemiological information  This test has not been FDA cleared or approved  This test has been authorized by FDA under an Emergency Use Authorization  (EUA)  This test is only authorized for the duration of time the  declaration that circumstances exist justifying the authorization of the  emergency use of an in vitro diagnostic tests for detection of SARS-CoV-2  virus and/or diagnosis of COVID-19 infection under section 564(b)(1) of  the Act, 21 U  S C  112KMO-6(G)(9), unless the authorization is terminated  or revoked sooner  The test has been validated but independent review by FDA  and CLIA is pending  Test performed using [employer] GeneXpert: This RT-PCR assay targets N2,  a region unique to SARS-CoV-2  A conserved region in the E-gene was chosen  for pan-Sarbecovirus detection which includes SARS-CoV-2  According to CMS-2020-01-R, this platform meets the definition of high-throughput technology  POCT alcohol breath test [[id number]]  (Normal) Resulted: 11/10/22 0001    Lab Status: Final result Updated: 11/10/22 0001     EXTBreath Alcohol 0 000                 No orders to display              Procedures  Procedures         ED Course  ED Course as of 11/10/22 0726   Thu Nov 10, 2022   0701 Signed out to day team pending [facility] evaluation                                             MDM    Disposition  Final diagnoses:   None     ED Disposition     None      Follow-up Information    None         Patient's Medications    No medications on file       No discharge procedures on file      PDMP Review     None          ED Provider  Electronically Signed by           Migue Rivas PA-C  11/10/22 8936

## 2022-11-10 NOTE — ED NOTES
Assumed care for pt  Pt currently sleeping on owusu bed with no s/s of distress observed        Deb Roberts RN  11/10/22 4800

## 2022-11-10 NOTE — ED NOTES
Pt provided with urine specimen cup  Pt states unable to void at this time  Pt provided with sandwich, chips and water       Lea Gallagher  11/10/22 0017

## 2022-11-10 NOTE — ED NOTES
Attempted to do pt vitals before discharge   Pt refused "No no "     Rickey Champagne  11/10/22 7129

## 2022-11-12 ENCOUNTER — HOSPITAL ENCOUNTER (EMERGENCY)
Facility: HOSPITAL | Age: 27
Discharge: HOME | End: 2022-11-12
Attending: EMERGENCY MEDICINE | Admitting: EMERGENCY MEDICINE

## 2022-11-12 ENCOUNTER — HOSPITAL ENCOUNTER (EMERGENCY)
Facility: HOSPITAL | Age: 27
Discharge: HOME | End: 2022-11-12
Attending: EMERGENCY MEDICINE

## 2022-11-12 VITALS
SYSTOLIC BLOOD PRESSURE: 110 MMHG | BODY MASS INDEX: 23.33 KG/M2 | DIASTOLIC BLOOD PRESSURE: 76 MMHG | WEIGHT: 140.21 LBS | HEART RATE: 78 BPM | OXYGEN SATURATION: 100 % | RESPIRATION RATE: 18 BRPM | TEMPERATURE: 97.2 F

## 2022-11-12 VITALS
WEIGHT: 142 LBS | OXYGEN SATURATION: 96 % | TEMPERATURE: 97.5 F | HEART RATE: 110 BPM | SYSTOLIC BLOOD PRESSURE: 139 MMHG | DIASTOLIC BLOOD PRESSURE: 77 MMHG | BODY MASS INDEX: 23.63 KG/M2 | RESPIRATION RATE: 20 BRPM

## 2022-11-12 DIAGNOSIS — F19.10 DRUG ABUSE (HCC): Primary | ICD-10-CM

## 2022-11-12 LAB
ETHANOL EXG-MCNC: 0 MG/DL
SARS-COV-2 RNA RESP QL NAA+PROBE: NEGATIVE

## 2022-11-12 NOTE — ED NOTES
Patient given a urine cup and made aware we needed a urine sample       Zoë Bowman RN  11/12/22 8408

## 2022-11-12 NOTE — ED NOTES
Fax referral to [facility]    Patient is currently sleeping, Elvin Gillespie from [facility] will call back in 1 hr

## 2022-11-12 NOTE — ED PROVIDER NOTES
History  Chief Complaint   Patient presents with   • Detox Evaluation     Wants detox for cocaine, heroin and fentanyl  Last use midnight  32-year-old gentleman presents requesting detox  He reports that he is using cocaine, heroin, and fentanyl  His last use was around midnight  Patient was just discharged from detox recently and was here for host evaluation but left prior to completion of this evaluation  He denies any acute withdrawal symptoms at this point time denies any other drug or alcohol use recently  He denies any acute psychological issues or medical concerns  Detox Evaluation  Similar prior episodes: yes    Severity:  Severe  Onset quality:  Gradual  Timing:  Constant  Progression:  Waxing and waning  Chronicity:  Recurrent  Suspected agents:  Heroin and cocaine  Associated symptoms: no abdominal pain, no agitation, no blackouts, no bladder incontinence, no bowel incontinence, no confusion, no hallucinations, no headaches, no loss of consciousness, no nausea, no palpitations, no seizures, no shortness of breath, no somnolence, no suicidal ideation, no violence, no vomiting and no weakness        None       History reviewed  No pertinent past medical history  History reviewed  No pertinent surgical history  History reviewed  No pertinent family history  I have reviewed and agree with the history as documented  E-Cigarette/Vaping   • E-Cigarette Use Never User      E-Cigarette/Vaping Substances   • Nicotine No    • THC No    • CBD No    • Flavoring No    • Other No    • Unknown No      Social History     Tobacco Use   • Smoking status: Current Every Day Smoker     Packs/day: 0 50   • Smokeless tobacco: Never Used   Vaping Use   • Vaping Use: Never used   Substance Use Topics   • Alcohol use: Not Currently   • Drug use: Yes     Types: Marijuana, Cocaine, Heroin, Fentanyl       Review of Systems   Respiratory: Negative for shortness of breath      Cardiovascular: Negative for palpitations  Gastrointestinal: Negative for abdominal pain, bowel incontinence, nausea and vomiting  Genitourinary: Negative for bladder incontinence  Neurological: Negative for seizures, loss of consciousness, weakness and headaches  Psychiatric/Behavioral: Negative for agitation, confusion, hallucinations and suicidal ideas  All other systems reviewed and are negative  Physical Exam  Physical Exam  Vitals and nursing note reviewed  Constitutional:       General: He is not in acute distress  Appearance: Normal appearance  He is well-developed  He is not ill-appearing, toxic-appearing or diaphoretic  HENT:      Head: Normocephalic and atraumatic  Right Ear: External ear normal       Left Ear: External ear normal       Nose: Nose normal       Mouth/Throat:      Mouth: Mucous membranes are moist       Pharynx: Oropharynx is clear  Eyes:      Conjunctiva/sclera: Conjunctivae normal       Pupils: Pupils are equal, round, and reactive to light  Cardiovascular:      Rate and Rhythm: Normal rate and regular rhythm  Heart sounds: Normal heart sounds  Pulmonary:      Effort: Pulmonary effort is normal  No respiratory distress  Breath sounds: Normal breath sounds  Abdominal:      General: Bowel sounds are normal  There is no distension  Palpations: Abdomen is soft  Tenderness: There is no abdominal tenderness  There is no guarding  Musculoskeletal:         General: Normal range of motion  Cervical back: Neck supple  No rigidity  Right lower leg: No edema  Left lower leg: No edema  Skin:     General: Skin is warm and dry  Capillary Refill: Capillary refill takes less than 2 seconds  Comments: Multiple injection sites across bilateral arms with no signs of acute infection  Neurological:      General: No focal deficit present  Mental Status: He is alert and oriented to person, place, and time     Psychiatric:         Mood and Affect: Mood normal          Behavior: Behavior normal          Vital Signs  ED Triage Vitals [11/12/22 0343]   Temperature Pulse Respirations Blood Pressure SpO2   98 3 °F (36 8 °C) (!) 119 18 132/74 95 %      Temp Source Heart Rate Source Patient Position - Orthostatic VS BP Location FiO2 (%)   Tympanic Monitor Sitting Left arm --      Pain Score       --           Vitals:    11/12/22 0343   BP: 132/74   Pulse: (!) 119   Patient Position - Orthostatic VS: Sitting         Visual Acuity      ED Medications  Medications - No data to display    Diagnostic Studies  Results Reviewed     Procedure Component Value Units Date/Time    COVID only [[id number]]  (Normal) Collected: 11/12/22 0422    Lab Status: Final result Specimen: Nares from Nasopharyngeal Swab Updated: 11/12/22 0501     SARS-CoV-2 Negative    Narrative:      FOR PEDIATRIC PATIENTS - copy/paste COVID Guidelines URL to browser: [email or web address]    SARS-CoV-2 assay is a Nucleic Acid Amplification assay intended for the  qualitative detection of nucleic acid from SARS-CoV-2 in nasopharyngeal  swabs  Results are for the presumptive identification of SARS-CoV-2 RNA  Positive results are indicative of infection with SARS-CoV-2, the virus  causing COVID-19, but do not rule out bacterial infection or co-infection  with other viruses  Laboratories within the [place] and its  territories are required to report all positive results to the appropriate  public health authorities  Negative results do not preclude SARS-CoV-2  infection and should not be used as the sole basis for treatment or other  patient management decisions  Negative results must be combined with  clinical observations, patient history, and epidemiological information  This test has not been FDA cleared or approved  This test has been authorized by FDA under an Emergency Use Authorization  (EUA)   This test is only authorized for the duration of time the  declaration that circumstances exist justifying the authorization of the  emergency use of an in vitro diagnostic tests for detection of SARS-CoV-2  virus and/or diagnosis of COVID-19 infection under section 564(b)(1) of  the Act, 21 U  S C  382QTN-8(U)(1), unless the authorization is terminated  or revoked sooner  The test has been validated but independent review by FDA  and CLIA is pending  Test performed using [employer] GeneXpert: This RT-PCR assay targets N2,  a region unique to SARS-CoV-2  A conserved region in the E-gene was chosen  for pan-Sarbecovirus detection which includes SARS-CoV-2  According to CMS-2020-01-R, this platform meets the definition of high-throughput technology  POCT alcohol breath test [[id number]]  (Normal) Resulted: 11/12/22 0428    Lab Status: Final result Updated: 11/12/22 0429     EXTBreath Alcohol 0 00    Rapid drug screen, urine [[id number]]     Lab Status: No result Specimen: Urine                  No orders to display              Procedures  Procedures         ED Course                               SBIRT 22yo+    Flowsheet Row Most Recent Value   SBIRT (25 yo +)    In order to provide better care to our patients, we are screening all of our patients for alcohol and drug use  Would it be okay to ask you these screening questions? Yes Filed at: 11/12/2022 0426   Initial Alcohol Screen: US AUDIT-C     1  How often do you have a drink containing alcohol? 0 Filed at: 11/12/2022 0426   2  How many drinks containing alcohol do you have on a typical day you are drinking? 0 Filed at: 11/12/2022 0426   3a  Male UNDER 65: How often do you have five or more drinks on one occasion? 0 Filed at: 11/12/2022 0426   3b  FEMALE Any Age, or MALE 65+: How often do you have 4 or more drinks on one occassion? 0 Filed at: 11/12/2022 0426   Audit-C Score 0 Filed at: 11/12/2022 4904   CURTIS: How many times in the past year have you        Used an illegal drug or used a prescription medication for non-medical reasons? Daily or Almost Daily Filed at: 11/12/2022 0426   DAST-10: In the past 12 months       1  Have you used drugs other than those required for medical reasons? 1 Filed at: 11/12/2022 0426   2  Do you use more than one drug at a time? 1 Filed at: 11/12/2022 0426   3  Have you had medical problems as a result of your drug use (e g , memory loss, hepatitis, convulsions, bleeding, etc )? 1 Filed at: 11/12/2022 0426   4  Have you had "blackouts" or "flashbacks" as a result of drug use? YesNo 0 Filed at: 11/12/2022 0426   5  Do you ever feel bad or guilty about your drug use? 1 Filed at: 11/12/2022 0426   6  Does your spouse (or parent) ever complain about your involvement with drugs? 1 Filed at: 11/12/2022 0426   7  Have you neglected your family because of your use of drugs? 0 Filed at: 11/12/2022 0426   8  Have you engaged in illegal activities in order to obtain drugs? 0 Filed at: 11/12/2022 0426   9  Have you ever experienced withdrawal symptoms (felt sick) when you stopped taking drugs? 0 Filed at: 11/12/2022 0426   10  Are you always able to stop using drugs when you want to? 1 Filed at: 11/12/2022 0426   DAST-10 Score 6 Filed at: 11/12/2022 9016                    MDM    Disposition  Final diagnoses:   Drug abuse [facility])     Time reflects when diagnosis was documented in both MDM as applicable and the Disposition within this note     Time User Action Codes Description Comment    11/12/2022  5:50 AM Betina Laguerre Add [F19 10] Drug abuse [facility])       ED Disposition     None      Follow-up Information    None         Patient's Medications    No medications on file       No discharge procedures on file      PDMP Review     None          ED Provider  Electronically Signed by           Reza Chanel DO  11/12/22 0572

## 2022-11-12 NOTE — ED NOTES
Patient drank the liquids given and ate all the food that was given to him       Skip Denson RN  11/12/22 8158

## 2022-11-12 NOTE — ED NOTES
Pt given sandwich,pretzels,chips,p b  crackers and apple juice @ bedside  Tolerating well       Koffi Craig  11/12/22 5023

## 2022-11-12 NOTE — ED PROVIDER NOTES
History  Chief Complaint   Patient presents with   • Detox Evaluation     Want detox from heroin & fentanyl  Last used 6hrs ago  • Psychiatric Evaluation     Pt states "I think bad things and nobody helps me"     HPI  60-year-old gentleman presents requesting detox  Patient reports using cocaine, heroin and fentanyl  Patient reports the last use was around 6 hours ago  Patient was recently discharged from detox and this is his 3rd visit today during 1 visit he left without being seen 2 other visits the he left after host referral   Patient denies any acute withdrawal symptoms at this time  He does report depression but has repeatedly denied SI HI or auditory or visual hallucinations  None       History reviewed  No pertinent past medical history  History reviewed  No pertinent surgical history  History reviewed  No pertinent family history  I have reviewed and agree with the history as documented  E-Cigarette/Vaping   • E-Cigarette Use Never User      E-Cigarette/Vaping Substances   • Nicotine No    • THC No    • CBD No    • Flavoring No    • Other No    • Unknown No      Social History     Tobacco Use   • Smoking status: Current Every Day Smoker     Packs/day: 0 50   • Smokeless tobacco: Never Used   Vaping Use   • Vaping Use: Never used   Substance Use Topics   • Alcohol use: Not Currently   • Drug use: Yes     Types: Marijuana, Cocaine, Heroin, Fentanyl       Review of Systems   Constitutional: Negative for chills and fever  HENT: Negative for congestion, rhinorrhea and sore throat  Eyes: Negative for redness and visual disturbance  Respiratory: Negative for cough and shortness of breath  Cardiovascular: Negative for chest pain and palpitations  Gastrointestinal: Negative for constipation, diarrhea, nausea and vomiting  Genitourinary: Negative for dysuria and hematuria  Musculoskeletal: Negative for myalgias and neck pain  Skin: Negative for rash and wound  Allergic/Immunologic: Negative for immunocompromised state  Neurological: Negative for seizures and syncope  Psychiatric/Behavioral: Positive for behavioral problems and dysphoric mood  Negative for confusion and suicidal ideas  Physical Exam  Physical Exam  Vitals and nursing note reviewed  Constitutional:       General: He is not in acute distress  Appearance: He is not ill-appearing  HENT:      Head: Normocephalic and atraumatic  Right Ear: External ear normal       Left Ear: External ear normal       Nose: Nose normal  No congestion  Mouth/Throat:      Mouth: Mucous membranes are moist    Eyes:      General: No scleral icterus  Conjunctiva/sclera: Conjunctivae normal    Pulmonary:      Effort: Pulmonary effort is normal  No respiratory distress  Musculoskeletal:         General: No signs of injury  Normal range of motion  Cervical back: Normal range of motion  Skin:     General: Skin is dry  Coloration: Skin is not jaundiced  Findings: No rash  Neurological:      Mental Status: He is alert and oriented to person, place, and time  Mental status is at baseline  Psychiatric:         Behavior: Behavior normal          Thought Content:  Thought content normal          Vital Signs  ED Triage Vitals [11/12/22 1509]   Temperature Pulse Respirations Blood Pressure SpO2   97 5 °F (36 4 °C) (!) 110 20 139/77 96 %      Temp src Heart Rate Source Patient Position - Orthostatic VS BP Location FiO2 (%)   -- Monitor Sitting Left arm --      Pain Score       No Pain           Vitals:    11/12/22 1509   BP: 139/77   Pulse: (!) 110   Patient Position - Orthostatic VS: Sitting         Visual Acuity      ED Medications  Medications - No data to display    Diagnostic Studies  Results Reviewed     None                 No orders to display              Procedures  Procedures         ED Course                                             MDM  Patient on arrival ambulatory to room, vital signs stable with slight tachycardia, afebrile  This is the patient's 3rd visit today he is left 2 times after host for falls in place and 1 time after host except med and bed search was ongoing  Patient cannot offer an explanation for why he left  Denies any interim drug use however shows no signs of withdrawal at this time  Behavior does not show commitment to sobriety  Patient was also verbally degrading to staff during intake and while waiting for provider  He denies any SI, HI or auditory visual hallucinations and does not pose an immediate danger to himself or others  Offered to give him resources for outpatient drug abuse rehabilitation programs as well as community dispensed Narcan however patient refused  Patient then left the department  Will discharge in stable condition  Disposition  Final diagnoses:   Drug abuse [facility])     Time reflects when diagnosis was documented in both MDM as applicable and the Disposition within this note     Time User Action Codes Description Comment    11/12/2022  3:26 PM Sapphire Thurman Add [F19 10] Drug abuse [facility])       ED Disposition     ED Disposition   Discharge    Condition   Stable    Date/Time   Sat Nov 12, 2022  3:26 PM    Comment   Maty Franz discharge to home/self care  Follow-up Information     Follow up With Specialties Details Why Contact Info    [facility]  Schedule an appointment as soon as possible for a visit in 2 days for rehabilitation from addictive substance [address] [facility], [address] [place]    [phone]          Patient's Medications    No medications on file       No discharge procedures on file      PDMP Review     None          ED Provider  Electronically Signed by           Brianna Pedraza MD  11/12/22 6040

## 2022-11-13 ENCOUNTER — HOSPITAL ENCOUNTER (EMERGENCY)
Facility: HOSPITAL | Age: 27
Discharge: HOME | End: 2022-11-13
Attending: INTERNAL MEDICINE

## 2022-11-13 VITALS
DIASTOLIC BLOOD PRESSURE: 69 MMHG | RESPIRATION RATE: 16 BRPM | TEMPERATURE: 98.2 F | HEART RATE: 80 BPM | OXYGEN SATURATION: 100 % | WEIGHT: 141.09 LBS | SYSTOLIC BLOOD PRESSURE: 124 MMHG | BODY MASS INDEX: 23.48 KG/M2

## 2022-11-13 DIAGNOSIS — F32.A DEPRESSION: Primary | ICD-10-CM

## 2022-11-13 DIAGNOSIS — Z53.21 ELOPED FROM EMERGENCY DEPARTMENT: ICD-10-CM

## 2022-11-13 LAB
AMPHETAMINES SERPL QL SCN: NEGATIVE
BARBITURATES UR QL: NEGATIVE
BENZODIAZ UR QL: NEGATIVE
COCAINE UR QL: POSITIVE
ETHANOL EXG-MCNC: 0 MG/DL
METHADONE UR QL: NEGATIVE
OPIATES UR QL SCN: NEGATIVE
OXYCODONE+OXYMORPHONE UR QL SCN: NEGATIVE
PCP UR QL: NEGATIVE
THC UR QL: POSITIVE

## 2022-11-13 NOTE — ED NOTES
Pt was found wandering the ED hallway requesting for his clothes to leave   Charge nurse and provider made aware     Braden Phelan RN  11/13/22 8265

## 2022-11-13 NOTE — ED NOTES
Insurance   EVS (Eligibility Verification System) called - [phone]    Automated system indicates: active with [facility] ID # [id number]

## 2022-11-13 NOTE — ED NOTES
Patient given belongings by tech and left department  Provider and crisis aware and ok with patient departure       Vielka Brady RN  11/13/22 3550

## 2022-11-13 NOTE — ED NOTES
Pt was given belongings, got changed, and notified this ED Tech that they will be leaving behind a pair of shorts on the ED bed that they did not want anymore     Michelle Munroe RN  11/13/22 3301

## 2022-11-13 NOTE — ED PROVIDER NOTES
History  Chief Complaint   Patient presents with   • Psychiatric Evaluation     [facility]ia [employer]  "I have an addition and I want to kill myself "  reports " cutting myself "  denies HI  denies [address]     Patient is a 52-year-old male well known to this facility due to frequent visits for request for assistance with addiction  Patient presenting today reporting he is feeling depressed and reports "I want help with my addiction problems "  Patient denies any self harm ideations to this provider, additionally denies suicidality or homicidality  reports no hallucinations at this time wants to sign himself in for "psychological help"  Patient states he has been depressed since his mother passed away and due to being in half-way he was unable to visit her  Patient reports that he uses fentanyl and cocaine recreationally and states his last use was 1 bag of IV heroin around 1-2 a m  This morning reports he uses typically 1 bag intravenously  History provided by:  Patient   used: No    Psychiatric Evaluation  Associated symptoms: no abdominal pain, no chest pain and no fatigue        None       History reviewed  No pertinent past medical history  History reviewed  No pertinent surgical history  History reviewed  No pertinent family history  I have reviewed and agree with the history as documented  E-Cigarette/Vaping   • E-Cigarette Use Never User      E-Cigarette/Vaping Substances   • Nicotine No    • THC No    • CBD No    • Flavoring No    • Other No    • Unknown No      Social History     Tobacco Use   • Smoking status: Current Every Day Smoker     Packs/day: 0 50   • Smokeless tobacco: Never Used   Vaping Use   • Vaping Use: Never used   Substance Use Topics   • Alcohol use: Not Currently   • Drug use: Yes     Types: Marijuana, Cocaine, Heroin, Fentanyl       Review of Systems   Constitutional: Negative for chills, fatigue and fever     HENT: Negative for congestion, ear pain, rhinorrhea and sore throat  Eyes: Negative for redness  Respiratory: Negative for chest tightness and shortness of breath  Cardiovascular: Negative for chest pain and palpitations  Gastrointestinal: Negative for abdominal pain, nausea and vomiting  Genitourinary: Negative for dysuria and hematuria  Musculoskeletal: Negative  Skin: Negative for rash  Neurological: Negative for dizziness, syncope, light-headedness and numbness  Physical Exam  Physical Exam  Vitals and nursing note reviewed  Constitutional:       Appearance: Normal appearance  He is well-developed  HENT:      Head: Normocephalic and atraumatic  Eyes:      General: No scleral icterus  Pupils: Pupils are equal, round, and reactive to light  Cardiovascular:      Rate and Rhythm: Normal rate and regular rhythm  Pulses: Normal pulses  Pulmonary:      Effort: Pulmonary effort is normal  No respiratory distress  Breath sounds: No stridor  Abdominal:      General: There is no distension  Palpations: There is no mass  Musculoskeletal:      Cervical back: Normal range of motion  Skin:     General: Skin is warm and dry  Capillary Refill: Capillary refill takes less than 2 seconds  Coloration: Skin is not jaundiced  Neurological:      Mental Status: He is alert and oriented to person, place, and time  Gait: Gait normal    Psychiatric:         Attention and Perception: Attention and perception normal          Mood and Affect: Mood normal          Behavior: Behavior normal          Thought Content: Thought content normal  Thought content does not include homicidal or suicidal ideation  Thought content does not include homicidal or suicidal plan           Vital Signs  ED Triage Vitals [11/13/22 1238]   Temperature Pulse Respirations Blood Pressure SpO2   98 2 °F (36 8 °C) 80 16 124/69 100 %      Temp Source Heart Rate Source Patient Position - Orthostatic VS BP Location FiO2 (%)   Tympanic Monitor Sitting Left arm --      Pain Score       --           Vitals:    11/13/22 1238   BP: 124/69   Pulse: 80   Patient Position - Orthostatic VS: Sitting         Visual Acuity      ED Medications  Medications - No data to display    Diagnostic Studies  Results Reviewed     Procedure Component Value Units Date/Time    Rapid drug screen, urine [[id number]]  (Abnormal) Collected: 11/13/22 1318    Lab Status: Final result Specimen: Urine, Clean Catch Updated: 11/13/22 1405     Amph/Meth UR Negative     Barbiturate Ur Negative     Benzodiazepine Urine Negative     Cocaine Urine Positive     Methadone Urine Negative     Opiate Urine Negative     PCP Ur Negative     THC Urine Positive     Oxycodone Urine Negative    Narrative:      Presumptive report  If requested, specimen will be sent to reference lab for confirmation  FOR MEDICAL PURPOSES ONLY  IF CONFIRMATION NEEDED PLEASE CONTACT THE LAB WITHIN 5 DAYS  Drug Screen Cutoff Levels:  AMPHETAMINE/METHAMPHETAMINES  1000 ng/mL  BARBITURATES     200 ng/mL  BENZODIAZEPINES     200 ng/mL  COCAINE      300 ng/mL  METHADONE      300 ng/mL  OPIATES      300 ng/mL  PHENCYCLIDINE     25 ng/mL  THC       50 ng/mL  OXYCODONE      100 ng/mL    POCT alcohol breath test [[id number]]  (Normal) Resulted: 11/13/22 1316    Lab Status: Final result Updated: 11/13/22 1316     EXTBreath Alcohol 0 000                 No orders to display              Procedures  Procedures         ED Course  ED Course as of 11/13/22 1555   Sun Nov 13, 2022   1552 Patient is not suicidal nor homicidal and informed nursing staff that he would be leaving  This provider was informed the patient was given his clothing and left the department  SBIRT 20yo+    Flowsheet Row Most Recent Value   SBIRT (23 yo +)    In order to provide better care to our patients, we are screening all of our patients for alcohol and drug use  Would it be okay to ask you these screening questions?  No Filed at: 11/13/2022 8930                    MDM  Number of Diagnoses or Management Options  Depression  Eloped from emergency department  Diagnosis management comments:   Portions of the record may have been created with voice recognition software  Occasional wrong word or "sound a like" substitutions may have occurred due to the inherent limitations of voice recognition software  Read the chart carefully and recognize, using context, where substitutions have occurred  Disposition  Final diagnoses:   Depression   Eloped from emergency department     Time reflects when diagnosis was documented in both MDM as applicable and the Disposition within this note     Time User Action Codes Description Comment    11/13/2022  3:19 PM Michelle Bansal Add Sharmaine Labor  A] Depression     11/13/2022  3:53 PM Michelle Bansal Add [X07 41] Eloped from emergency department       ED Disposition     ED Disposition   Left from Room after Provider Exam    Condition   --    Date/Time   Sun Nov 13, 2022  3:53 PM    Comment   Claudetta Rounds should be transferred out to behavioral health and has been medically cleared  Follow-up Information    None         There are no discharge medications for this patient  No discharge procedures on file      PDMP Review     None          ED Provider  Electronically Signed by           Marcell King PA-C  11/13/22 5627

## 2022-11-28 ENCOUNTER — HOSPITAL ENCOUNTER (INPATIENT)
Facility: HOSPITAL | Age: 27
LOS: 2 days | Discharge: LEFT BEFORE DISCHARGE | End: 2022-11-30
Attending: EMERGENCY MEDICINE | Admitting: STUDENT IN AN ORGANIZED HEALTH CARE EDUCATION/TRAINING PROGRAM

## 2022-11-28 ENCOUNTER — APPOINTMENT (EMERGENCY)
Dept: RADIOLOGY | Facility: HOSPITAL | Age: 27
End: 2022-11-28

## 2022-11-28 ENCOUNTER — APPOINTMENT (INPATIENT)
Dept: CT IMAGING | Facility: HOSPITAL | Age: 27
End: 2022-11-28

## 2022-11-28 DIAGNOSIS — L03.113 CELLULITIS OF RIGHT UPPER EXTREMITY: Primary | ICD-10-CM

## 2022-11-28 DIAGNOSIS — L02.413 ABSCESS OF ARM, RIGHT: ICD-10-CM

## 2022-11-28 LAB
ALBUMIN SERPL BCP-MCNC: 3.7 G/DL (ref 3.5–5)
ALP SERPL-CCNC: 98 U/L (ref 43–122)
ALT SERPL W P-5'-P-CCNC: 60 U/L
ANION GAP SERPL CALCULATED.3IONS-SCNC: 8 MMOL/L (ref 5–14)
AST SERPL W P-5'-P-CCNC: 50 U/L (ref 17–59)
BASOPHILS # BLD AUTO: 0.05 THOUSANDS/ÂΜL (ref 0–0.1)
BASOPHILS NFR BLD AUTO: 0 % (ref 0–1)
BILIRUB SERPL-MCNC: 0.46 MG/DL (ref 0.2–1)
BUN SERPL-MCNC: 16 MG/DL (ref 5–25)
CALCIUM SERPL-MCNC: 9 MG/DL (ref 8.4–10.2)
CHLORIDE SERPL-SCNC: 101 MMOL/L (ref 96–108)
CO2 SERPL-SCNC: 29 MMOL/L (ref 21–32)
CREAT SERPL-MCNC: 0.58 MG/DL (ref 0.7–1.5)
CRP SERPL QL: 138.6 MG/L
EOSINOPHIL # BLD AUTO: 0.02 THOUSAND/ÂΜL (ref 0–0.61)
EOSINOPHIL NFR BLD AUTO: 0 % (ref 0–6)
ERYTHROCYTE [DISTWIDTH] IN BLOOD BY AUTOMATED COUNT: 12.5 % (ref 11.6–15.1)
ERYTHROCYTE [SEDIMENTATION RATE] IN BLOOD: 53 MM/HOUR (ref 0–14)
GFR SERPL CREATININE-BSD FRML MDRD: 140 ML/MIN/1.73SQ M
GLUCOSE SERPL-MCNC: 90 MG/DL (ref 70–99)
HCT VFR BLD AUTO: 42.6 % (ref 36.5–49.3)
HGB BLD-MCNC: 13.8 G/DL (ref 12–17)
IMM GRANULOCYTES # BLD AUTO: 0.08 THOUSAND/UL (ref 0–0.2)
IMM GRANULOCYTES NFR BLD AUTO: 1 % (ref 0–2)
LACTATE SERPL-SCNC: 1.2 MMOL/L (ref 0.5–2)
LYMPHOCYTES # BLD AUTO: 1.11 THOUSANDS/ÂΜL (ref 0.6–4.47)
LYMPHOCYTES NFR BLD AUTO: 7 % (ref 14–44)
MCH RBC QN AUTO: 30.6 PG (ref 26.8–34.3)
MCHC RBC AUTO-ENTMCNC: 32.4 G/DL (ref 31.4–37.4)
MCV RBC AUTO: 95 FL (ref 82–98)
MONOCYTES # BLD AUTO: 0.79 THOUSAND/ÂΜL (ref 0.17–1.22)
MONOCYTES NFR BLD AUTO: 5 % (ref 4–12)
NEUTROPHILS # BLD AUTO: 13.02 THOUSANDS/ÂΜL (ref 1.85–7.62)
NEUTS SEG NFR BLD AUTO: 87 % (ref 43–75)
NRBC BLD AUTO-RTO: 0 /100 WBCS
PLATELET # BLD AUTO: 212 THOUSANDS/UL (ref 149–390)
PMV BLD AUTO: 10.8 FL (ref 8.9–12.7)
POTASSIUM SERPL-SCNC: 4.7 MMOL/L (ref 3.5–5.3)
PROCALCITONIN SERPL-MCNC: 0.38 NG/ML
PROT SERPL-MCNC: 7.9 G/DL (ref 6.4–8.4)
RBC # BLD AUTO: 4.51 MILLION/UL (ref 3.88–5.62)
SODIUM SERPL-SCNC: 138 MMOL/L (ref 135–147)
WBC # BLD AUTO: 15.07 THOUSAND/UL (ref 4.31–10.16)

## 2022-11-28 RX ORDER — ACETAMINOPHEN 325 MG/1
650 TABLET ORAL EVERY 6 HOURS PRN
Status: DISCONTINUED | OUTPATIENT
Start: 2022-11-28 | End: 2022-11-30 | Stop reason: HOSPADM

## 2022-11-28 RX ORDER — KETOROLAC TROMETHAMINE 30 MG/ML
30 INJECTION, SOLUTION INTRAMUSCULAR; INTRAVENOUS EVERY 6 HOURS PRN
Status: DISCONTINUED | OUTPATIENT
Start: 2022-11-28 | End: 2022-11-30 | Stop reason: HOSPADM

## 2022-11-28 RX ADMIN — KETOROLAC TROMETHAMINE 30 MG: 30 INJECTION, SOLUTION INTRAMUSCULAR; INTRAVENOUS at 18:34

## 2022-11-28 RX ADMIN — VANCOMYCIN HYDROCHLORIDE 1250 MG: 1 INJECTION, POWDER, LYOPHILIZED, FOR SOLUTION INTRAVENOUS at 14:37

## 2022-11-28 RX ADMIN — IOHEXOL 100 ML: 350 INJECTION, SOLUTION INTRAVENOUS at 16:00

## 2022-11-28 NOTE — ED PROVIDER NOTES
History  Chief Complaint   Patient presents with   • Abscess     R arm x3-4 days; patient believes there may be a needle in his arm from IV drug use     HPI  Patient is a 32 yo male with PMH of IVDU presenting with R arm infection  Patient reports has had ongoing right arm erythema and edema for the last 5 days over last 2 days has had intermittent fevers primarily in the morning subjective  Reports that he is a current IV drug user primarily with heroin  He also reports taking cocaine occasionally he also reports a tobacco history but denies any EtOH  Patient does use IV drugs and does believe that he might have used a dirty needle or had a needle break off in his arm but is unsure  He denies any cough congestion nausea vomiting diarrhea  Patient is not currently on any antibiotics  Last used IV drugs today is not currently undergoing withdrawal symptoms  No history of immunocompromise  None       History reviewed  No pertinent past medical history  History reviewed  No pertinent surgical history  History reviewed  No pertinent family history  I have reviewed and agree with the history as documented  E-Cigarette/Vaping   • E-Cigarette Use Never User      E-Cigarette/Vaping Substances   • Nicotine No    • THC No    • CBD No    • Flavoring No    • Other No    • Unknown No      Social History     Tobacco Use   • Smoking status: Every Day     Packs/day: 0 50     Types: Cigarettes   • Smokeless tobacco: Never   Vaping Use   • Vaping Use: Never used   Substance Use Topics   • Alcohol use: Not Currently   • Drug use: Yes     Types: Marijuana, Cocaine, Heroin, Fentanyl       Review of Systems   Constitutional: Positive for fever (subjective)  Negative for chills  HENT: Negative for congestion, rhinorrhea and sore throat  Eyes: Negative for redness and visual disturbance  Respiratory: Negative for cough and shortness of breath  Cardiovascular: Negative for chest pain and palpitations  Gastrointestinal: Negative for constipation, diarrhea, nausea and vomiting  Genitourinary: Negative for dysuria and hematuria  Musculoskeletal: Negative for myalgias and neck pain  Skin: Positive for color change and wound  Negative for rash  Allergic/Immunologic: Negative for immunocompromised state  Neurological: Negative for seizures and syncope  Psychiatric/Behavioral: Negative for confusion and suicidal ideas  Physical Exam  Physical Exam  Vitals and nursing note reviewed  Constitutional:       General: He is not in acute distress  Appearance: Normal appearance  He is well-developed  HENT:      Head: Normocephalic and atraumatic  No raccoon eyes  Right Ear: External ear normal       Left Ear: External ear normal       Nose: Nose normal  No congestion  Mouth/Throat:      Lips: Pink  Mouth: Mucous membranes are moist    Eyes:      General: Lids are normal  No scleral icterus  Extraocular Movements: Extraocular movements intact  Cardiovascular:      Rate and Rhythm: Normal rate and regular rhythm  Heart sounds: No murmur heard  No friction rub  Pulmonary:      Effort: Pulmonary effort is normal  No respiratory distress  Breath sounds: No wheezing or rhonchi  Abdominal:      General: Abdomen is flat  Palpations: Abdomen is soft  Tenderness: There is no abdominal tenderness  There is no guarding or rebound  Musculoskeletal:      Cervical back: Normal range of motion  No torticollis  Skin:     General: Skin is warm and dry  Coloration: Skin is not jaundiced  Findings: Erythema present  Comments: Over the volar surface of the forearm there are several well-healed scars  He has 2 areas of more acute appearing wounds punctate in nature  There is significant amount of edema and erythema    The erythema is circumferential around the forearm with streaking on the volar surface to his wrist into his upper humeral area however no significant armpit findings  Please see pictures below  Sensation intact, radial pulse 2+ bilateral    Neurological:      Mental Status: He is alert and oriented to person, place, and time  Mental status is at baseline  Psychiatric:         Behavior: Behavior normal  Behavior is cooperative               Vital Signs  ED Triage Vitals [11/28/22 1248]   Temperature Pulse Respirations Blood Pressure SpO2   98 °F (36 7 °C) 86 18 117/67 98 %      Temp Source Heart Rate Source Patient Position - Orthostatic VS BP Location FiO2 (%)   Oral Monitor Sitting Left arm --      Pain Score       --           Vitals:    11/28/22 1248   BP: 117/67   Pulse: 86   Patient Position - Orthostatic VS: Sitting         Visual Acuity      ED Medications  Medications   vancomycin (VANCOCIN) 1,250 mg in sodium chloride 0 9 % 250 mL IVPB (1,250 mg Intravenous New Bag 11/28/22 1437)   vancomycin (VANCOCIN) 1,250 mg in sodium chloride 0 9 % 250 mL IVPB (has no administration in time range)   acetaminophen (TYLENOL) tablet 650 mg (has no administration in time range)   ketorolac (TORADOL) injection 30 mg (has no administration in time range)       Diagnostic Studies  Results Reviewed     Procedure Component Value Units Date/Time    Procalcitonin [[id number]]     Lab Status: No result Specimen: Blood     C-reactive protein [[id number]]  (Abnormal) Collected: 11/28/22 1405    Lab Status: Final result Specimen: Blood from Arm, Left Updated: 11/28/22 1446      6 mg/L     MRSA culture [[id number]]     Lab Status: No result Specimen: [facility]     Comprehensive metabolic panel [[id number]]  (Abnormal) Collected: 11/28/22 1405    Lab Status: Final result Specimen: Blood from Arm, Left Updated: 11/28/22 1431     Sodium 138 mmol/L      Potassium 4 7 mmol/L      Chloride 101 mmol/L      CO2 29 mmol/L      ANION GAP 8 mmol/L      BUN 16 mg/dL      Creatinine 0 58 mg/dL      Glucose 90 mg/dL      Calcium 9 0 mg/dL      AST 50 U/L      ALT 60 U/L      Alkaline Phosphatase 98 U/L      Total Protein 7 9 g/dL      Albumin 3 7 g/dL      Total Bilirubin 0 46 mg/dL      eGFR 140 ml/min/1 73sq m     Narrative:      [place] guidelines for Chronic Kidney Disease (CKD):   •  Stage 1 with normal or high GFR (GFR > 90 mL/min/1 73 square meters)  •  Stage 2 Mild CKD (GFR = 60-89 mL/min/1 73 square meters)  •  Stage 3A Moderate CKD (GFR = 45-59 mL/min/1 73 square meters)  •  Stage 3B Moderate CKD (GFR = 30-44 mL/min/1 73 square meters)  •  Stage 4 Severe CKD (GFR = 15-29 mL/min/1 73 square meters)  •  Stage 5 End Stage CKD (GFR <15 mL/min/1 73 square meters)  Note: GFR calculation is accurate only with a steady state creatinine    Sedimentation rate, automated [[id number]]  (Abnormal) Collected: 11/28/22 1405    Lab Status: Final result Specimen: Blood from Arm, Left Updated: 11/28/22 1431     Sed Rate 53 mm/hour     Lactic acid, plasma [[id number]]  (Normal) Collected: 11/28/22 1405    Lab Status: Final result Specimen: Blood from Arm, Left Updated: 11/28/22 1428     LACTIC ACID 1 2 mmol/L     Narrative:      Result may be elevated if tourniquet was used during collection      CBC and differential [[id number]]  (Abnormal) Collected: 11/28/22 1405    Lab Status: Final result Specimen: Blood from Arm, Left Updated: 11/28/22 1416     WBC 15 07 Thousand/uL      RBC 4 51 Million/uL      Hemoglobin 13 8 g/dL      Hematocrit 42 6 %      MCV 95 fL      MCH 30 6 pg      MCHC 32 4 g/dL      RDW 12 5 %      MPV 10 8 fL      Platelets 785 Thousands/uL      nRBC 0 /100 WBCs      Neutrophils Relative 87 %      Immat GRANS % 1 %      Lymphocytes Relative 7 %      Monocytes Relative 5 %      Eosinophils Relative 0 %      Basophils Relative 0 %      Neutrophils Absolute 13 02 Thousands/µL      Immature Grans Absolute 0 08 Thousand/uL      Lymphocytes Absolute 1 11 Thousands/µL      Monocytes Absolute 0 79 Thousand/µL      Eosinophils Absolute 0 02 Thousand/µL      Basophils Absolute 0 05 Thousands/µL     Blood culture #2 [[id number]] Collected: 11/28/22 1405    Lab Status: In process Specimen: Blood from Arm, Left Updated: 11/28/22 1409    Blood culture #1 [[id number]] Collected: 11/28/22 1405    Lab Status: In process Specimen: Blood from Arm, Left Updated: 11/28/22 1409                 XR forearm 2 views RIGHT   Final Result by Hilary Amaro MD (11/28 1439)   No radiopaque foreign body      No acute osseous abnormality  Workstation performed: [id number]                    Procedures  POC MSK/Soft Tissue US    Date/Time: 11/28/2022 3:11 PM  Performed by: Sami Oliveros MD  Authorized by: Sami Oliveros MD     Patient location:  ED  Performed by: Attending  Procedure:     Performed: soft tissue ultrasound    Procedure details:     Exam Type:  Diagnostic    Longitudinal view:  Obtained    Transverse view:  Obtained    Image quality: diagnostic      Image availability:  Not saved  Soft tissue ultrasound:     Soft tissue indications: swelling, erythema and suspected abscess      Anatomic location:  Upper extremity    Soft tissue findings: cobblestoning      Soft tissue findings comment:  No obvious foreign body  Interpretation:     Soft tissue impressions: consistent with cellulitis    Comments:      Significant cobblestoning extending from the volar surface of the forearm circumferentially around the forearm to the wrist and cobblestoning on the medial portion of his upper extremity above elbow             ED Course  ED Course as of 11/28/22 1502   Mon Nov 28, 2022   1433 Sed Rate(!): 53   1433 WBC(!): 15 07   1433 Hemoglobin: 13 8   1433 Neutrophils %(!): 87   1433 Sodium: 138   1433 Potassium: 4 7   1433 Chloride: 101   1433 Creatinine(!): 0 58   1433 LACTIC ACID: 1 2        XR without gaseous abnormalities no significant foreign bodies  Significantly increased WBC with neutrophilic predominance increased ESR    Patient discussed with internal medicine will be admitted for further management  SBIRT 22yo+    Flowsheet Row Most Recent Value   SBIRT (23 yo +)    In order to provide better care to our patients, we are screening all of our patients for alcohol and drug use  Would it be okay to ask you these screening questions? No Filed at: 11/28/2022 1332                    [facility]  Patient on arrival is ambulatory to room is in no acute distress, vital signs stable, afebrile  On exam lungs clear auscultation, heart without murmurs rubs or gallops abdomen soft nontender  Will obtain labs and ESR, CRP will begin IV vancomycin and obtain Xr to assess for foreign body  Disposition  Final diagnoses:   Cellulitis of right upper extremity     Time reflects when diagnosis was documented in both MDM as applicable and the Disposition within this note     Time User Action Codes Description Comment    11/28/2022  2:45 PM Kimber Thurman Add [[id number]] Cellulitis of right upper extremity       ED Disposition     ED Disposition   Admit    Condition   Stable    Date/Time   Mon Nov 28, 2022 5803    Comment   Case was discussed with Bere Pérez and the patient's admission status was agreed to be Admission Status: inpatient status to the service of Dr Bere Pérez   Follow-up Information    None         Patient's Medications    No medications on file       No discharge procedures on file      PDMP Review     None          ED Provider  Electronically Signed by           Kana Guevara MD  11/28/22 0121

## 2022-11-28 NOTE — PLAN OF CARE
Problem: Potential for Falls  Goal: Patient will remain free of falls  Description: INTERVENTIONS:  - Educate patient/family on patient safety including physical limitations  - Instruct patient to call for assistance with activity   - Consult OT/PT to assist with strengthening/mobility   - Keep Call bell within reach  - Keep bed low and locked with side rails adjusted as appropriate  - Keep care items and personal belongings within reach  - Initiate and maintain comfort rounds  - Make Fall Risk Sign visible to staff  - Apply yellow socks and bracelet for high fall risk patients  - Consider moving patient to room near nurses station  Outcome: Progressing     Problem: PAIN - ADULT  Goal: Verbalizes/displays adequate comfort level or baseline comfort level  Description: Interventions:  - Encourage patient to monitor pain and request assistance  - Assess pain using appropriate pain scale  - Administer analgesics based on type and severity of pain and evaluate response  - Implement non-pharmacological measures as appropriate and evaluate response  - Consider cultural and social influences on pain and pain management  - Notify physician/advanced practitioner if interventions unsuccessful or patient reports new pain  Outcome: Progressing     Problem: INFECTION - ADULT  Goal: Absence or prevention of progression during hospitalization  Description: INTERVENTIONS:  - Assess and monitor for signs and symptoms of infection  - Monitor lab/diagnostic results  - Monitor all insertion sites, i e  indwelling lines, tubes, and drains  - Administer medications as ordered  - Instruct and encourage patient and family to use good hand hygiene technique  - Identify and instruct in appropriate isolation precautions for identified infection/condition  Outcome: Progressing  Goal: Absence of fever/infection during neutropenic period  Description: INTERVENTIONS:  - Monitor WBC    Outcome: Progressing     Problem: SAFETY ADULT  Goal: Patient will remain free of falls  Description: INTERVENTIONS:  - Educate patient/family on patient safety including physical limitations  - Instruct patient to call for assistance with activity   - Consult OT/PT to assist with strengthening/mobility   - Keep Call bell within reach  - Keep bed low and locked with side rails adjusted as appropriate  - Keep care items and personal belongings within reach  - Initiate and maintain comfort rounds  - Make Fall Risk Sign visible to staff  - Apply yellow socks and bracelet for high fall risk patients  - Consider moving patient to room near nurses station  Outcome: Progressing  Goal: Maintain or return to baseline ADL function  Description: INTERVENTIONS:  -  Assess patient's ability to carry out ADLs; assess patient's baseline for ADL function and identify physical deficits which impact ability to perform ADLs (bathing, care of mouth/teeth, toileting, grooming, dressing, etc )  - Assess/evaluate cause of self-care deficits   - Assess range of motion  - Assess patient's mobility; develop plan if impaired  - Assess patient's need for assistive devices and provide as appropriate  - Encourage maximum independence but intervene and supervise when necessary  - Involve family in performance of ADLs  - Assess for home care needs following discharge   - Consider OT consult to assist with ADL evaluation and planning for discharge  - Provide patient education as appropriate  Outcome: Progressing  Goal: Maintains/Returns to pre admission functional level  Description: INTERVENTIONS:  - Perform BMAT or MOVE assessment daily    - Set and communicate daily mobility goal to care team and patient/family/caregiver     - Collaborate with rehabilitation services on mobility goals if consulted  - Ambulate patient 3 times a day  - Out of bed to chair 2 times a day   - Out of bed for meals 3 times a day  - Out of bed for toileting  - Record patient progress and toleration of activity level   Outcome: Progressing     Problem: DISCHARGE PLANNING  Goal: Discharge to home or other facility with appropriate resources  Description: INTERVENTIONS:  - Identify barriers to discharge w/patient and caregiver  - Arrange for needed discharge resources and transportation as appropriate  - Identify discharge learning needs (meds, wound care, etc )  - Arrange for interpretive services to assist at discharge as needed  - Refer to Case Management Department for coordinating discharge planning if the patient needs post-hospital services based on physician/advanced practitioner order or complex needs related to functional status, cognitive ability, or social support system  Outcome: Progressing     Problem: Knowledge Deficit  Goal: Patient/family/caregiver demonstrates understanding of disease process, treatment plan, medications, and discharge instructions  Description: Complete learning assessment and assess knowledge base    Interventions:  - Provide teaching at level of understanding  - Provide teaching via preferred learning methods  Outcome: Progressing     Problem: SKIN/TISSUE INTEGRITY - ADULT  Goal: Incision(s), wounds(s) or drain site(s) healing without S/S of infection  Description: INTERVENTIONS  - Assess and document dressing, incision, wound bed, drain sites and surrounding tissue  - Provide patient and family education  Outcome: Progressing

## 2022-11-28 NOTE — H&P
[facility]  H&P- Julia Coffman 1995, 33 y o  male MRN: [id number]  Unit/Bed#: A6C7 Encounter: [id number]  Primary Care Provider: No primary care provider on file  Date and time admitted to hospital: 11/28/2022 12:39 PM    * Cellulitis of right arm  Assessment & Plan  Patient with history of IV drug use  Presenting with 5 days of right arm erythema and swelling  X-ray right arm negative for foreign body, osseous abnormality or soft tissue abnormality  Started on vancomycin by ED  ESR and CRP elevated    -continue vancomycin  -serial exams  -monitor blood cultures  -trend CBC  -check CT upper extremity rule abscess    Polysubstance abuse [facility])  Assessment & Plan  Patient with history of polysubstance abuse    -monitor for signs of withdrawal    Elevated LFTs  Assessment & Plan  Chronic    -continue to monitor    VTE Pharmacologic Prophylaxis: VTE Score: 1 Low Risk (Score 0-2) - Encourage Ambulation  Code Status:  Level 1 full code  Discussion with family: Patient declined call to   Anticipated Length of Stay: Patient will be admitted on an inpatient basis with an anticipated length of stay of greater than 2 midnights secondary to Right arm cellulitis requiring administration of IV antibiotics  Total Time for Visit, including Counseling / Coordination of Care: 45 minutes Greater than 50% of this total time spent on direct patient counseling and coordination of care  Chief Complaint:  Right arm pain    History of Present Illness:  Julia Coffman is a 33 y o  male with a PMH of IV drug use who presents with right arm pain  Patient presents with 5 days worsening right arm redness and pain  Reports IV drug use  States he said fevers at home, no other symptoms  Review of Systems:  Review of Systems   Constitutional: Negative  HENT: Negative  Respiratory: Negative  Cardiovascular: Negative      Gastrointestinal: Negative  Endocrine: Negative  Genitourinary: Negative  Musculoskeletal: Negative  Skin: Positive for color change  Allergic/Immunologic: Negative  Neurological: Negative  Hematological: Negative  Psychiatric/Behavioral: Negative  Past Medical and Surgical History:   History reviewed  No pertinent past medical history  History reviewed  No pertinent surgical history  Meds/Allergies:  Prior to Admission medications    Not on File     I have reviewed home medications with patient personally  Allergies: No Known Allergies    Social History:  Marital Status: Single   Patient Pre-hospital Living Situation: Home  Patient Pre-hospital Level of Mobility: walks  Patient Pre-hospital Diet Restrictions: none  Substance Use History:   Social History     Substance and Sexual Activity   Alcohol Use Not Currently     Social History     Tobacco Use   Smoking Status Every Day   • Packs/day: 0 50   • Types: Cigarettes   Smokeless Tobacco Never     Social History     Substance and Sexual Activity   Drug Use Yes   • Types: Marijuana, Cocaine, Heroin, Fentanyl       Family History:  History reviewed  No pertinent family history  Physical Exam:     Vitals:   Blood Pressure: 117/67 (11/28/22 1248)  Pulse: 86 (11/28/22 1248)  Temperature: 98 °F (36 7 °C) (11/28/22 1248)  Temp Source: Oral (11/28/22 1248)  Respirations: 18 (11/28/22 1248)  Weight - Scale: 58 2 kg (128 lb 4 9 oz) (11/28/22 1248)  SpO2: 98 % (11/28/22 1248)    Physical Exam  HENT:      Head: Normocephalic  Cardiovascular:      Rate and Rhythm: Normal rate and regular rhythm  Pulses: Normal pulses  Heart sounds: Normal heart sounds  Pulmonary:      Effort: Pulmonary effort is normal       Breath sounds: Normal breath sounds  Abdominal:      General: Abdomen is flat  Bowel sounds are normal       Palpations: Abdomen is soft  Musculoskeletal:         General: Swelling present  Skin:     Findings: Erythema present  Comments: Erythema and edema right arm   Neurological:      General: No focal deficit present  Mental Status: He is alert  Mental status is at baseline  Psychiatric:         Mood and Affect: Mood normal          Thought Content: Thought content normal          Judgment: Judgment normal           Additional Data:     Lab Results:  Results from last 7 days   Lab Units 11/28/22  1405   WBC Thousand/uL 15 07*   HEMOGLOBIN g/dL 13 8   HEMATOCRIT % 42 6   PLATELETS Thousands/uL 212   NEUTROS PCT % 87*   LYMPHS PCT % 7*   MONOS PCT % 5   EOS PCT % 0     Results from last 7 days   Lab Units 11/28/22  1405   SODIUM mmol/L 138   POTASSIUM mmol/L 4 7   CHLORIDE mmol/L 101   CO2 mmol/L 29   BUN mg/dL 16   CREATININE mg/dL 0 58*   ANION GAP mmol/L 8   CALCIUM mg/dL 9 0   ALBUMIN g/dL 3 7   TOTAL BILIRUBIN mg/dL 0 46   ALK PHOS U/L 98   ALT U/L 60*   AST U/L 50   GLUCOSE RANDOM mg/dL 90                 Results from last 7 days   Lab Units 11/28/22  1405   LACTIC ACID mmol/L 1 2       Lines/Drains:  Invasive Devices     Peripheral Intravenous Line  Duration           Peripheral IV 11/28/22 Left Forearm <1 day                    Imaging: Reviewed radiology reports from this admission including: xray(s)  XR forearm 2 views RIGHT   Final Result by Larry Garcia MD (11/28 8749)   No radiopaque foreign body      No acute osseous abnormality  Workstation performed: [id number]             EKG and Other Studies Reviewed on Admission:   · EKG: No EKG obtained  ** Please Note: This note has been constructed using a voice recognition system   **

## 2022-11-28 NOTE — PROGRESS NOTES
Cecil Winkler is a 33 y o  male who is currently ordered Vancomycin IV with management by the Pharmacy Consult service  Relevant clinical data and objective / subjective history reviewed  Vancomycin Assessment:  Indication and Goal AUC/Trough: Soft tissue (goal -600, trough >10), -600, trough >10  Clinical Status: new  Micro:     Renal Function:  SCr: 0 58 mg/dL  CrCl: 167 6 mL/min  Days of Therapy: 1  6  Current Dose: 1250 mg IV every 12 hours  Vancomycin Plan:  New Dosin mg IV every 12 hours  2  Estimated AUC: 464 mcg*hr/mL  3  Estimated Trough: 12 mcg/mL  4  Next Level:  random level 22 14:00  5  Renal Function Monitoring: Daily BMP   Pharmacy will continue to follow closely for s/sx of nephrotoxicity, infusion reactions and appropriateness of therapy  BMP and CBC will be ordered per protocol  We will continue to follow the patient’s culture results and clinical progress daily      Ankur Aceves, Pharmacist

## 2022-11-28 NOTE — ASSESSMENT & PLAN NOTE
Patient with history of IV drug use  Presenting with 5 days of right arm erythema and swelling  X-ray right arm negative for foreign body, osseous abnormality or soft tissue abnormality  Started on vancomycin by ED  ESR and CRP elevated    -continue vancomycin  -serial exams  -monitor blood cultures  -trend CBC  -check CT upper extremity rule abscess  -Tylenol and Toradol for pain control

## 2022-11-29 PROBLEM — L02.413 ABSCESS OF ARM, RIGHT: Status: ACTIVE | Noted: 2022-11-28

## 2022-11-29 LAB
ALBUMIN SERPL BCP-MCNC: 3.1 G/DL (ref 3.5–5)
ALP SERPL-CCNC: 92 U/L (ref 43–122)
ALT SERPL W P-5'-P-CCNC: 46 U/L
ANION GAP SERPL CALCULATED.3IONS-SCNC: 7 MMOL/L (ref 5–14)
AST SERPL W P-5'-P-CCNC: 49 U/L (ref 17–59)
BILIRUB SERPL-MCNC: 0.12 MG/DL (ref 0.2–1)
BUN SERPL-MCNC: 21 MG/DL (ref 5–25)
CALCIUM ALBUM COR SERPL-MCNC: 9 MG/DL (ref 8.3–10.1)
CALCIUM SERPL-MCNC: 8.3 MG/DL (ref 8.4–10.2)
CHLORIDE SERPL-SCNC: 97 MMOL/L (ref 96–108)
CO2 SERPL-SCNC: 32 MMOL/L (ref 21–32)
CREAT SERPL-MCNC: 0.75 MG/DL (ref 0.7–1.5)
ERYTHROCYTE [DISTWIDTH] IN BLOOD BY AUTOMATED COUNT: 12.5 % (ref 11.6–15.1)
GFR SERPL CREATININE-BSD FRML MDRD: 126 ML/MIN/1.73SQ M
GLUCOSE SERPL-MCNC: 102 MG/DL (ref 70–99)
HCT VFR BLD AUTO: 38.3 % (ref 36.5–49.3)
HGB BLD-MCNC: 12.6 G/DL (ref 12–17)
MAGNESIUM SERPL-MCNC: 1.9 MG/DL (ref 1.6–2.3)
MCH RBC QN AUTO: 31 PG (ref 26.8–34.3)
MCHC RBC AUTO-ENTMCNC: 32.9 G/DL (ref 31.4–37.4)
MCV RBC AUTO: 94 FL (ref 82–98)
MRSA NOSE QL CULT: NORMAL
PLATELET # BLD AUTO: 185 THOUSANDS/UL (ref 149–390)
PMV BLD AUTO: 12.1 FL (ref 8.9–12.7)
POTASSIUM SERPL-SCNC: 3.7 MMOL/L (ref 3.5–5.3)
PROT SERPL-MCNC: 6.6 G/DL (ref 6.4–8.4)
RBC # BLD AUTO: 4.07 MILLION/UL (ref 3.88–5.62)
SODIUM SERPL-SCNC: 136 MMOL/L (ref 135–147)
WBC # BLD AUTO: 13.21 THOUSAND/UL (ref 4.31–10.16)

## 2022-11-29 PROCEDURE — 0K970ZZ DRAINAGE OF RIGHT UPPER ARM MUSCLE, OPEN APPROACH: ICD-10-PCS | Performed by: SURGERY

## 2022-11-29 RX ORDER — LIDOCAINE HYDROCHLORIDE 10 MG/ML
10 INJECTION, SOLUTION EPIDURAL; INFILTRATION; INTRACAUDAL; PERINEURAL ONCE
Status: DISCONTINUED | OUTPATIENT
Start: 2022-11-29 | End: 2022-11-30 | Stop reason: HOSPADM

## 2022-11-29 RX ORDER — LANOLIN ALCOHOL/MO/W.PET/CERES
3 CREAM (GRAM) TOPICAL
Status: DISCONTINUED | OUTPATIENT
Start: 2022-11-29 | End: 2022-11-30 | Stop reason: HOSPADM

## 2022-11-29 RX ORDER — OXYCODONE HYDROCHLORIDE 5 MG/1
5 TABLET ORAL EVERY 4 HOURS PRN
Status: DISCONTINUED | OUTPATIENT
Start: 2022-11-29 | End: 2022-11-30 | Stop reason: HOSPADM

## 2022-11-29 RX ORDER — HYDROMORPHONE HCL/PF 1 MG/ML
0.5 SYRINGE (ML) INJECTION ONCE
Status: COMPLETED | OUTPATIENT
Start: 2022-11-29 | End: 2022-11-29

## 2022-11-29 RX ORDER — CLONIDINE HYDROCHLORIDE 0.1 MG/1
0.1 TABLET ORAL EVERY 8 HOURS SCHEDULED
Status: DISCONTINUED | OUTPATIENT
Start: 2022-11-29 | End: 2022-11-30 | Stop reason: HOSPADM

## 2022-11-29 RX ORDER — CYCLOBENZAPRINE HCL 10 MG
10 TABLET ORAL 3 TIMES DAILY
Status: DISCONTINUED | OUTPATIENT
Start: 2022-11-29 | End: 2022-11-30 | Stop reason: HOSPADM

## 2022-11-29 RX ORDER — GABAPENTIN 300 MG/1
300 CAPSULE ORAL 3 TIMES DAILY
Status: DISCONTINUED | OUTPATIENT
Start: 2022-11-29 | End: 2022-11-30 | Stop reason: HOSPADM

## 2022-11-29 RX ADMIN — CYCLOBENZAPRINE HYDROCHLORIDE 10 MG: 10 TABLET, FILM COATED ORAL at 10:59

## 2022-11-29 RX ADMIN — OXYCODONE HYDROCHLORIDE 5 MG: 5 TABLET ORAL at 20:35

## 2022-11-29 RX ADMIN — VANCOMYCIN HYDROCHLORIDE 1250 MG: 1 INJECTION, POWDER, LYOPHILIZED, FOR SOLUTION INTRAVENOUS at 16:12

## 2022-11-29 RX ADMIN — OXYCODONE HYDROCHLORIDE 5 MG: 5 TABLET ORAL at 16:17

## 2022-11-29 RX ADMIN — CYCLOBENZAPRINE HYDROCHLORIDE 10 MG: 10 TABLET, FILM COATED ORAL at 20:36

## 2022-11-29 RX ADMIN — MELATONIN TAB 3 MG 3 MG: 3 TAB at 03:42

## 2022-11-29 RX ADMIN — CYCLOBENZAPRINE HYDROCHLORIDE 10 MG: 10 TABLET, FILM COATED ORAL at 16:12

## 2022-11-29 RX ADMIN — KETOROLAC TROMETHAMINE 30 MG: 30 INJECTION, SOLUTION INTRAMUSCULAR; INTRAVENOUS at 03:27

## 2022-11-29 RX ADMIN — CLONIDINE HYDROCHLORIDE 0.1 MG: 0.1 TABLET ORAL at 21:18

## 2022-11-29 RX ADMIN — GABAPENTIN 300 MG: 300 CAPSULE ORAL at 16:12

## 2022-11-29 RX ADMIN — HYDROMORPHONE HYDROCHLORIDE 0.5 MG: 1 INJECTION, SOLUTION INTRAMUSCULAR; INTRAVENOUS; SUBCUTANEOUS at 14:19

## 2022-11-29 RX ADMIN — CLONIDINE HYDROCHLORIDE 0.1 MG: 0.1 TABLET ORAL at 16:12

## 2022-11-29 RX ADMIN — GABAPENTIN 300 MG: 300 CAPSULE ORAL at 10:59

## 2022-11-29 RX ADMIN — VANCOMYCIN HYDROCHLORIDE 1250 MG: 1 INJECTION, POWDER, LYOPHILIZED, FOR SOLUTION INTRAVENOUS at 03:27

## 2022-11-29 RX ADMIN — GABAPENTIN 300 MG: 300 CAPSULE ORAL at 20:35

## 2022-11-29 RX ADMIN — MELATONIN TAB 3 MG 3 MG: 3 TAB at 21:18

## 2022-11-29 NOTE — CONSULTS
Consultation - General Surgery   Bobo Franz 33 y o  male MRN: [id number]  Unit/Bed#: 7T [facility] 707-01 Encounter: [id number]    Assessment/Plan     Assessment:  Right arm abscess  Plan: Will perform bedside incision and drainage, there is an intramuscular component which if unable to be drained at bedside will need to be drained in the operating room  Verbal consent was obtained  Will send cultures  P r n  Pain medication  Maintain dressing for now  History of Present Illness     HPI:  Bobo Franz is a 33 y o  male who presents with approximately 1 week of worsening right arm pain and erythema  He has a history of polysubstance abuse and IV drug use which is the etiology of this pain  He had a CT scan in the emergency room that showed a fluid collection within the volar aspect of the proximal forearm abutting the flexor muscle compartment with a more superficial component  He had a leukocytosis of 15  He was started on IV antibiotics  Consults    Review of Systems   Skin: Positive for color change  All other systems reviewed and are negative  Historical Information   History reviewed  No pertinent past medical history  History reviewed  No pertinent surgical history    Social History   Social History     Substance and Sexual Activity   Alcohol Use Not Currently     Social History     Substance and Sexual Activity   Drug Use Yes   • Types: Marijuana, Cocaine, Heroin, Fentanyl     E-Cigarette/Vaping   • E-Cigarette Use Never User      E-Cigarette/Vaping Substances   • Nicotine No    • THC No    • CBD No    • Flavoring No    • Other No    • Unknown No      Social History     Tobacco Use   Smoking Status Every Day   • Packs/day: 0 50   • Types: Cigarettes   Smokeless Tobacco Never     Family History: non-contributory    Meds/Allergies   all current active meds have been reviewed  No Known Allergies    Objective   First Vitals:   Blood Pressure: 117/67 (11/28/22 1248)  Pulse: 86 (11/28/22 1248)  Temperature: 98 °F (36 7 °C) (11/28/22 1248)  Temp Source: Oral (11/28/22 1248)  Respirations: 18 (11/28/22 1248)  Height: 5' 5" (165 1 cm) (11/28/22 1645)  Weight - Scale: 58 2 kg (128 lb 4 9 oz) (11/28/22 1248)  SpO2: 98 % (11/28/22 1248)    Current Vitals:   Blood Pressure: 101/50 (11/29/22 0720)  Pulse: 62 (11/29/22 0720)  Temperature: (!) 96 8 °F (36 °C) (11/29/22 0720)  Temp Source: Temporal (11/29/22 0720)  Respirations: 18 (11/29/22 0720)  Height: 5' 5" (165 1 cm) (11/28/22 1645)  Weight - Scale: 61 2 kg (134 lb 14 7 oz) (11/28/22 1645)  SpO2: 98 % (11/29/22 0720)      Intake/Output Summary (Last 24 hours) at 11/29/2022 1434  Last data filed at 11/29/2022 1300  Gross per 24 hour   Intake 1210 ml   Output --   Net 1210 ml       Invasive Devices     Peripheral Intravenous Line  Duration           Peripheral IV 11/28/22 Left Forearm 1 day                Physical Exam  Vitals reviewed  Constitutional:       General: He is not in acute distress  Appearance: Normal appearance  He is normal weight  HENT:      Head: Normocephalic and atraumatic  Nose: Nose normal       Mouth/Throat:      Mouth: Mucous membranes are moist       Pharynx: Oropharynx is clear  Eyes:      Extraocular Movements: Extraocular movements intact  Conjunctiva/sclera: Conjunctivae normal       Pupils: Pupils are equal, round, and reactive to light  Cardiovascular:      Rate and Rhythm: Normal rate and regular rhythm  Heart sounds: Normal heart sounds  Pulmonary:      Effort: Pulmonary effort is normal  No respiratory distress  Breath sounds: Normal breath sounds  Abdominal:      General: Abdomen is flat  There is no distension  Palpations: Abdomen is soft  Tenderness: There is no abdominal tenderness  Musculoskeletal:         General: Swelling and tenderness present  Normal range of motion  Right forearm: Swelling and tenderness present        Cervical back: Normal range of motion and neck supple  Comments: Right volar forearm erythema, induration, tenderness and fluctuance there are prior transversely oriented incisions that it are well healed   Skin:     General: Skin is warm and dry  Neurological:      General: No focal deficit present  Mental Status: He is alert and oriented to person, place, and time  Lab Results:   I have personally reviewed pertinent lab results  , CBC:   Lab Results   Component Value Date    WBC 13 21 (H) 11/29/2022    HGB 12 6 11/29/2022    HCT 38 3 11/29/2022    MCV 94 11/29/2022     11/29/2022    MCH 31 0 11/29/2022    MCHC 32 9 11/29/2022    RDW 12 5 11/29/2022    MPV 12 1 11/29/2022   , CMP:   Lab Results   Component Value Date    SODIUM 136 11/29/2022    K 3 7 11/29/2022    CL 97 11/29/2022    CO2 32 11/29/2022    BUN 21 11/29/2022    CREATININE 0 75 11/29/2022    CALCIUM 8 3 (L) 11/29/2022    AST 49 11/29/2022    ALT 46 11/29/2022    ALKPHOS 92 11/29/2022    EGFR 126 11/29/2022     Imaging: I have personally reviewed pertinent reports  and I have personally reviewed pertinent films in PACS  EKG, Pathology, and Other Studies: I have personally reviewed pertinent reports  Counseling / Coordination of Care  Total floor / unit time spent today 25 minutes  Greater than 50% of total time was spent with the patient and / or family counseling and / or coordination of care    A description of the counseling / coordination of care: bedside drainage

## 2022-11-29 NOTE — UTILIZATION REVIEW
NOTIFICATION OF INPATIENT ADMISSION   AUTHORIZATION REQUEST   SERVICING FACILITY:   [address] [place] [address]  Susan Samano  , [facility], [address] [place] [address]  Tax ID: [id number]  NPI: [id number] ATTENDING PROVIDER:  Attending Name and NPI#: Maricel Pimentel [[id number]]  Address: Susan Samano [address] [place] [address], [address] [place] [address]  Phone: [phone]     ADMISSION INFORMATION:  Place of Service: Inpatient [address] [facility]  [address]  Place of Service Code: 21  Inpatient Admission Date/Time: 11/28/22  2:47 PM  Discharge Date/Time: No discharge date for patient encounter  Admitting Diagnosis Code/Description:  Abscess [L02 91]  Cellulitis of right upper extremity [A82 953]     UTILIZATION REVIEW CONTACT:  Burgess Santiago Utilization   Network Utilization Review Department  Phone: [phone]  Fax [phone]  Email: [email or web address]  Contact for approvals/pending authorizations, clinical reviews, and discharge  PHYSICIAN ADVISORY SERVICES:  Medical Necessity Denial & Cswp-pf-Cptl Review  Phone: [phone]  Fax: [phone]  Email: [email or web address]

## 2022-11-29 NOTE — ASSESSMENT & PLAN NOTE
Patient with history of IV drug use  Presenting with 5 days of right arm erythema and swelling  X-ray right arm negative for foreign body, osseous abnormality or soft tissue abnormality  Started on vancomycin by ED  ESR and CRP elevated  CT imaging revealed abscess in volar compartment    -continue vancomycin  -serial exams  -monitor blood cultures  -trend CBC  -Tylenol and Toradol for pain control  -general surgery consulted, plan for OR with I&D later today

## 2022-11-29 NOTE — PROGRESS NOTES
Incision and drain    Date/Time: 11/29/2022 2:39 PM  Performed by: Lucio Avila MD  Authorized by: Lucio Avila MD   Universal Protocol:  Consent: Verbal consent obtained  Risks and benefits: risks, benefits and alternatives were discussed  Consent given by: patient  Time out: Immediately prior to procedure a "time out" was called to verify the correct patient, procedure, equipment, support staff and site/side marked as required  Timeout called at: 11/29/2022 2:39 PM   Patient identity confirmed: verbally with patient      Patient location:  Bedside  Location:     Type:  Abscess    Location:  Upper extremity    Upper extremity location:  R arm  Pre-procedure details:     Skin preparation:  Chloraprep  Anesthesia (see MAR for exact dosages): Anesthesia method:  Local infiltration    Local anesthetic:  Lidocaine 1% w/o epi  Procedure details:     Complexity:  Complex    Needle aspiration: no      Incision types:  Single straight    Scalpel blade:  11    Approach:  Open    Incision depth:  Muscle    Wound management:  Probed and deloculated and irrigated with saline    Drainage:  Bloody and purulent    Drainage amount: Moderate    Wound treatment:  Wound left open    Packing materials:  None  Post-procedure details:     Patient tolerance of procedure: Tolerated with difficulty  Comments:      Superficial component of the abscess was drained and cultures were taken  Attempted to access the abscess component abutting the muscles but unable to tolerate due to pain  Wound was thoroughly irrigated and pressure held  There was some oozing present at procedure and but dressing was applied and secured with modified pressure  Will plan on proceeding to the operating room tomorrow for completion and wound washout debridement and drainage

## 2022-11-29 NOTE — PROGRESS NOTES
[facility]  Progress Note Reginaldo Snow 1995, 33 y o  male MRN: [id number]  Unit/Bed#: 7T [facility] 707-01 Encounter: [id number]  Primary Care Provider: No primary care provider on file  Date and time admitted to hospital: 11/28/2022 12:39 PM    * Abscess of arm, right  Assessment & Plan  Patient with history of IV drug use  Presenting with 5 days of right arm erythema and swelling  X-ray right arm negative for foreign body, osseous abnormality or soft tissue abnormality  Started on vancomycin by ED  ESR and CRP elevated  CT imaging revealed abscess in volar compartment    -continue vancomycin today will likely transition to p o  Tomorrow and discharge   -serial exams  -monitor blood cultures  -trend CBC  -Tylenol and Toradol for pain control  -general surgery consulted, patient will likely need I&D    Polysubstance abuse ([facility] [address] )  Assessment & Plan  Patient with history of polysubstance abuse    -monitor for signs of withdrawal  -will schedule clonidine, Flexeril and gabapentin withdrawal symptoms    Elevated LFTs  Assessment & Plan  Chronic  Improved    -continue to monitor      VTE Pharmacologic Prophylaxis: VTE Score: 1 Low Risk (Score 0-2) - Encourage Ambulation  Patient Centered Rounds: I performed bedside rounds with nursing staff today  Discussions with Specialists or Other Care Team Provider:  General surgery    Education and Discussions with Family / Patient:  Discussed with patient all questions answered    Time Spent for Care: 45 minutes  More than 50% of total time spent on counseling and coordination of care as described above      Current Length of Stay: 1 day(s)  Current Patient Status: Inpatient   Certification Statement: The patient will continue to require additional inpatient hospital stay due to Abscess requiring I&D and IV antibiotics  Discharge Plan:  Anticipate discharge tomorrow    Code Status:  Level 1 full code    Subjective:   Patient seen and examined at bedside  Patient reports improvement of swelling and redness of right arm  States his pain was well today  Objective:     Vitals:   Temp (24hrs), Av 5 °F (36 4 °C), Min:96 8 °F (36 °C), Max:98 °F (36 7 °C)    Temp:  [96 8 °F (36 °C)-98 °F (36 7 °C)] 96 8 °F (36 °C)  HR:  [62-86] 62  Resp:  [18] 18  BP: (101-126)/(50-75) 101/50  SpO2:  [98 %-100 %] 98 %  Body mass index is 22 45 kg/m²  Input and Output Summary (last 24 hours): Intake/Output Summary (Last 24 hours) at 2022 1028  Last data filed at 2022  Gross per 24 hour   Intake 730 ml   Output --   Net 730 ml       Physical Exam:   Physical Exam  HENT:      Head: Normocephalic  Cardiovascular:      Rate and Rhythm: Normal rate and regular rhythm  Pulses: Normal pulses  Heart sounds: Normal heart sounds  Pulmonary:      Effort: Pulmonary effort is normal       Breath sounds: Normal breath sounds  Abdominal:      General: Abdomen is flat  Bowel sounds are normal       Palpations: Abdomen is soft  Musculoskeletal:         General: Swelling present  Skin:     Comments: Erythema right arm (improved)  Subcutaneous fluid collection right forearm   Neurological:      General: No focal deficit present  Mental Status: He is alert and oriented to person, place, and time  Mental status is at baseline  Psychiatric:         Mood and Affect: Mood normal          Behavior: Behavior normal          Thought Content:  Thought content normal          Judgment: Judgment normal           Additional Data:     Labs:  Results from last 7 days   Lab Units 22  0440 22  1405   WBC Thousand/uL 13 21* 15 07*   HEMOGLOBIN g/dL 12 6 13 8   HEMATOCRIT % 38 3 42 6   PLATELETS Thousands/uL 185 212   NEUTROS PCT %  --  87*   LYMPHS PCT %  --  7*   MONOS PCT %  --  5   EOS PCT %  --  0     Results from last 7 days   Lab Units 22  0440   SODIUM mmol/L 136   POTASSIUM mmol/L 3 7   CHLORIDE mmol/L 97   CO2 mmol/L 32   BUN mg/dL 21   CREATININE mg/dL 0 75   ANION GAP mmol/L 7   CALCIUM mg/dL 8 3*   ALBUMIN g/dL 3 1*   TOTAL BILIRUBIN mg/dL 0 12*   ALK PHOS U/L 92   ALT U/L 46   AST U/L 49   GLUCOSE RANDOM mg/dL 102*                 Results from last 7 days   Lab Units 11/28/22  1405   LACTIC ACID mmol/L 1 2   PROCALCITONIN ng/ml 0 38*       Lines/Drains:  Invasive Devices     Peripheral Intravenous Line  Duration           Peripheral IV 11/28/22 Left Forearm <1 day                      Imaging: No pertinent imaging reviewed  Recent Cultures (last 7 days):   Results from last 7 days   Lab Units 11/28/22  1405   BLOOD CULTURE  Received in Microbiology Lab  Culture in Progress  Received in Microbiology Lab  Culture in Progress  Last 24 Hours Medication List:   Current Facility-Administered Medications   Medication Dose Route Frequency Provider Last Rate   • acetaminophen  650 mg Oral Q6H PRN Nicky Beard DO     • cloNIDine  0 1 mg Oral [facility] Nicky Beard DO     • cyclobenzaprine  10 mg Oral TID Nicky Beard DO     • gabapentin  300 mg Oral TID Nicky Beard DO     • ketorolac  30 mg Intravenous Q6H PRN Nicky Beard DO     • melatonin  3 mg Oral HS Sherri Duarte PA-C     • vancomycin  20 mg/kg Intravenous Q12H Nicky Beard DO 1,250 mg (11/29/22 0327)        Today, Patient Was Seen By: Nicky Beard DO    **Please Note: This note may have been constructed using a voice recognition system  **

## 2022-11-29 NOTE — ASSESSMENT & PLAN NOTE
Patient with history of polysubstance abuse    -monitor for signs of withdrawal  -will schedule clonidine, Flexeril and gabapentin withdrawal symptoms

## 2022-11-29 NOTE — ASSESSMENT & PLAN NOTE
Patient with history of IV drug use  Presenting with 5 days of right arm erythema and swelling  X-ray right arm negative for foreign body, osseous abnormality or soft tissue abnormality  Started on vancomycin by ED  ESR and CRP elevated  CT imaging revealed abscess in volar compartment    -continue vancomycin today will likely transition to p o   Tomorrow and discharge   -serial exams  -monitor blood cultures  -trend CBC  -Tylenol and Toradol for pain control  -general surgery consulted, patient will likely need I&D

## 2022-11-29 NOTE — UTILIZATION REVIEW
Initial Clinical Review    Admission: Date/Time/Statement:   Admission Orders (From admission, onward)     Ordered        11/28/22 1447  [facility] [place]  Once                      Orders Placed This Encounter   Procedures   • INPATIENT ADMISSION     Standing Status:   Standing     Number of Occurrences:   1     Order Specific Question:   Level of Care     Answer:   Med Surg [16]     Order Specific Question:   Estimated length of stay     Answer:   More than 2 Midnights     Order Specific Question:   Certification     Answer:   I certify that inpatient services are medically necessary for this patient for a duration of greater than two midnights  See H&P and MD Progress Notes for additional information about the patient's course of treatment  ED Arrival Information     Expected   -    Arrival   11/28/2022 11:36    Acuity   Urgent            Means of arrival   Walk-In    Escorted by   Self    Service   Hospitalist    Admission type   Emergency            Arrival complaint   arm pain            Chief Complaint   Patient presents with   • Abscess     R arm x3-4 days; patient believes there may be a needle in his arm from IV drug use       Initial Presentation: 33 y o  male who presented self from home to [address] [place], [place] [address] [facility]  Inpatient admission for evaluation and treatment of RUE cellulitis  PMHx: IV substance use  Presented w/ RUE pain for 5 days, notes fever at home  On exam, RUE erythema and swelling  WBC 15  07  XR negative for foreign body in RUE  Plan: IV ABX, follow blood cultures, Trend labs, replete electrolytes as needed, monitor for substance withdrawal, RUE CT to r/o abscess  Date: 11/29/22   Day 2: Reports improvement in swelling and redness of RUE from presentation  On exam, RUE erythema, subcutaneous fluid collection in R forearm  CT showed abscess/fluid collection in proximal forearm   Plan: IV ABX, monitor blood cultures, analgesics, start clonidine/flexeril/gabapentin for withdrawal symptoms, Trend labs, replete electrolytes as needed  General Surgery consulted  Surgery Consult: Performed bedside I&D, send cultures  Maintain dressing overnight  Unable to access abscess abutting muscles, without success  To OR for completion of I&D w/ washout debridement and drainage tomorrow  ED Triage Vitals   Temperature Pulse Respirations Blood Pressure SpO2   11/28/22 1248 11/28/22 1248 11/28/22 1248 11/28/22 1248 11/28/22 1248   98 °F (36 7 °C) 86 18 117/67 98 %      Temp Source Heart Rate Source Patient Position - Orthostatic VS BP Location FiO2 (%)   11/28/22 1248 11/28/22 1248 11/28/22 1248 11/28/22 1248 --   Oral Monitor Sitting Left arm       Pain Score       11/28/22 1652       8          Wt Readings from Last 1 Encounters:   11/28/22 61 2 kg (134 lb 14 7 oz)     Additional Vital Signs:   Date/Time Temp Pulse Resp BP MAP (mmHg) SpO2 O2 Device   11/29/22 0720 96 8 °F (36 °C) Abnormal  62 18 101/50 -- 98 % None (Room air)   11/28/22 2306 97 2 °F (36 2 °C) Abnormal  72 18 123/65 -- 98 % None (Room air)   11/28/22 1645 97 8 °F (36 6 °C) 82 18 126/75 83 100 % None (Room air)   11/28/22 1607 -- 80 18 121/73 -- 100 % None (Room air)     Pertinent Labs/Diagnostic Test Results:   CT upper extremity w contrast right   Final Result by Brianna Jarrett MD (11/28 1649)   A 5 x 4 2 x 2 4 cm the abscess/fluid collection at the volar aspect of the proximal forearm abutting the volar compartment musculature   Mild edema in the superficial aspect of the flexor musculature in the proximal forearm   Superficial cellulitis          I personally discussed this study with Carl Harvey on 11/28/2022 at 4:49 PM                Workstation performed: [id number]         XR forearm 2 views RIGHT   Final Result by Moon Rsoas MD (11/28 2499)   No radiopaque foreign body      No acute osseous abnormality              Workstation performed: [id number]               Results from last 7 days   Lab Units 11/29/22  0440 11/28/22  1405   WBC Thousand/uL 13 21* 15 07*   HEMOGLOBIN g/dL 12 6 13 8   HEMATOCRIT % 38 3 42 6   PLATELETS Thousands/uL 185 212   NEUTROS ABS Thousands/µL  --  13 02*     Results from last 7 days   Lab Units 11/29/22  0440 11/28/22  1405   SODIUM mmol/L 136 138   POTASSIUM mmol/L 3 7 4 7   CHLORIDE mmol/L 97 101   CO2 mmol/L 32 29   ANION GAP mmol/L 7 8   BUN mg/dL 21 16   CREATININE mg/dL 0 75 0 58*   EGFR ml/min/1 73sq m 126 140   CALCIUM mg/dL 8 3* 9 0   MAGNESIUM mg/dL 1 9  --      Results from last 7 days   Lab Units 11/29/22  0440 11/28/22  1405   AST U/L 49 50   ALT U/L 46 60*   ALK PHOS U/L 92 98   TOTAL PROTEIN g/dL 6 6 7 9   ALBUMIN g/dL 3 1* 3 7   TOTAL BILIRUBIN mg/dL 0 12* 0 46         Results from last 7 days   Lab Units 11/29/22  0440 11/28/22  1405   GLUCOSE RANDOM mg/dL 102* 90     Results from last 7 days   Lab Units 11/28/22  1405   PROCALCITONIN ng/ml 0 38*     Results from last 7 days   Lab Units 11/28/22  1405   LACTIC ACID mmol/L 1 2     Results from last 7 days   Lab Units 11/28/22  1405   CRP mg/L 138 6*   SED RATE mm/hour 53*     Results from last 7 days   Lab Units 11/28/22  1405   BLOOD CULTURE  Received in Microbiology Lab  Culture in Progress  Received in Microbiology Lab  Culture in Progress  ED Treatment:   Medication Administration from 11/28/2022 1135 to 11/28/2022 1639       Date/Time Order Dose Route Action     11/28/2022 1437 EST vancomycin (VANCOCIN) 1,250 mg in sodium chloride 0 9 % 250 mL IVPB 1,250 mg Intravenous New Bag     11/28/2022 1600 EST iohexol (OMNIPAQUE) 350 MG/ML injection (SINGLE-DOSE) 100 mL 100 mL Intravenous [place]        History reviewed  No pertinent past medical history  Present on Admission:  • Polysubstance abuse (HCC)  • Elevated LFTs      Admitting Diagnosis: Abscess [L02 91]  Cellulitis of right upper extremity [L03 113]  Age/Sex: 33 y o  male  Admission Orders:  Regular Diet      Scheduled Medications:  cloNIDine, 0 1 mg, Oral, Q8H [address]  cyclobenzaprine, 10 mg, Oral, TID  gabapentin, 300 mg, Oral, TID  melatonin, 3 mg, Oral, HS  vancomycin, 20 mg/kg, Intravenous, Q12H    Continuous IV Infusions:  none    PRN Meds:  acetaminophen, 650 mg, Oral, Q6H PRN  ketorolac, 30 mg, Intravenous, Q6H PRN; 11/28 x1, 11/29 x1        IP CONSULT TO PHARMACY  IP CONSULT TO ACUTE CARE SURGERY    Network Utilization Review Department  ATTENTION: Please call with any questions or concerns to [phone] and carefully listen to the prompts so that you are directed to the right person  All voicemails are confidential   Harish Charles all requests for admission clinical reviews, approved or denied determinations and any other requests to dedicated fax number below belonging to the campus where the patient is receiving treatment   List of dedicated fax numbers for the Facilities:  [address] DENIALS (Administrative/Medical Necessity) [phone]   [address] (Maternity/NICU/Pediatrics) [phone]   [address] [phone]   [facility] 77 [phone]   [address] [address] [address] [place] [address] [facility] 28 [phone]   [facility]7 [facility] [place] [facility] 134 815 [facility] [phone]

## 2022-11-30 ENCOUNTER — HOSPITAL ENCOUNTER (INPATIENT)
Facility: HOSPITAL | Age: 27
LOS: 1 days | Discharge: LEFT BEFORE DISCHARGE | End: 2022-12-01
Attending: INTERNAL MEDICINE | Admitting: STUDENT IN AN ORGANIZED HEALTH CARE EDUCATION/TRAINING PROGRAM

## 2022-11-30 VITALS
WEIGHT: 134.92 LBS | BODY MASS INDEX: 22.48 KG/M2 | SYSTOLIC BLOOD PRESSURE: 122 MMHG | OXYGEN SATURATION: 99 % | HEIGHT: 65 IN | RESPIRATION RATE: 20 BRPM | HEART RATE: 78 BPM | DIASTOLIC BLOOD PRESSURE: 70 MMHG | TEMPERATURE: 96.8 F

## 2022-11-30 DIAGNOSIS — F19.90 IVDU (INTRAVENOUS DRUG USER): ICD-10-CM

## 2022-11-30 DIAGNOSIS — L02.413 ABSCESS OF ARM, RIGHT: ICD-10-CM

## 2022-11-30 DIAGNOSIS — R52 PAIN: ICD-10-CM

## 2022-11-30 DIAGNOSIS — L02.419 ABSCESS OF FOREARM: Primary | ICD-10-CM

## 2022-11-30 LAB
ALBUMIN SERPL BCP-MCNC: 4.2 G/DL (ref 3.5–5)
ALP SERPL-CCNC: 104 U/L (ref 43–122)
ALT SERPL W P-5'-P-CCNC: 48 U/L
AMPHETAMINES SERPL QL SCN: NEGATIVE
ANION GAP SERPL CALCULATED.3IONS-SCNC: 10 MMOL/L (ref 5–14)
AST SERPL W P-5'-P-CCNC: 53 U/L (ref 17–59)
BARBITURATES UR QL: NEGATIVE
BASOPHILS # BLD AUTO: 0.02 THOUSANDS/ÂΜL (ref 0–0.1)
BASOPHILS NFR BLD AUTO: 0 % (ref 0–1)
BENZODIAZ UR QL: NEGATIVE
BILIRUB SERPL-MCNC: 0.7 MG/DL (ref 0.2–1)
BUN SERPL-MCNC: 12 MG/DL (ref 5–25)
CALCIUM SERPL-MCNC: 9.8 MG/DL (ref 8.4–10.2)
CHLORIDE SERPL-SCNC: 104 MMOL/L (ref 96–108)
CO2 SERPL-SCNC: 27 MMOL/L (ref 21–32)
COCAINE UR QL: POSITIVE
CREAT SERPL-MCNC: 0.82 MG/DL (ref 0.7–1.5)
EOSINOPHIL # BLD AUTO: 0.01 THOUSAND/ÂΜL (ref 0–0.61)
EOSINOPHIL NFR BLD AUTO: 0 % (ref 0–6)
ERYTHROCYTE [DISTWIDTH] IN BLOOD BY AUTOMATED COUNT: 12.3 % (ref 11.6–15.1)
GFR SERPL CREATININE-BSD FRML MDRD: 122 ML/MIN/1.73SQ M
GLUCOSE SERPL-MCNC: 100 MG/DL (ref 70–99)
HCT VFR BLD AUTO: 43.6 % (ref 36.5–49.3)
HGB BLD-MCNC: 14.1 G/DL (ref 12–17)
IMM GRANULOCYTES # BLD AUTO: 0.12 THOUSAND/UL (ref 0–0.2)
IMM GRANULOCYTES NFR BLD AUTO: 1 % (ref 0–2)
LYMPHOCYTES # BLD AUTO: 1.51 THOUSANDS/ÂΜL (ref 0.6–4.47)
LYMPHOCYTES NFR BLD AUTO: 9 % (ref 14–44)
MCH RBC QN AUTO: 30.3 PG (ref 26.8–34.3)
MCHC RBC AUTO-ENTMCNC: 32.3 G/DL (ref 31.4–37.4)
MCV RBC AUTO: 94 FL (ref 82–98)
METHADONE UR QL: NEGATIVE
MONOCYTES # BLD AUTO: 0.63 THOUSAND/ÂΜL (ref 0.17–1.22)
MONOCYTES NFR BLD AUTO: 4 % (ref 4–12)
NEUTROPHILS # BLD AUTO: 13.89 THOUSANDS/ÂΜL (ref 1.85–7.62)
NEUTS SEG NFR BLD AUTO: 86 % (ref 43–75)
NRBC BLD AUTO-RTO: 0 /100 WBCS
OPIATES UR QL SCN: NEGATIVE
OXYCODONE+OXYMORPHONE UR QL SCN: POSITIVE
PCP UR QL: NEGATIVE
PLATELET # BLD AUTO: 255 THOUSANDS/UL (ref 149–390)
PMV BLD AUTO: 10.4 FL (ref 8.9–12.7)
POTASSIUM SERPL-SCNC: 4.5 MMOL/L (ref 3.5–5.3)
PROT SERPL-MCNC: 8.9 G/DL (ref 6.4–8.4)
RBC # BLD AUTO: 4.65 MILLION/UL (ref 3.88–5.62)
SODIUM SERPL-SCNC: 141 MMOL/L (ref 135–147)
THC UR QL: POSITIVE
WBC # BLD AUTO: 16.18 THOUSAND/UL (ref 4.31–10.16)

## 2022-11-30 RX ORDER — HYDROMORPHONE HCL/PF 1 MG/ML
0.5 SYRINGE (ML) INJECTION EVERY 4 HOURS PRN
Status: DISCONTINUED | OUTPATIENT
Start: 2022-11-30 | End: 2022-12-01

## 2022-11-30 RX ORDER — OXYCODONE HYDROCHLORIDE 5 MG/1
5 TABLET ORAL EVERY 4 HOURS PRN
Status: DISCONTINUED | OUTPATIENT
Start: 2022-11-30 | End: 2022-12-01 | Stop reason: HOSPADM

## 2022-11-30 RX ORDER — ACETAMINOPHEN 325 MG/1
650 TABLET ORAL EVERY 6 HOURS PRN
Status: DISCONTINUED | OUTPATIENT
Start: 2022-11-30 | End: 2022-12-01

## 2022-11-30 RX ORDER — NICOTINE 21 MG/24HR
1 PATCH, TRANSDERMAL 24 HOURS TRANSDERMAL DAILY
Status: DISCONTINUED | OUTPATIENT
Start: 2022-12-01 | End: 2022-12-01 | Stop reason: HOSPADM

## 2022-11-30 RX ORDER — VANCOMYCIN HYDROCHLORIDE 1 G/200ML
15 INJECTION, SOLUTION INTRAVENOUS EVERY 12 HOURS
Status: DISCONTINUED | OUTPATIENT
Start: 2022-11-30 | End: 2022-12-01 | Stop reason: HOSPADM

## 2022-11-30 RX ADMIN — VANCOMYCIN HYDROCHLORIDE 1000 MG: 1 INJECTION, SOLUTION INTRAVENOUS at 17:54

## 2022-11-30 RX ADMIN — VANCOMYCIN HYDROCHLORIDE 1250 MG: 1 INJECTION, POWDER, LYOPHILIZED, FOR SOLUTION INTRAVENOUS at 05:59

## 2022-11-30 RX ADMIN — CLONIDINE HYDROCHLORIDE 0.1 MG: 0.1 TABLET ORAL at 05:59

## 2022-11-30 RX ADMIN — HYDROMORPHONE HYDROCHLORIDE 0.5 MG: 1 INJECTION, SOLUTION INTRAMUSCULAR; INTRAVENOUS; SUBCUTANEOUS at 16:47

## 2022-11-30 NOTE — H&P
[facility]  H&P- Lv Gómez 1995, 33 y o  male MRN: [id number]  Unit/Bed#: 7T [facility] 709-01 Encounter: [id number]  Primary Care Provider: No primary care provider on file  Date and time admitted to hospital: 11/30/2022 12:18 PM    * Abscess of arm, right  Assessment & Plan  Patient with history IV drug use  Has known abscess in volar component of right forearm  MRSA swab negative    -vancomycin  -plan for OR tomorrow  -NPO at midnight  -Tylenol, oxycodone and Dilaudid for pain control  -general surgery consulted, appreciate recommendations    Polysubstance abuse ([facility] [address] )  Assessment & Plan  UDS positive for oxycodone, THC and cocaine  Received oxycodone during hospitalization      Opioid use disorder, severe, dependence ([facility] [address] )  Assessment & Plan  -will monitor for signs of withdrawal    VTE Pharmacologic Prophylaxis: VTE Score: 1 Low Risk (Score 0-2) - Encourage Ambulation  Code Status: Level 1 - Full Code   Discussion with family: Patient declined call to   Anticipated Length of Stay: Patient will be admitted on an inpatient basis with an anticipated length of stay of greater than 2 midnights secondary to Right arm abscess requiring I and D and IV antibiotics  Total Time for Visit, including Counseling / Coordination of Care: 30 minutes Greater than 50% of this total time spent on direct patient counseling and coordination of care  Chief Complaint:  Right arm abscess    History of Present Illness:  Lv Gómez is a 33 y o  male with a PMH of IV drug use, right arm abscess who presents with right arm abscess  Patient left hospital AMA earlier today, was originally planned for I/D in OR however left promptly before it could be completed  Patient now staying he will stay have I/D done  Review of Systems:  Review of Systems   Constitutional: Positive for fatigue  HENT: Negative  Respiratory: Negative  Gastrointestinal: Negative  Genitourinary: Negative  Skin: Positive for color change and wound  Hematological: Negative  Psychiatric/Behavioral: Negative  Past Medical and Surgical History:   History reviewed  No pertinent past medical history  History reviewed  No pertinent surgical history  Meds/Allergies:  Prior to Admission medications    Not on File     I have reviewed home medications with patient personally  Allergies: No Known Allergies    Social History:  Marital Status: Single   Patient Pre-hospital Living Situation: Home  Patient Pre-hospital Level of Mobility: walks  Patient Pre-hospital Diet Restrictions: none  Substance Use History:   Social History     Substance and Sexual Activity   Alcohol Use Not Currently     Social History     Tobacco Use   Smoking Status Every Day   • Packs/day: 0 50   • Types: Cigarettes   Smokeless Tobacco Never     Social History     Substance and Sexual Activity   Drug Use Yes   • Types: Marijuana, Cocaine, Heroin, Fentanyl       Family History:  History reviewed  No pertinent family history  Physical Exam:     Vitals:   Blood Pressure: 100/69 (11/30/22 1615)  Pulse: 63 (11/30/22 1615)  Temperature: 98 6 °F (37 °C) (11/30/22 1615)  Temp Source: Temporal (11/30/22 1615)  Respirations: 18 (11/30/22 1615)  Height: 5' 5" (165 1 cm) (11/30/22 1615)  Weight - Scale: 61 2 kg (134 lb 14 7 oz) (11/30/22 1615)  SpO2: 99 % (11/30/22 1615)    Physical Exam  HENT:      Head: Normocephalic  Cardiovascular:      Rate and Rhythm: Normal rate and regular rhythm  Pulses: Normal pulses  Heart sounds: Normal heart sounds  Pulmonary:      Effort: Pulmonary effort is normal       Breath sounds: Normal breath sounds  Abdominal:      General: Abdomen is flat  Bowel sounds are normal       Palpations: Abdomen is soft  Musculoskeletal:         General: Normal range of motion  Cervical back: Normal range of motion     Skin:     Comments: Erythema and fluid collection right forearm   Neurological:      General: No focal deficit present  Mental Status: He is alert and oriented to person, place, and time  Mental status is at baseline  Psychiatric:         Mood and Affect: Mood normal          Behavior: Behavior normal          Thought Content: Thought content normal          Judgment: Judgment normal           Additional Data:     Lab Results:  Results from last 7 days   Lab Units 11/30/22  1316   WBC Thousand/uL 16 18*   HEMOGLOBIN g/dL 14 1   HEMATOCRIT % 43 6   PLATELETS Thousands/uL 255   NEUTROS PCT % 86*   LYMPHS PCT % 9*   MONOS PCT % 4   EOS PCT % 0     Results from last 7 days   Lab Units 11/30/22  1316   SODIUM mmol/L 141   POTASSIUM mmol/L 4 5   CHLORIDE mmol/L 104   CO2 mmol/L 27   BUN mg/dL 12   CREATININE mg/dL 0 82   ANION GAP mmol/L 10   CALCIUM mg/dL 9 8   ALBUMIN g/dL 4 2   TOTAL BILIRUBIN mg/dL 0 70   ALK PHOS U/L 104   ALT U/L 48   AST U/L 53   GLUCOSE RANDOM mg/dL 100*                 Results from last 7 days   Lab Units 11/28/22  1405   LACTIC ACID mmol/L 1 2   PROCALCITONIN ng/ml 0 38*       Lines/Drains:  Invasive Devices     Peripheral Intravenous Line  Duration           Peripheral IV 11/30/22 Left Antecubital <1 day                    Imaging: No pertinent imaging reviewed  No orders to display       EKG and Other Studies Reviewed on Admission:   · EKG: No EKG obtained  ** Please Note: This note has been constructed using a voice recognition system   **

## 2022-11-30 NOTE — ASSESSMENT & PLAN NOTE
Patient with history IV drug use  Has known abscess in volar component of right forearm  MRSA swab negative    -vancomycin  -plan for OR tomorrow  -NPO at midnight  -Tylenol, oxycodone and Dilaudid for pain control  -general surgery consulted, appreciate recommendations

## 2022-11-30 NOTE — ASSESSMENT & PLAN NOTE
Patient with history of IV drug use  Presenting with 5 days of right arm erythema and swelling  X-ray right arm negative for foreign body, osseous abnormality or soft tissue abnormality  Started on vancomycin by ED  ESR and CRP elevated  CT imaging revealed abscess in volar compartment    Patient decided to sudden leave against medical advice today, signed AMA paperwork however left before being seen by physician    IV removed prior to discharge by nurse

## 2022-11-30 NOTE — ED PROVIDER NOTES
History  Chief Complaint   Patient presents with   • Medical Problem     Pt states he was a pt up on the 7th floor and was supposed to be getting surgery today  States he left because it hurt too much and they wouldn't put him to sleep for it  Requesting to be readmitted for his surgery  HPI  31-year-old man who signed out against medical advice this morning presents to the ED for admission  Patient states he signed out against medical advice because he wanted to be put to sleep for his incision and drainage but his doctors would not put him to sleep  He states he is back because he is ready to get the surgery now  He reports ongoing right arm pain  He denies any new complaints or concerns  None       History reviewed  No pertinent past medical history  History reviewed  No pertinent surgical history  History reviewed  No pertinent family history  I have reviewed and agree with the history as documented  E-Cigarette/Vaping   • E-Cigarette Use Never User      E-Cigarette/Vaping Substances   • Nicotine No    • THC No    • CBD No    • Flavoring No    • Other No    • Unknown No      Social History     Tobacco Use   • Smoking status: Every Day     Packs/day: 0 50     Types: Cigarettes   • Smokeless tobacco: Never   Vaping Use   • Vaping Use: Never used   Substance Use Topics   • Alcohol use: Not Currently   • Drug use: Yes     Types: Marijuana, Cocaine, Heroin, Fentanyl       Review of Systems   All other systems reviewed and are negative  Physical Exam  Physical Exam  Vitals and nursing note reviewed  Constitutional:       General: He is not in acute distress  Appearance: He is well-developed  HENT:      Head: Normocephalic and atraumatic  Eyes:      Conjunctiva/sclera: Conjunctivae normal    Cardiovascular:      Rate and Rhythm: Regular rhythm  Tachycardia present  Heart sounds: No murmur heard  Pulmonary:      Effort: Pulmonary effort is normal  No respiratory distress  Breath sounds: Normal breath sounds  Abdominal:      Palpations: Abdomen is soft  Tenderness: There is no abdominal tenderness  Musculoskeletal:         General: No swelling  Cervical back: Neck supple  Comments: Tenderness palpation and erythema over right forearm   Skin:     General: Skin is warm and dry  Capillary Refill: Capillary refill takes less than 2 seconds  Neurological:      Mental Status: He is alert     Psychiatric:         Mood and Affect: Mood normal          Vital Signs  ED Triage Vitals [11/30/22 1212]   Temperature Pulse Respirations Blood Pressure SpO2   98 9 °F (37 2 °C) (!) 106 18 145/87 96 %      Temp Source Heart Rate Source Patient Position - Orthostatic VS BP Location FiO2 (%)   Oral Monitor Sitting Left arm --      Pain Score       --           Vitals:    11/30/22 1212 11/30/22 1446 11/30/22 1615   BP: 145/87 107/60 100/69   Pulse: (!) 106 82 63   Patient Position - Orthostatic VS: Sitting Lying Lying         Visual Acuity      ED Medications  Medications   nicotine (NICODERM CQ) 21 mg/24 hr TD 24 hr patch 1 patch (has no administration in time range)   vancomycin (VANCOCIN) IVPB (premix in dextrose) 1,000 mg 200 mL (has no administration in time range)   acetaminophen (TYLENOL) tablet 650 mg (has no administration in time range)   oxyCODONE (ROXICODONE) IR tablet 5 mg (has no administration in time range)   HYDROmorphone (DILAUDID) injection 0 5 mg (has no administration in time range)       Diagnostic Studies  Results Reviewed     Procedure Component Value Units Date/Time    Rapid drug screen, urine [[id number]]  (Abnormal) Collected: 11/30/22 1446    Lab Status: Final result Specimen: Urine, Clean Catch Updated: 11/30/22 1514     Amph/Meth UR Negative     Barbiturate Ur Negative     Benzodiazepine Urine Negative     Cocaine Urine Positive     Methadone Urine Negative     Opiate Urine Negative     PCP Ur Negative     THC Urine Positive     Oxycodone Urine Positive Narrative:      Presumptive report  If requested, specimen will be sent to reference lab for confirmation  FOR MEDICAL PURPOSES ONLY  IF CONFIRMATION NEEDED PLEASE CONTACT THE LAB WITHIN 5 DAYS      Drug Screen Cutoff Levels:  AMPHETAMINE/METHAMPHETAMINES  1000 ng/mL  BARBITURATES     200 ng/mL  BENZODIAZEPINES     200 ng/mL  COCAINE      300 ng/mL  METHADONE      300 ng/mL  OPIATES      300 ng/mL  PHENCYCLIDINE     25 ng/mL  THC       50 ng/mL  OXYCODONE      100 ng/mL    Comprehensive metabolic panel [[id number]]  (Abnormal) Collected: 11/30/22 1316    Lab Status: Final result Specimen: Blood from Arm, Left Updated: 11/30/22 1356     Sodium 141 mmol/L      Potassium 4 5 mmol/L      Chloride 104 mmol/L      CO2 27 mmol/L      ANION GAP 10 mmol/L      BUN 12 mg/dL      Creatinine 0 82 mg/dL      Glucose 100 mg/dL      Calcium 9 8 mg/dL      AST 53 U/L      ALT 48 U/L      Alkaline Phosphatase 104 U/L      Total Protein 8 9 g/dL      Albumin 4 2 g/dL      Total Bilirubin 0 70 mg/dL      eGFR 122 ml/min/1 73sq m     Narrative:      [facility] guidelines for Chronic Kidney Disease (CKD):   •  Stage 1 with normal or high GFR (GFR > 90 mL/min/1 73 square meters)  •  Stage 2 Mild CKD (GFR = 60-89 mL/min/1 73 square meters)  •  Stage 3A Moderate CKD (GFR = 45-59 mL/min/1 73 square meters)  •  Stage 3B Moderate CKD (GFR = 30-44 mL/min/1 73 square meters)  •  Stage 4 Severe CKD (GFR = 15-29 mL/min/1 73 square meters)  •  Stage 5 End Stage CKD (GFR <15 mL/min/1 73 square meters)  Note: GFR calculation is accurate only with a steady state creatinine    CBC and differential [[id number]]  (Abnormal) Collected: 11/30/22 1316    Lab Status: Final result Specimen: Blood from Arm, Left Updated: 11/30/22 1323     WBC 16 18 Thousand/uL      RBC 4 65 Million/uL      Hemoglobin 14 1 g/dL      Hematocrit 43 6 %      MCV 94 fL      MCH 30 3 pg      MCHC 32 3 g/dL      RDW 12 3 %      MPV 10 4 fL      Platelets 255 Thousands/uL      nRBC 0 /100 WBCs      Neutrophils Relative 86 %      Immat GRANS % 1 %      Lymphocytes Relative 9 %      Monocytes Relative 4 %      Eosinophils Relative 0 %      Basophils Relative 0 %      Neutrophils Absolute 13 89 Thousands/µL      Immature Grans Absolute 0 12 Thousand/uL      Lymphocytes Absolute 1 51 Thousands/µL      Monocytes Absolute 0 63 Thousand/µL      Eosinophils Absolute 0 01 Thousand/µL      Basophils Absolute 0 02 Thousands/µL                  No orders to display              Procedures  Procedures         ED Course                               SBIRT 22yo+    Flowsheet Row Most Recent Value   SBIRT (23 yo +)    In order to provide better care to our patients, we are screening all of our patients for alcohol and drug use  Would it be okay to ask you these screening questions? No Filed at: 11/30/2022 1256                    MDM  Number of Diagnoses or Management Options  Abscess of forearm  IVDU (intravenous drug user)  Pain  Diagnosis management comments: Discussed patient with surgery and Medicine    Patient will be admitted to medicine for IV antibiotics and plan for OR for wound debridement this week      Disposition  Final diagnoses:   Abscess of forearm   IVDU (intravenous drug user)   Pain     Time reflects when diagnosis was documented in both MDM as applicable and the Disposition within this note     Time User Action Codes Description Comment    11/30/2022  3:38 PM Rosa Peek Add [L02 419] Abscess of forearm     11/30/2022  3:38 PM Rosa Peek Add [F19 90] IVDU (intravenous drug user)     11/30/2022  3:38 PM Rosa Peek Add [R52] Pain     11/30/2022  4:12 PM Chary Sifuentes Add [L02 413] Abscess of arm, right       ED Disposition     ED Disposition   Admit    Condition   Stable    Date/Time   Wed Nov 30, 2022  3:37 PM    Comment   Case was discussed with KYLE and the patient's admission status was agreed to be Admission Status: inpatient status to the service of Dr Garnett Speak   Follow-up Information    None         There are no discharge medications for this patient  No discharge procedures on file      PDMP Review     None          ED Provider  Electronically Signed by           Leisa Jo MD  11/30/22 6474

## 2022-11-30 NOTE — PROGRESS NOTES
[facility]  Progress Note Margo Alcaraz 1995, 33 y o  male MRN: [id number]  Unit/Bed#: 7[facility] 707-01 Encounter: [id number]  Primary Care Provider: No primary care provider on file  Date and time admitted to hospital: 11/28/2022 12:39 PM    * Abscess of arm, right  Assessment & Plan  Patient with history of IV drug use  Presenting with 5 days of right arm erythema and swelling  X-ray right arm negative for foreign body, osseous abnormality or soft tissue abnormality  Started on vancomycin by ED  ESR and CRP elevated  CT imaging revealed abscess in volar compartment    -continue vancomycin  -serial exams  -monitor blood cultures  -trend CBC  -Tylenol and Toradol for pain control  -general surgery consulted, plan for OR with I&D later today    Polysubstance abuse ([facility] [address] )  Assessment & Plan  Patient with history of polysubstance abuse    -monitor for signs of withdrawal  -will schedule clonidine, Flexeril and gabapentin withdrawal symptoms    Elevated LFTs  Assessment & Plan  Chronic  Improved    -continue to monitor        VTE Pharmacologic Prophylaxis: VTE Score: 1 Low Risk (Score 0-2) - Encourage Ambulation  Patient Centered Rounds: I performed bedside rounds with nursing staff today  Discussions with Specialists or Other Care Team Provider:  General surgery    Education and Discussions with Family / Patient:  Discussed with patient all questions answered    Time Spent for Care: 30 minutes  More than 50% of total time spent on counseling and coordination of care as described above  Current Length of Stay: 2 day(s)  Current Patient Status: Inpatient   Certification Statement: The patient will continue to require additional inpatient hospital stay due to Abscess requiring I&D in operating room  Discharge Plan: Anticipate discharge in 24-48 hrs to home  Code Status: Prior    Subjective:   Patient seen and examined at bedside    Patient underwent bedside I and D yesterday, unfortunately patient will need further drainage in OR today  Denies fevers or chills    Objective:     Vitals:   Temp (24hrs), Av 6 °F (36 4 °C), Min:96 8 °F (36 °C), Max:98 1 °F (36 7 °C)    Temp:  [96 8 °F (36 °C)-98 1 °F (36 7 °C)] 96 8 °F (36 °C)  HR:  [64-80] 78  Resp:  [17-20] 20  BP: (122-142)/(70-78) 122/70  SpO2:  [97 %-99 %] 99 %  Body mass index is 22 45 kg/m²  Input and Output Summary (last 24 hours): Intake/Output Summary (Last 24 hours) at 2022 0750  Last data filed at 2022 1930  Gross per 24 hour   Intake 720 ml   Output --   Net 720 ml       Physical Exam:   Physical Exam  HENT:      Head: Normocephalic  Cardiovascular:      Rate and Rhythm: Normal rate and regular rhythm  Pulses: Normal pulses  Heart sounds: Normal heart sounds  Pulmonary:      Effort: Pulmonary effort is normal       Breath sounds: Normal breath sounds  Abdominal:      General: Abdomen is flat  Bowel sounds are normal       Palpations: Abdomen is soft  Musculoskeletal:         General: Normal range of motion  Skin:     Comments: Erythema right arm   Neurological:      General: No focal deficit present  Mental Status: He is alert and oriented to person, place, and time  Mental status is at baseline  Psychiatric:         Mood and Affect: Mood normal          Behavior: Behavior normal          Thought Content:  Thought content normal          Judgment: Judgment normal           Additional Data:     Labs:  Results from last 7 days   Lab Units 22  0440 22  1405   WBC Thousand/uL 13 21* 15 07*   HEMOGLOBIN g/dL 12 6 13 8   HEMATOCRIT % 38 3 42 6   PLATELETS Thousands/uL 185 212   NEUTROS PCT %  --  87*   LYMPHS PCT %  --  7*   MONOS PCT %  --  5   EOS PCT %  --  0     Results from last 7 days   Lab Units 22  0440   SODIUM mmol/L 136   POTASSIUM mmol/L 3 7   CHLORIDE mmol/L 97   CO2 mmol/L 32   BUN mg/dL 21   CREATININE mg/dL 0 75   ANION GAP mmol/L 7 CALCIUM mg/dL 8 3*   ALBUMIN g/dL 3 1*   TOTAL BILIRUBIN mg/dL 0 12*   ALK PHOS U/L 92   ALT U/L 46   AST U/L 49   GLUCOSE RANDOM mg/dL 102*                 Results from last 7 days   Lab Units 11/28/22  1405   LACTIC ACID mmol/L 1 2   PROCALCITONIN ng/ml 0 38*       Lines/Drains:  Invasive Devices     Peripheral Intravenous Line  Duration           Peripheral IV 11/28/22 Left Forearm 1 day                      Imaging: No pertinent imaging reviewed  Recent Cultures (last 7 days):   Results from last 7 days   Lab Units 11/28/22  1405   BLOOD CULTURE  No Growth at 24 hrs  No Growth at 24 hrs  Last 24 Hours Medication List:   Current Facility-Administered Medications   Medication Dose Route Frequency Provider Last Rate   • acetaminophen  650 mg Oral Q6H PRN Lisa Martino DO     • cloNIDine  0 1 mg Oral [facility] Lisa Martino DO     • cyclobenzaprine  10 mg Oral TID Lisa Martino DO     • gabapentin  300 mg Oral TID Lisa Martino DO     • ketorolac  30 mg Intravenous Q6H PRN Lisa Martino DO     • lidocaine (PF)  10 mL Infiltration Once Yoel Arana MD     • melatonin  3 mg Oral HS Josselyn Fernando PA-C     • oxyCODONE  5 mg Oral Q4H PRN Yoel Arana MD     • vancomycin  20 mg/kg Intravenous Q12H Lisa Martino DO 1,250 mg (11/30/22 0559)        Today, Patient Was Seen By: Lisa Martino DO    **Please Note: This note may have been constructed using a voice recognition system  **

## 2022-11-30 NOTE — PROGRESS NOTES
Vancomycin IV Pharmacy-to-Dose Consultation   [facility] 33 [facility]  [facility] who is currently receiving Vancomycin IV with management by the Pharmacy Consult service for a soft tissue infection  Lab Results   Component Value Date/Time    BUN 21 11/29/2022 04:40 AM    WBC 13 21 (H) 11/29/2022 04:40 AM    HGB 12 6 11/29/2022 04:40 AM    HCT 38 3 11/29/2022 04:40 AM    MCV 94 11/29/2022 04:40 AM     11/29/2022 04:40 AM      Creatinine   Date Value Ref Range Status   11/29/2022 0 75 0 70 - 1 50 mg/dL Final     Comment:     Standardized to IDMS reference method   11/28/2022 0 58 (L) 0 70 - 1 50 mg/dL Final     Comment:     Standardized to IDMS reference method      Vancomycin Assessment:   1  Indication: Soft tissue (goal -600, trough >10)     2  Renal Function:  stable, CrCl 133 4 ml/min  3  Potential Nephrotoxicity Factors: ketorolac   4  Days of Therapy:  2  5  Current Dose: 1250 mg IV every 12 hours   6  Goal Trough: -600, trough >10    7  Goal AUC(24h): 400-600       Vancomycin Plan:   1  Next Level:  vancomycin random 11/30/22 6:00 am  2  Renal Function Monitoring: BMP ordered    Pharmacy will continue to follow closely for s/sx of nephrotoxicity, infusion reactions and appropriateness of therapy      Eden Kimball, [address]    [phone]

## 2022-11-30 NOTE — PLAN OF CARE
Problem: Potential for Falls  Goal: Patient will remain free of falls  Description: INTERVENTIONS:  - Educate patient/family on patient safety including physical limitations  - Instruct patient to call for assistance with activity   - Consult OT/PT to assist with strengthening/mobility   - Keep Call bell within reach  - Keep bed low and locked with side rails adjusted as appropriate  - Keep care items and personal belongings within reach  - Initiate and maintain comfort rounds  - Make Fall Risk Sign visible to staff  - Apply yellow socks and bracelet for high fall risk patients  - Consider moving patient to room near nurses station  Outcome: Progressing     Problem: PAIN - ADULT  Goal: Verbalizes/displays adequate comfort level or baseline comfort level  Description: Interventions:  - Encourage patient to monitor pain and request assistance  - Assess pain using appropriate pain scale  - Administer analgesics based on type and severity of pain and evaluate response  - Implement non-pharmacological measures as appropriate and evaluate response  - Consider cultural and social influences on pain and pain management  - Notify physician/advanced practitioner if interventions unsuccessful or patient reports new pain  Outcome: Progressing     Problem: INFECTION - ADULT  Goal: Absence or prevention of progression during hospitalization  Description: INTERVENTIONS:  - Assess and monitor for signs and symptoms of infection  - Monitor lab/diagnostic results  - Monitor all insertion sites, i e  indwelling lines, tubes, and drains  - Administer medications as ordered  - Instruct and encourage patient and family to use good hand hygiene technique  - Identify and instruct in appropriate isolation precautions for identified infection/condition  Outcome: Progressing  Goal: Absence of fever/infection during neutropenic period  Description: INTERVENTIONS:  - Monitor WBC    Outcome: Progressing     Problem: SAFETY ADULT  Goal: Patient will remain free of falls  Description: INTERVENTIONS:  - Educate patient/family on patient safety including physical limitations  - Instruct patient to call for assistance with activity   - Consult OT/PT to assist with strengthening/mobility   - Keep Call bell within reach  - Keep bed low and locked with side rails adjusted as appropriate  - Keep care items and personal belongings within reach  - Initiate and maintain comfort rounds  - Make Fall Risk Sign visible to staff  - Apply yellow socks and bracelet for high fall risk patients  - Consider moving patient to room near nurses station  Outcome: Progressing  Goal: Maintain or return to baseline ADL function  Description: INTERVENTIONS:  -  Assess patient's ability to carry out ADLs; assess patient's baseline for ADL function and identify physical deficits which impact ability to perform ADLs (bathing, care of mouth/teeth, toileting, grooming, dressing, etc )  - Assess/evaluate cause of self-care deficits   - Assess range of motion  - Assess patient's mobility; develop plan if impaired  - Assess patient's need for assistive devices and provide as appropriate  - Encourage maximum independence but intervene and supervise when necessary  - Involve family in performance of ADLs  - Assess for home care needs following discharge   - Consider OT consult to assist with ADL evaluation and planning for discharge  - Provide patient education as appropriate  Outcome: Progressing  Goal: Maintains/Returns to pre admission functional level  Description: INTERVENTIONS:  - Perform BMAT or MOVE assessment daily    - Set and communicate daily mobility goal to care team and patient/family/caregiver     - Collaborate with rehabilitation services on mobility goals if consulted  - Ambulate patient 3 times a day  - Out of bed to chair 2 times a day   - Out of bed for meals 3 times a day  - Out of bed for toileting  - Record patient progress and toleration of activity level   Outcome: Progressing

## 2022-11-30 NOTE — NURSING NOTE
Pt was calm and collected this mornign during rounds  Now pt adamant about leaving AMA  Pt broke wheel off IV pole, and pulled at IV attempting to rip out  This RN advised Pt to stay  Pt refused  Pt educated on the risks and consequences that could result leaving AMA  Pt informed that he could return back to ED at anytime If needed after leaving  Pt and provider signed AMA form

## 2022-11-30 NOTE — DISCHARGE SUMMARY
[facility]  Discharge- [facility] 1995, 33 y o  male MRN: [id number]  Unit/Bed#: [facility] 707-01 Encounter: [id number]  Primary Care Provider: No primary care provider on file  Date and time admitted to hospital: 11/28/2022 12:39 PM    * Abscess of arm, right  Assessment & Plan  Patient with history of IV drug use  Presenting with 5 days of right arm erythema and swelling  X-ray right arm negative for foreign body, osseous abnormality or soft tissue abnormality  Started on vancomycin by ED  ESR and CRP elevated  CT imaging revealed abscess in volar compartment    Patient decided to sudden leave against medical advice today, signed AMA paperwork however left before being seen by physician  IV removed prior to discharge by nurse    Polysubstance abuse [facility])  Assessment & Plan  Patient with history of polysubstance abuse    Patient left AMA    Elevated LFTs  Assessment & Plan  Chronic  Improved    -continue to monitor        Medical Problems     Resolved Problems  Date Reviewed: 11/30/2022   None       Discharging Physician / Practitioner: Bonilla Lord DO  PCP: No primary care provider on file    Admission Date:   Admission Orders (From admission, onward)     Ordered        11/28/22 1447  [facility]  Once                      Discharge Date: 11/30/22    Consultations During Hospital Stay:  · General surgery    Procedures Performed:   · Bedside I/D on 11/29    Significant Findings / Test Results:   CT upper extremity w contrast right   Final Result   A 5 x 4 2 x 2 4 cm the abscess/fluid collection at the volar aspect of the proximal forearm abutting the volar compartment musculature   Mild edema in the superficial aspect of the flexor musculature in the proximal forearm   Superficial cellulitis          I personally discussed this study with Kashmir Corral on 11/28/2022 at 4:49 PM                Workstation performed: [id number]         XR forearm 2 views RIGHT   Final Result   No radiopaque foreign body      No acute osseous abnormality  Workstation performed: [id number]         ·         Test Results Pending at Discharge (will require follow up):   · none     Outpatient Tests Requested:  · none    Complications:  none    Reason for Admission:  Right arm abscess    Hospital Course:   Alexa Yang is a 33 y o  male patient who originally presented to the hospital on 11/28/2022 due to right arm abscess  CT imaging revealed 5 x 4 2 by 2 4 cm abscess in the volar aspect of forearm  Patient was treated with IV vancomycin  General surgery was consulted who performed bedside I&D on 11/29  Unfortunately some fluid remained afterwards patient was initially planned for OR on 11/30  Before patient could be taken down to OR, he stated he was leaving, despite counseling by nurse, patient left promptly  Signed AMA paperwork and left before being seen again by physician  Please see above list of diagnoses and related plan for additional information  Condition at Discharge: fair    Discharge Day Visit / Exam:   * Please refer to separate progress note for these details *    Discussion with Family: Patient declined call to   Discharge instructions/Information to patient and family:   See after visit summary for information provided to patient and family  Provisions for Follow-Up Care:  See after visit summary for information related to follow-up care and any pertinent home health orders  Disposition:   Left against medical advice    Planned Readmission: no     Discharge Statement:  I spent 43 minutes discharging the patient  This time was spent on the day of discharge  I had direct contact with the patient on the day of discharge   Greater than 50% of the total time was spent examining patient, answering all patient questions, arranging and discussing plan of care with patient as well as directly providing post-discharge instructions  Additional time then spent on discharge activities  Discharge Medications:  See after visit summary for reconciled discharge medications provided to patient and/or family        **Please Note: This note may have been constructed using a voice recognition system**

## 2022-11-30 NOTE — NURSING NOTE
Pt with blood work this am; multiple attempts for venipuncture without success  Pt refused any further attempts  Education provided on importance of obtaining blood work  On call provider made aware of same

## 2022-11-30 NOTE — PLAN OF CARE
Problem: Potential for Falls  Goal: Patient will remain free of falls  Description: INTERVENTIONS:  - Educate patient/family on patient safety including physical limitations  - Instruct patient to call for assistance with activity   - Consult OT/PT to assist with strengthening/mobility   - Keep Call bell within reach  - Keep bed low and locked with side rails adjusted as appropriate  - Keep care items and personal belongings within reach  - Initiate and maintain comfort rounds  - Make Fall Risk Sign visible to staff  - Apply yellow socks and bracelet for high fall risk patients  - Consider moving patient to room near nurses station  11/30/2022 1643 by Stanley Sampson RN  Outcome: Progressing  11/30/2022 1641 by Stanley Sampson RN  Outcome: Progressing     Problem: PAIN - ADULT  Goal: Verbalizes/displays adequate comfort level or baseline comfort level  Description: Interventions:  - Encourage patient to monitor pain and request assistance  - Assess pain using appropriate pain scale  - Administer analgesics based on type and severity of pain and evaluate response  - Implement non-pharmacological measures as appropriate and evaluate response  - Consider cultural and social influences on pain and pain management  - Notify physician/advanced practitioner if interventions unsuccessful or patient reports new pain  11/30/2022 1643 by Stanley Sampson RN  Outcome: Progressing  11/30/2022 1641 by Stanley Sampson RN  Outcome: Progressing     Problem: INFECTION - ADULT  Goal: Absence or prevention of progression during hospitalization  Description: INTERVENTIONS:  - Assess and monitor for signs and symptoms of infection  - Monitor lab/diagnostic results  - Monitor all insertion sites, i e  indwelling lines, tubes, and drains  - Administer medications as ordered  - Instruct and encourage patient and family to use good hand hygiene technique  - Identify and instruct in appropriate isolation precautions for identified infection/condition  11/30/2022 1643 by Osei Jean RN  Outcome: Progressing  11/30/2022 1641 by Osei Jean RN  Outcome: Progressing  Goal: Absence of fever/infection during neutropenic period  Description: INTERVENTIONS:  - Monitor WBC    11/30/2022 1643 by Osei Jean RN  Outcome: Progressing  11/30/2022 1641 by Osei Jean RN  Outcome: Progressing     Problem: SAFETY ADULT  Goal: Patient will remain free of falls  Description: INTERVENTIONS:  - Educate patient/family on patient safety including physical limitations  - Instruct patient to call for assistance with activity   - Consult OT/PT to assist with strengthening/mobility   - Keep Call bell within reach  - Keep bed low and locked with side rails adjusted as appropriate  - Keep care items and personal belongings within reach  - Initiate and maintain comfort rounds  - Make Fall Risk Sign visible to staff  - Apply yellow socks and bracelet for high fall risk patients  - Consider moving patient to room near nurses station  11/30/2022 1643 by Osei Jean RN  Outcome: Progressing  11/30/2022 1641 by Osei Jean RN  Outcome: Progressing  Goal: Maintain or return to baseline ADL function  Description: INTERVENTIONS:  -  Assess patient's ability to carry out ADLs; assess patient's baseline for ADL function and identify physical deficits which impact ability to perform ADLs (bathing, care of mouth/teeth, toileting, grooming, dressing, etc )  - Assess/evaluate cause of self-care deficits   - Assess range of motion  - Assess patient's mobility; develop plan if impaired  - Assess patient's need for assistive devices and provide as appropriate  - Encourage maximum independence but intervene and supervise when necessary  - Involve family in performance of ADLs  - Assess for home care needs following discharge   - Consider OT consult to assist with ADL evaluation and planning for discharge  - Provide patient education as appropriate  11/30/2022 1643 by Osei Jean RN  Outcome: Progressing  11/30/2022 1641 by Tish Sin RN  Outcome: Progressing  Goal: Maintains/Returns to pre admission functional level  Description: INTERVENTIONS:  - Perform BMAT or MOVE assessment daily    - Set and communicate daily mobility goal to care team and patient/family/caregiver  - Collaborate with rehabilitation services on mobility goals if consulted  - Stand patient 3 times a day  - Ambulate patient 3 times a day  - Out of bed to chair 2 times a day   - Out of bed for meals 3 times a day  - Out of bed for toileting  - Record patient progress and toleration of activity level   11/30/2022 1643 by Tish Sin RN  Outcome: Progressing  11/30/2022 1641 by Tish Sin RN  Outcome: Progressing     Problem: DISCHARGE PLANNING  Goal: Discharge to home or other facility with appropriate resources  Description: INTERVENTIONS:  - Identify barriers to discharge w/patient and caregiver  - Arrange for needed discharge resources and transportation as appropriate  - Identify discharge learning needs (meds, wound care, etc )  - Refer to Case Management Department for coordinating discharge planning if the patient needs post-hospital services based on physician/advanced practitioner order or complex needs related to functional status, cognitive ability, or social support system  11/30/2022 1643 by Tish Sin RN  Outcome: Progressing  11/30/2022 1641 by Tish Sin RN  Outcome: Progressing     Problem: Knowledge Deficit  Goal: Patient/family/caregiver demonstrates understanding of disease process, treatment plan, medications, and discharge instructions  Description: Complete learning assessment and assess knowledge base    Interventions:  - Provide teaching at level of understanding  - Provide teaching via preferred learning methods  11/30/2022 1643 by Tish Sin RN  Outcome: Progressing  11/30/2022 1641 by Tish Sin RN  Outcome: Progressing     Problem: SKIN/TISSUE INTEGRITY - ADULT  Goal: Incision(s), wounds(s) or drain site(s) healing without S/S of infection  Description: INTERVENTIONS  - Assess and document dressing, incision, wound bed, drain sites and surrounding tissue  - Provide patient and family education  11/30/2022 1643 by Ramirez Benavides RN  Outcome: Progressing  11/30/2022 1641 by Ramirez Benavides, RN  Outcome: Progressing

## 2022-11-30 NOTE — PROGRESS NOTES
Roberta Bledsoe is a 33 y o  male who is currently ordered Vancomycin IV with management by the Pharmacy Consult service  Relevant clinical data and objective / subjective history reviewed  Vancomycin Assessment:  Indication and Goal AUC/Trough: Soft tissue (goal -600, trough >10), -600, trough >10  Clinical Status: stable  Renal Function:  SCr: 0 82 mg/dL  CrCl: 122 mL/min  Days of Therapy: 3  Current Dose: 1000 mg IV every 12 hours  Vancomycin Plan:  New Dosin mg IV every 12 hours  Estimated AUC:  450 mcg*hr/mL  Estimated Trough: 12 7 mcg/mL  Next Level: 22 at 6:00  Renal Function Monitoring: Daily TRISTAN and José Miguel will continue to follow closely for s/sx of nephrotoxicity, infusion reactions and appropriateness of therapy  BMP  will be ordered per protocol  We will continue to follow the patient’s culture results and clinical progress daily      David Henry, Pharmacist

## 2022-12-01 ENCOUNTER — ANESTHESIA EVENT (INPATIENT)
Dept: PERIOP | Facility: HOSPITAL | Age: 27
End: 2022-12-01

## 2022-12-01 ENCOUNTER — ANESTHESIA (INPATIENT)
Dept: PERIOP | Facility: HOSPITAL | Age: 27
End: 2022-12-01

## 2022-12-01 VITALS
HEART RATE: 93 BPM | TEMPERATURE: 97.2 F | SYSTOLIC BLOOD PRESSURE: 147 MMHG | BODY MASS INDEX: 22.48 KG/M2 | WEIGHT: 134.92 LBS | OXYGEN SATURATION: 99 % | RESPIRATION RATE: 16 BRPM | DIASTOLIC BLOOD PRESSURE: 92 MMHG | HEIGHT: 65 IN

## 2022-12-01 LAB
ALBUMIN SERPL BCP-MCNC: 3.3 G/DL (ref 3.5–5)
ALP SERPL-CCNC: 72 U/L (ref 43–122)
ALT SERPL W P-5'-P-CCNC: 41 U/L
ANION GAP SERPL CALCULATED.3IONS-SCNC: 7 MMOL/L (ref 5–14)
AST SERPL W P-5'-P-CCNC: 38 U/L (ref 17–59)
ATRIAL RATE: 75 BPM
ATRIAL RATE: 84 BPM
BACTERIA WND AEROBE CULT: ABNORMAL
BASOPHILS # BLD AUTO: 0.05 THOUSANDS/ÂΜL (ref 0–0.1)
BASOPHILS NFR BLD AUTO: 1 % (ref 0–1)
BILIRUB SERPL-MCNC: 0.5 MG/DL (ref 0.2–1)
BUN SERPL-MCNC: 15 MG/DL (ref 5–25)
CALCIUM ALBUM COR SERPL-MCNC: 9.5 MG/DL (ref 8.3–10.1)
CALCIUM SERPL-MCNC: 8.9 MG/DL (ref 8.4–10.2)
CHLORIDE SERPL-SCNC: 102 MMOL/L (ref 96–108)
CO2 SERPL-SCNC: 27 MMOL/L (ref 21–32)
CREAT SERPL-MCNC: 0.72 MG/DL (ref 0.7–1.5)
EOSINOPHIL # BLD AUTO: 0.29 THOUSAND/ÂΜL (ref 0–0.61)
EOSINOPHIL NFR BLD AUTO: 3 % (ref 0–6)
ERYTHROCYTE [DISTWIDTH] IN BLOOD BY AUTOMATED COUNT: 12.4 % (ref 11.6–15.1)
GFR SERPL CREATININE-BSD FRML MDRD: 128 ML/MIN/1.73SQ M
GLUCOSE SERPL-MCNC: 89 MG/DL (ref 70–99)
GRAM STN SPEC: ABNORMAL
GRAM STN SPEC: ABNORMAL
HCT VFR BLD AUTO: 40.2 % (ref 36.5–49.3)
HGB BLD-MCNC: 13 G/DL (ref 12–17)
IMM GRANULOCYTES # BLD AUTO: 0.09 THOUSAND/UL (ref 0–0.2)
IMM GRANULOCYTES NFR BLD AUTO: 1 % (ref 0–2)
INR PPP: 1.18 (ref 0.84–1.19)
LYMPHOCYTES # BLD AUTO: 2.75 THOUSANDS/ÂΜL (ref 0.6–4.47)
LYMPHOCYTES NFR BLD AUTO: 25 % (ref 14–44)
MCH RBC QN AUTO: 30.4 PG (ref 26.8–34.3)
MCHC RBC AUTO-ENTMCNC: 32.3 G/DL (ref 31.4–37.4)
MCV RBC AUTO: 94 FL (ref 82–98)
MONOCYTES # BLD AUTO: 0.95 THOUSAND/ÂΜL (ref 0.17–1.22)
MONOCYTES NFR BLD AUTO: 9 % (ref 4–12)
NEUTROPHILS # BLD AUTO: 6.7 THOUSANDS/ÂΜL (ref 1.85–7.62)
NEUTS SEG NFR BLD AUTO: 61 % (ref 43–75)
NRBC BLD AUTO-RTO: 0 /100 WBCS
P AXIS: 57 DEGREES
P AXIS: 76 DEGREES
PLATELET # BLD AUTO: 238 THOUSANDS/UL (ref 149–390)
PMV BLD AUTO: 10.9 FL (ref 8.9–12.7)
POTASSIUM SERPL-SCNC: 3.8 MMOL/L (ref 3.5–5.3)
PR INTERVAL: 124 MS
PR INTERVAL: 130 MS
PROT SERPL-MCNC: 7 G/DL (ref 6.4–8.4)
PROTHROMBIN TIME: 15.4 SECONDS (ref 11.6–14.5)
QRS AXIS: 37 DEGREES
QRS AXIS: 69 DEGREES
QRSD INTERVAL: 76 MS
QRSD INTERVAL: 78 MS
QT INTERVAL: 374 MS
QT INTERVAL: 388 MS
QTC INTERVAL: 433 MS
QTC INTERVAL: 441 MS
RBC # BLD AUTO: 4.28 MILLION/UL (ref 3.88–5.62)
SODIUM SERPL-SCNC: 136 MMOL/L (ref 135–147)
T WAVE AXIS: 20 DEGREES
T WAVE AXIS: 31 DEGREES
VANCOMYCIN TROUGH SERPL-MCNC: 8.4 UG/ML (ref 10–20)
VENTRICULAR RATE: 75 BPM
VENTRICULAR RATE: 84 BPM
WBC # BLD AUTO: 10.83 THOUSAND/UL (ref 4.31–10.16)

## 2022-12-01 PROCEDURE — 0J9G0ZZ DRAINAGE OF RIGHT LOWER ARM SUBCUTANEOUS TISSUE AND FASCIA, OPEN APPROACH: ICD-10-PCS | Performed by: SURGERY

## 2022-12-01 RX ORDER — FENTANYL CITRATE/PF 50 MCG/ML
50 SYRINGE (ML) INJECTION
Status: DISCONTINUED | OUTPATIENT
Start: 2022-12-01 | End: 2022-12-01 | Stop reason: HOSPADM

## 2022-12-01 RX ORDER — DOXYCYCLINE HYCLATE 100 MG/1
100 CAPSULE ORAL EVERY 12 HOURS SCHEDULED
Qty: 14 CAPSULE | Refills: 0 | Status: SHIPPED | OUTPATIENT
Start: 2022-12-01 | End: 2022-12-08

## 2022-12-01 RX ORDER — LIDOCAINE HYDROCHLORIDE 10 MG/ML
INJECTION, SOLUTION EPIDURAL; INFILTRATION; INTRACAUDAL; PERINEURAL AS NEEDED
Status: DISCONTINUED | OUTPATIENT
Start: 2022-12-01 | End: 2022-12-01

## 2022-12-01 RX ORDER — DEXMEDETOMIDINE HYDROCHLORIDE 100 UG/ML
INJECTION, SOLUTION INTRAVENOUS AS NEEDED
Status: DISCONTINUED | OUTPATIENT
Start: 2022-12-01 | End: 2022-12-01

## 2022-12-01 RX ORDER — KETOROLAC TROMETHAMINE 30 MG/ML
15 INJECTION, SOLUTION INTRAMUSCULAR; INTRAVENOUS EVERY 6 HOURS SCHEDULED
Status: DISCONTINUED | OUTPATIENT
Start: 2022-12-01 | End: 2022-12-01 | Stop reason: HOSPADM

## 2022-12-01 RX ORDER — FENTANYL CITRATE 50 UG/ML
INJECTION, SOLUTION INTRAMUSCULAR; INTRAVENOUS AS NEEDED
Status: DISCONTINUED | OUTPATIENT
Start: 2022-12-01 | End: 2022-12-01

## 2022-12-01 RX ORDER — SODIUM CHLORIDE, SODIUM LACTATE, POTASSIUM CHLORIDE, CALCIUM CHLORIDE 600; 310; 30; 20 MG/100ML; MG/100ML; MG/100ML; MG/100ML
50 INJECTION, SOLUTION INTRAVENOUS CONTINUOUS
Status: DISCONTINUED | OUTPATIENT
Start: 2022-12-01 | End: 2022-12-01 | Stop reason: HOSPADM

## 2022-12-01 RX ORDER — KETAMINE HCL IN NACL, ISO-OSM 100MG/10ML
SYRINGE (ML) INJECTION AS NEEDED
Status: DISCONTINUED | OUTPATIENT
Start: 2022-12-01 | End: 2022-12-01

## 2022-12-01 RX ORDER — ONDANSETRON 2 MG/ML
INJECTION INTRAMUSCULAR; INTRAVENOUS AS NEEDED
Status: DISCONTINUED | OUTPATIENT
Start: 2022-12-01 | End: 2022-12-01

## 2022-12-01 RX ORDER — MIDAZOLAM HYDROCHLORIDE 2 MG/2ML
INJECTION, SOLUTION INTRAMUSCULAR; INTRAVENOUS AS NEEDED
Status: DISCONTINUED | OUTPATIENT
Start: 2022-12-01 | End: 2022-12-01

## 2022-12-01 RX ORDER — DEXAMETHASONE SODIUM PHOSPHATE 10 MG/ML
INJECTION, SOLUTION INTRAMUSCULAR; INTRAVENOUS AS NEEDED
Status: DISCONTINUED | OUTPATIENT
Start: 2022-12-01 | End: 2022-12-01

## 2022-12-01 RX ORDER — ONDANSETRON 2 MG/ML
4 INJECTION INTRAMUSCULAR; INTRAVENOUS ONCE AS NEEDED
Status: DISCONTINUED | OUTPATIENT
Start: 2022-12-01 | End: 2022-12-01 | Stop reason: HOSPADM

## 2022-12-01 RX ORDER — ACETAMINOPHEN 325 MG/1
650 TABLET ORAL EVERY 6 HOURS SCHEDULED
Status: DISCONTINUED | OUTPATIENT
Start: 2022-12-01 | End: 2022-12-01 | Stop reason: HOSPADM

## 2022-12-01 RX ORDER — PROPOFOL 10 MG/ML
INJECTION, EMULSION INTRAVENOUS AS NEEDED
Status: DISCONTINUED | OUTPATIENT
Start: 2022-12-01 | End: 2022-12-01

## 2022-12-01 RX ORDER — SODIUM CHLORIDE, SODIUM LACTATE, POTASSIUM CHLORIDE, CALCIUM CHLORIDE 600; 310; 30; 20 MG/100ML; MG/100ML; MG/100ML; MG/100ML
INJECTION, SOLUTION INTRAVENOUS CONTINUOUS PRN
Status: DISCONTINUED | OUTPATIENT
Start: 2022-12-01 | End: 2022-12-01

## 2022-12-01 RX ORDER — MAGNESIUM HYDROXIDE 1200 MG/15ML
LIQUID ORAL AS NEEDED
Status: DISCONTINUED | OUTPATIENT
Start: 2022-12-01 | End: 2022-12-01 | Stop reason: HOSPADM

## 2022-12-01 RX ADMIN — LIDOCAINE HYDROCHLORIDE 50 MG: 10 INJECTION, SOLUTION EPIDURAL; INFILTRATION; INTRACAUDAL; PERINEURAL at 09:03

## 2022-12-01 RX ADMIN — FENTANYL CITRATE 100 MCG: 50 INJECTION INTRAMUSCULAR; INTRAVENOUS at 09:20

## 2022-12-01 RX ADMIN — DEXAMETHASONE SODIUM PHOSPHATE 10 MG: 10 INJECTION, SOLUTION INTRAMUSCULAR; INTRAVENOUS at 09:03

## 2022-12-01 RX ADMIN — DEXMEDETOMIDINE HYDROCHLORIDE 20 MCG: 100 INJECTION, SOLUTION INTRAVENOUS at 09:11

## 2022-12-01 RX ADMIN — PROPOFOL 200 MG: 10 INJECTION, EMULSION INTRAVENOUS at 09:03

## 2022-12-01 RX ADMIN — VANCOMYCIN HYDROCHLORIDE 1000 MG: 1 INJECTION, SOLUTION INTRAVENOUS at 05:48

## 2022-12-01 RX ADMIN — MIDAZOLAM 2 MG: 1 INJECTION INTRAMUSCULAR; INTRAVENOUS at 08:56

## 2022-12-01 RX ADMIN — ONDANSETRON 4 MG: 2 INJECTION INTRAMUSCULAR; INTRAVENOUS at 09:03

## 2022-12-01 RX ADMIN — Medication 25 MG: at 09:03

## 2022-12-01 RX ADMIN — Medication 25 MG: at 09:11

## 2022-12-01 RX ADMIN — SODIUM CHLORIDE, SODIUM LACTATE, POTASSIUM CHLORIDE, AND CALCIUM CHLORIDE: .6; .31; .03; .02 INJECTION, SOLUTION INTRAVENOUS at 09:03

## 2022-12-01 NOTE — NURSING NOTE
IV removed with tip intact  Pressure held to control bleeding  Discharge instructions discussed with patient and verbalizes understanding  Patient left with all his discharge instructions  Left without his lighter which is in security  Left AMA, but antibiotic prescription to [address] [place] [address]

## 2022-12-01 NOTE — ASSESSMENT & PLAN NOTE
Patient with history IV drug use  Has known abscess in volar component of right forearm  MRSA swab negative    Patient underwent I&D today, afterwards decided to leave against medical advice    Patient will be discharged with course of doxycycline  Intraoperative culture results pending at time of discharge  Ambulatory Wound care referral placed  Patient signed AMA paperwork and understands and accepts risk of leaving against medical advice

## 2022-12-01 NOTE — PROGRESS NOTES
[facility]  Progress Note Jacquelyn Wylie 1995, 33 y o  male MRN: [id number]  Unit/Bed#: 7T [facility] 709-01 Encounter: [id number]  Primary Care Provider: No primary care provider on file  Date and time admitted to hospital: 2022 12:18 PM    * Abscess of arm, right  Assessment & Plan  Patient with history IV drug use  Has known abscess in volar component of right forearm  MRSA swab negative    -vancomycin plan to transition to p o  Antibiotics after surgery  -plan for OR today for I&D  -NPO, will restart diet after I&D  -Tylenol, oxycodone and Dilaudid for pain control  -general surgery consulted, appreciate recommendations    Polysubstance abuse ([facility] [address] )  Assessment & Plan  UDS positive for oxycodone, THC and cocaine  Received oxycodone during hospitalization      Opioid use disorder, severe, dependence ([facility] [address] )  Assessment & Plan  -will monitor for signs of withdrawal      VTE Pharmacologic Prophylaxis: VTE Score: 1 Low Risk (Score 0-2) - Encourage Ambulation  Patient Centered Rounds: I performed bedside rounds with nursing staff today  Discussions with Specialists or Other Care Team Provider:  General surgery    Education and Discussions with Family / Patient:  Discussed with patient all questions answered    Time Spent for Care: 30 minutes  More than 50% of total time spent on counseling and coordination of care as described above  Current Length of Stay: 1 day(s)  Current Patient Status: Inpatient   Certification Statement: The patient will continue to require additional inpatient hospital stay due to Abscess requiring I&D in the OR  Discharge Plan: Anticipate discharge in 24-48 hrs to home  Code Status: Level 1 - Full Code    Subjective:   Patient seen and examined at the bedside  Patient resting bed no apparent distress    Patient states he will stay for procedure today    Objective:     Vitals:   Temp (24hrs), Av 4 °F (36 9 °C), Min:97 8 °F (36 6 °C), Max:98 9 °F (37 2 °C)    Temp:  [97 8 °F (36 6 °C)-98 9 °F (37 2 °C)] 98 4 °F (36 9 °C)  HR:  [] 68  Resp:  [16-20] 19  BP: (100-145)/(60-87) 120/72  SpO2:  [96 %-100 %] 100 %  Body mass index is 22 45 kg/m²  Input and Output Summary (last 24 hours): Intake/Output Summary (Last 24 hours) at 12/1/2022 0815  Last data filed at 11/30/2022 2015  Gross per 24 hour   Intake 520 ml   Output --   Net 520 ml       Physical Exam:   Physical Exam  HENT:      Head: Normocephalic  Cardiovascular:      Rate and Rhythm: Normal rate and regular rhythm  Pulmonary:      Effort: Pulmonary effort is normal       Breath sounds: Normal breath sounds  Abdominal:      General: Abdomen is flat  Bowel sounds are normal       Palpations: Abdomen is soft  Musculoskeletal:         General: Normal range of motion  Cervical back: Normal range of motion  Skin:     Comments: Erythema and subcutaneous fluid collection right arm   Neurological:      General: No focal deficit present  Mental Status: He is alert and oriented to person, place, and time  Mental status is at baseline  Psychiatric:         Mood and Affect: Mood normal          Behavior: Behavior normal          Thought Content:  Thought content normal          Judgment: Judgment normal           Additional Data:     Labs:  Results from last 7 days   Lab Units 12/01/22  0518   WBC Thousand/uL 10 83*   HEMOGLOBIN g/dL 13 0   HEMATOCRIT % 40 2   PLATELETS Thousands/uL 238   NEUTROS PCT % 61   LYMPHS PCT % 25   MONOS PCT % 9   EOS PCT % 3     Results from last 7 days   Lab Units 12/01/22  0518   SODIUM mmol/L 136   POTASSIUM mmol/L 3 8   CHLORIDE mmol/L 102   CO2 mmol/L 27   BUN mg/dL 15   CREATININE mg/dL 0 72   ANION GAP mmol/L 7   CALCIUM mg/dL 8 9   ALBUMIN g/dL 3 3*   TOTAL BILIRUBIN mg/dL 0 50   ALK PHOS U/L 72   ALT U/L 41   AST U/L 38   GLUCOSE RANDOM mg/dL 89     Results from last 7 days   Lab Units 12/01/22  0518   INR  1 18             Results from last 7 days   Lab Units 11/28/22  1405   LACTIC ACID mmol/L 1 2   PROCALCITONIN ng/ml 0 38*       Lines/Drains:  Invasive Devices     Peripheral Intravenous Line  Duration           Peripheral IV 11/30/22 Left Antecubital <1 day                      Imaging: No pertinent imaging reviewed  Recent Cultures (last 7 days):   Results from last 7 days   Lab Units 11/29/22  1432 11/28/22  1405   BLOOD CULTURE   --  No Growth at 48 hrs  No Growth at 48 hrs  GRAM STAIN RESULT  2+ Polys  No bacteria seen  --    WOUND CULTURE  Culture too young- will reincubate  --        Last 24 Hours Medication List:   Current Facility-Administered Medications   Medication Dose Route Frequency Provider Last Rate   • acetaminophen  650 mg Oral Q6H PRN Christina Simmons DO     • HYDROmorphone  0 5 mg Intravenous Q4H PRN Christina Simmons DO     • nicotine  1 patch Transdermal Daily Christina Simmons DO     • oxyCODONE  5 mg Oral Q4H PRN Christina Simmons DO     • vancomycin  15 mg/kg Intravenous Q12H Christina Simmons DO 1,000 mg (12/01/22 0548)        Today, Patient Was Seen By: Christina Simmons DO    **Please Note: This note may have been constructed using a voice recognition system  **

## 2022-12-01 NOTE — UTILIZATION REVIEW
NOTIFICATION OF INPATIENT ADMISSION   AUTHORIZATION REQUEST   SERVICING FACILITY:   [address] [place] [address]  [address] , [facility], [address] [place] [address]  Tax ID: [id number]  NPI: [id number] ATTENDING PROVIDER:  Attending Name and NPI#: Mona [id number] [[id number]]  Address: [address] , [address]  Phone: [phone]     ADMISSION INFORMATION:  Place of Service: [address] [place] [address] Code: 21  Inpatient Admission Date/Time: 11/30/22  3:39 PM  Discharge Date/Time: 12/1/2022 11:58 AM  Admitting Diagnosis Code/Description:  Abscess of forearm [L02 419]  Pain [R52]  IVDU (intravenous drug user) [F19 90]  Abscess of arm, right [L02 413]  Known medical problems [Z78 9]     UTILIZATION REVIEW CONTACT:  Linnette Runner, Utilization   Network Utilization Review Department  Phone: [phone]  Fax [phone]  Email: [email or web address]  Contact for approvals/pending authorizations, clinical reviews, and discharge  PHYSICIAN ADVISORY SERVICES:  Medical Necessity Denial & [employer] Review  Phone: [phone]  Fax: [phone]  Email: [email or web address]

## 2022-12-01 NOTE — ANESTHESIA PREPROCEDURE EVALUATION
Procedure:  INCISION AND DRAINAGE (I&D) EXTREMITY (Right: Arm)    Relevant Problems   No relevant active problems

## 2022-12-01 NOTE — ASSESSMENT & PLAN NOTE
Patient with history IV drug use  Has known abscess in volar component of right forearm  MRSA swab negative    -vancomycin plan to transition to p o   Antibiotics after surgery  -plan for OR today for I&D  -NPO, will restart diet after I&D  -Tylenol, oxycodone and Dilaudid for pain control  -general surgery consulted, appreciate recommendations

## 2022-12-01 NOTE — OP NOTE
OPERATIVE REPORT  PATIENT NAME: Rosa Sagastume    :  1995  MRN: [id number]  Pt Location:  OR ROOM 08    SURGERY DATE: 2022    Surgeon(s) and Role:     * Brooke Alfonso MD - Primary     * Martha Barrow PA-C - Assisting    Preop Diagnosis:  Abscess of arm, right [L02 413]    Post-Op Diagnosis Codes:     * Abscess of arm, right [L02 413]    Procedure(s) (LRB):  INCISION AND DRAINAGE (I&D) EXTREMITY (Right)    Specimen(s):  ID Type Source Tests Collected by Time Destination   A : Right arm abscess Tissue Arm, Right CULTURE, TISSUE AND GRAM STAIN Brooke Alfonso MD 2022 9920    B : Right arm abscess Tissue Arm, Right ANAEROBIC CULTURE AND GRAM STAIN Brooke Alfonso MD 2022 9314        Estimated Blood Loss:   Minimal    Drains:  * No LDAs found *    Anesthesia Type:   General    Operative Indications:  Abscess of arm, right [L02 413]      Operative Findings:  Abscess cavity abutting volar muscles     Complications:   None    Procedure and Technique:  Patient was identified in the preoperative holding area and taken back to the operating room  The patient was positioned supine on the operating room table  General anesthesia was then induced and the patient was prepped and draped in the standard sterile surgical fashion  Preoperative time-out was held confirming the correct patient, correct procedure and that all necessary equipment and personnel were present within the operating room  A hemostat was used to bluntly probed the previous incision and a large amount of purulence exuded from the wound  This was sent for culture  The incision was extended slightly cephalad with electrocautery and the deeper cavity opened and probed  The wound was thoroughly irrigated and suctioned  The cavity was cauterized with electrocautery and hemostasis was achieved   A piece of 1/2 inch iodoform packing was packed into the deeper cavity abutting the volar muscles and a 4x4 gauze superficially packed on top of this within the superficial cavity  Gauze, ABD pad and Kerlix dressing then placed and secured with tape  The patient was then awoken from general anesthesia and taken to the postoperative Care Unit  in good condition  All counts were correct at the end of the case       I was present for the entire procedure, A qualified resident physician was not available and A physician assistant was required during the procedure for retraction tissue handling,dissection and suturing    Patient Disposition:  PACU  and hemodynamically stable        SIGNATURE: Nico Gomez MD  DATE: December 1, 2022  TIME: 9:42 AM

## 2022-12-01 NOTE — UTILIZATION REVIEW
Initial Clinical Review    Admission: Date/Time/Statement:   Admission Orders (From admission, onward)     Ordered        11/30/22 1538  INPATIENT ADMISSION  Once                      Orders Placed This Encounter   Procedures   • INPATIENT ADMISSION     Standing Status:   Standing     Number of Occurrences:   1     Order Specific Question:   Level of Care     Answer:   Med Surg [16]     Order Specific Question:   Estimated length of stay     Answer:   More than 2 Midnights     Order Specific Question:   Certification     Answer:   I certify that inpatient services are medically necessary for this patient for a duration of greater than two midnights  See H&P and MD Progress Notes for additional information about the patient's course of treatment  ED Arrival Information     Expected   -    Arrival   11/30/2022 11:56    Acuity   Urgent            Means of arrival   Walk-In    Escorted by   Self    Service   Hospitalist    Admission type   Emergency            Arrival complaint   arm problem, vomiting           Chief Complaint   Patient presents with   • Medical Problem     Pt states he was a pt up on the 7th floor and was supposed to be getting surgery today  States he left because it hurt too much and they wouldn't put him to sleep for it  Requesting to be readmitted for his surgery  Initial Presentation: 33 y o  male who presented self from home to [address] [facility] ED  Inpatient admission for evaluation and treatment of abscess of R forearm  PMHx: Substance use  Presented after leaving AMA earlier today, now agreeable to surgical intervention of abscess  On exam, R forearm erythema w/ fluid collection  Plan: IV ABX, IVF, NPO, OR tomorrow, analgesics, Trend labs, replete electrolytes as needed  General Surgery consulted  Date: 12/01/22   Day 2: Exam unchanged from presentation   Plan: to OR this morning for I&D, IVF, IV ABX, analgesics, supportive care, Trend labs, replete electrolytes as needed  General Surgery Consult: Pt w/ RUE abscess  To OR for I&D of RUE abscess, follow fluid cultures, wound care post-operatively      12/1 Surgery  Procedure: INCISION AND DRAINAGE (I&D) EXTREMITY (Right)  Indication: Abscess of arm, right [L02 413]  Anesthesia: General  ASA Score: 3  Findings: Abscess cavity abutting volar muscles     ED Triage Vitals   Temperature Pulse Respirations Blood Pressure SpO2   11/30/22 1212 11/30/22 1212 11/30/22 1212 11/30/22 1212 11/30/22 1212   98 9 °F (37 2 °C) (!) 106 18 145/87 96 %      Temp Source Heart Rate Source Patient Position - Orthostatic VS BP Location FiO2 (%)   11/30/22 1212 11/30/22 1212 11/30/22 1212 11/30/22 1212 --   Oral Monitor Sitting Left arm       Pain Score       11/30/22 1630       7          Wt Readings from Last 1 Encounters:   11/30/22 61 2 kg (134 lb 14 7 oz)     Additional Vital Signs:   Date/Time Temp Pulse Resp BP MAP (mmHg) SpO2 Calculated FIO2 (%) - Nasal Cannula Nasal Cannula O2 Flow Rate (L/min) O2 Device   12/01/22 0957 -- 93 16 147/92 114 99 % -- -- None (Room air)   12/01/22 0955 -- 95 19 147/92 114 97 % -- -- None (Room air)   12/01/22 0945 -- 103 16 149/95 -- 100 % 36 4 L/min Nasal cannula   12/01/22 0942 97 2 °F (36 2 °C) Abnormal  93 16 133/78 -- 100 % 36 4 L/min Nasal cannula   12/01/22 0743 98 4 °F (36 9 °C) 68 19 120/72 81 100 % -- -- None (Room air)   11/30/22 2330 97 8 °F (36 6 °C) 59 20 111/62 70 99 % -- -- None (Room air)   11/30/22 1615 98 6 °F (37 °C) 63 18 100/69 74 99 % -- -- None (Room air)   11/30/22 1446 -- 82 16 107/60 -- 96 % -- -- None (Room air)     Pertinent Labs/Diagnostic Test Results:   Results from last 7 days   Lab Units 12/01/22  0518 11/30/22  1316 11/29/22  0440 11/28/22  1405   WBC Thousand/uL 10 83* 16 18* 13 21* 15 07*   HEMOGLOBIN g/dL 13 0 14 1 12 6 13 8   HEMATOCRIT % 40 2 43 6 38 3 42 6   PLATELETS Thousands/uL 238 255 185 212   NEUTROS ABS Thousands/µL 6 70 13 89*  --  13 02*         Results from last 7 days   Lab Units 12/01/22  0518 11/30/22  1316 11/29/22  0440 11/28/22  1405   SODIUM mmol/L 136 141 136 138   POTASSIUM mmol/L 3 8 4 5 3 7 4 7   CHLORIDE mmol/L 102 104 97 101   CO2 mmol/L 27 27 32 29   ANION GAP mmol/L 7 10 7 8   BUN mg/dL 15 12 21 16   CREATININE mg/dL 0 72 0 82 0 75 0 58*   EGFR ml/min/1 73sq m 128 122 126 140   CALCIUM mg/dL 8 9 9 8 8 3* 9 0   MAGNESIUM mg/dL  --   --  1 9  --      Results from last 7 days   Lab Units 12/01/22  0518 11/30/22  1316 11/29/22  0440 11/28/22  1405   AST U/L 38 53 49 50   ALT U/L 41 48 46 60*   ALK PHOS U/L 72 104 92 98   TOTAL PROTEIN g/dL 7 0 8 9* 6 6 7 9   ALBUMIN g/dL 3 3* 4 2 3 1* 3 7   TOTAL BILIRUBIN mg/dL 0 50 0 70 0 12* 0 46         Results from last 7 days   Lab Units 12/01/22  0518 11/30/22  1316 11/29/22  0440 11/28/22  1405   GLUCOSE RANDOM mg/dL 89 100* 102* 90       Results from last 7 days   Lab Units 12/01/22  0518   PROTIME seconds 15 4*   INR  1 18         Results from last 7 days   Lab Units 11/28/22  1405   PROCALCITONIN ng/ml 0 38*     Results from last 7 days   Lab Units 11/28/22  1405   LACTIC ACID mmol/L 1 2     Results from last 7 days   Lab Units 11/28/22  1405   CRP mg/L 138 6*   SED RATE mm/hour 53*     Results from last 7 days   Lab Units 11/30/22  1446   AMPH/METH  Negative   BARBITURATE UR  Negative   BENZODIAZEPINE UR  Negative   COCAINE UR  Positive*   METHADONE URINE  Negative   OPIATE UR  Negative   PCP UR  Negative   THC UR  Positive*     Results from last 7 days   Lab Units 11/29/22  1432 11/28/22  1405   BLOOD CULTURE   --  No Growth at 48 hrs  No Growth at 48 hrs     GRAM STAIN RESULT  2+ Polys  No bacteria seen  --    WOUND CULTURE  Culture too young- will reincubate  --      Present on Admission:  • Abscess of arm, right  • Opioid use disorder, severe, dependence (HCC)  • Polysubstance abuse (HCC)      Admitting Diagnosis: Abscess of forearm [L02 419]  Pain [R52]  IVDU (intravenous drug user) [F19 90]  Abscess of arm, right [L02 413]  Known medical problems [Z78 9]  Age/Sex: 33 y o  male  Admission Orders:  NPO  -- Regular Diet  Scheduled Medications:  acetaminophen, 650 mg, Oral, Q6H ERIK  ketorolac, 15 mg, Intravenous, Q6H [facility] & half-way  nicotine, 1 patch, Transdermal, Daily  vancomycin, 15 mg/kg, Intravenous, Q12H    Continuous IV Infusions:  lactated ringers, 50 mL/hr, Intravenous, Continuous    PRN Meds:  fentaNYL, 50 mcg, Intravenous, Q5 Min PRN  HYDROmorphone, 0 5 mg, Intravenous; 11/30 x1  ondansetron, 4 mg, Intravenous, Once PRN  oxyCODONE, 5 mg, Oral, Q4H PRN        IP CONSULT TO PHARMACY  IP CONSULT TO ACUTE CARE SURGERY    Network Utilization Review Department  ATTENTION: Please call with any questions or concerns to [phone] and carefully listen to the prompts so that you are directed to the right person  All voicemails are confidential   Amy Daniel all requests for admission clinical reviews, approved or denied determinations and any other requests to dedicated fax number below belonging to the campus where the patient is receiving treatment   List of dedicated fax numbers for the Facilities:  [address] DENIALS (Administrative/Medical Necessity) [phone]   [address] (Maternity/NICU/Pediatrics) [phone]   915 [address] [phone]   [facility] [address] 77 [phone]   [address] [place] [address] Lita Spencer 28 [phone]   1554 [facility] [place] [facility] 134 815 [facility] [phone]

## 2022-12-01 NOTE — ANESTHESIA PREPROCEDURE EVALUATION
Procedure:  INCISION AND DRAINAGE (I&D) EXTREMITY (Right: Arm)    Relevant Problems   No relevant active problems        Physical Exam    Airway    Mallampati score: II         Dental       Cardiovascular  Cardiovascular exam normal    Pulmonary  Pulmonary exam normal     Other Findings        Anesthesia Plan  ASA Score- 3     Anesthesia Type- general with ASA Monitors  Additional Monitors:   Airway Plan: LMA  Comment: Pt last used cocaine 3 days ago as per history   Plan Factors-Exercise tolerance (METS): >4 METS  Chart reviewed  EKG reviewed  Existing labs reviewed  Patient summary reviewed  Patient is a current smoker  Patient instructed to abstain from smoking on day of procedure  Patient did not smoke on day of surgery  Induction- intravenous  Postoperative Plan- Plan for postoperative opioid use  Informed Consent- Anesthetic plan and risks discussed with patient  I personally reviewed this patient with the CRNA  Discussed and agreed on the Anesthesia Plan with the CRNA  Rosalina Console             No results found for: HGBA1C    Lab Results   Component Value Date    K 3 8 12/01/2022     12/01/2022    CO2 27 12/01/2022    BUN 15 12/01/2022    CREATININE 0 72 12/01/2022    GLUF 80 11/08/2022    CALCIUM 8 9 12/01/2022    CORRECTEDCA 9 5 12/01/2022    AST 38 12/01/2022    ALT 41 12/01/2022    ALKPHOS 72 12/01/2022    EGFR 128 12/01/2022       Lab Results   Component Value Date    WBC 10 83 (H) 12/01/2022    HGB 13 0 12/01/2022    HCT 40 2 12/01/2022    MCV 94 12/01/2022     12/01/2022     Narrative & Impression   Normal sinus rhythm  Normal ECG     Confirmed by Ayana Dumont ([id number]) on 11/7/2022 6:13:48 PM

## 2022-12-01 NOTE — DISCHARGE SUMMARY
[facility]  Discharge- Bobo Franz 1995, 33 y o  male MRN: [id number]  Unit/Bed#: 7T [facility] 709-01 Encounter: [id number]  Primary Care Provider: No primary care provider on file  Date and time admitted to hospital: 11/30/2022 12:18 PM    * Abscess of arm, right  Assessment & Plan  Patient with history IV drug use  Has known abscess in volar component of right forearm  MRSA swab negative    Patient underwent I&D today, afterwards decided to leave against medical advice  Patient will be discharged with course of doxycycline  Intraoperative culture results pending at time of discharge  Ambulatory Wound care referral placed  Patient signed AMA paperwork and understands and accepts risk of leaving against medical advice    Polysubstance abuse [facility])  Assessment & Plan  UDS positive for oxycodone, THC and cocaine  Received oxycodone during previous hospitalization      Opioid use disorder, severe, dependence ([address] )  Assessment & Plan  -will monitor for signs of withdrawal        Medical Problems     Resolved Problems  Date Reviewed: 12/1/2022   None       Discharging Physician / Practitioner: Chani Lucas DO  PCP: No primary care provider on file  Admission Date:   Admission Orders (From admission, onward)     Ordered        11/30/22 [address] [place] [address]                      Discharge Date: 12/01/22    Consultations During Hospital Stay:  · General surgery    Procedures Performed:   · I&D on 12/01    Significant Findings / Test Results:   No orders to display   ·         Test Results Pending at Discharge (will require follow up): · Intraoperative Gram stain and cultures pending     Outpatient Tests Requested:  · None    Complications:  None    Reason for Admission:  Right forearm abscess    Hospital Course:   Bobo Franz is a 33 y o  male patient who originally presented to the hospital on 11/30/2022 due to right forearm abscess    After leaving against medical advice on 12/30, patient return to ED for hours later and was readmitted  Patient was agreeable for surgery on 12/01  He underwent I&D on 12/01 and intraoperative cultures were taken  Patient decided to leave AMA upon return to medicine floor  AMA paperwork sign, patient understands except for risk of leaving early  Patient will be discharged course of doxycycline and given ambulatory referral to wound care  The patient, initially admitted to the hospital as inpatient, was discharged earlier than expected given the following: left AMA  Please see above list of diagnoses and related plan for additional information  Condition at Discharge: good      Discussion with Family: Patient declined call to   Discharge instructions/Information to patient and family:   See after visit summary for information provided to patient and family  Provisions for Follow-Up Care:  See after visit summary for information related to follow-up care and any pertinent home health orders  Disposition:   Left AMA    Planned Readmission: no     Discharge Statement:  I spent 43 minutes discharging the patient  This time was spent on the day of discharge  I had direct contact with the patient on the day of discharge  Greater than 50% of the total time was spent examining patient, answering all patient questions, arranging and discussing plan of care with patient as well as directly providing post-discharge instructions  Additional time then spent on discharge activities  Discharge Medications:  See after visit summary for reconciled discharge medications provided to patient and/or family        **Please Note: This note may have been constructed using a voice recognition system**

## 2022-12-01 NOTE — ANESTHESIA POSTPROCEDURE EVALUATION
Post-Op Assessment Note    CV Status:  Stable  Pain Score: 0    Pain management: adequate     Mental Status:  Alert and awake   Hydration Status:  Euvolemic   PONV Controlled:  Controlled   Airway Patency:  Patent      Post Op Vitals Reviewed: Yes      Staff: Anesthesiologist, CRNA         No notable events documented      BP   154/72   Temp  97    Pulse  105   Resp   14   SpO2   99

## 2022-12-02 NOTE — UTILIZATION REVIEW
NOTIFICATION OF ADMISSION DISCHARGE   This is a Notification of Discharge from [address]  Please be advised that this patient has been discharge from our facility  Below you will find the admission and discharge date and time including the patient’s disposition  UTILIZATION REVIEW CONTACT:  Jimmie Carmen MA  Utilization   Network Utilization Review Department  Phone: [phone] x carefully listen to the prompts  All voicemails are confidential   Email: [email or web address]     ADMISSION INFORMATION  PRESENTATION DATE: 11/30/2022 12:18 PM  OBERVATION ADMISSION DATE:   INPATIENT ADMISSION DATE: 11/30/22  3:39 PM   DISCHARGE DATE: 12/1/2022 11:58 AM   DISPOSITION:Left against medical advice or discontinued care    IMPORTANT INFORMATION:  Send all requests for admission clinical reviews, approved or denied determinations and any other requests to dedicated fax number below belonging to the campus where the patient is receiving treatment   List of dedicated fax numbers:  [address] DENIALS (Administrative/Medical Necessity) [phone]   [address] (Maternity/NICU/Pediatrics) [phone]   [facility] [phone]   [place] [address] [phone]   [address] [phone]   220 [facility] [phone]   90 [facility] [phone]   [facility]9 [facility] 119 [phone]   [facility]  [phone]   4054 [facility] [phone] 412 [facility] 850 E [facility] [phone]

## 2022-12-02 NOTE — UTILIZATION REVIEW
NOTIFICATION OF ADMISSION DISCHARGE   This is a Notification of Discharge from [facility]  Please be advised that this patient has been discharge from our facility  Below you will find the admission and discharge date and time including the patient’s disposition  UTILIZATION REVIEW CONTACT:  Gloria Bonner MA  Utilization   Network Utilization Review Department  Phone: [phone] x carefully listen to the prompts  All voicemails are confidential   Email: [email or web address]     ADMISSION INFORMATION  PRESENTATION DATE: 11/28/2022 12:39 PM  OBERVATION ADMISSION DATE:   INPATIENT ADMISSION DATE: 11/28/22  2:47 PM   DISCHARGE DATE: 11/30/2022  9:07 AM   DISPOSITION:Left against medical advice or discontinued care    IMPORTANT INFORMATION:  Send all requests for admission clinical reviews, approved or denied determinations and any other requests to dedicated fax number below belonging to the campus where the patient is receiving treatment   List of dedicated fax numbers:  [address] DENIALS (Administrative/Medical Necessity) [phone]   [address] (Maternity/NICU/Pediatrics) [phone]   [facility] [phone]   [facility] [phone]   [address] [phone]   220 [facility] [phone] 90 [facility] [phone]   [address] [place], [place] [address] 119 [phone]   [facility]  [phone]   4054 [facility] [phone]   412 [facility] 850 [facility] [phone]

## 2022-12-03 LAB
BACTERIA SPEC ANAEROBE CULT: NORMAL
BACTERIA TISS AEROBE CULT: ABNORMAL
GRAM STN SPEC: ABNORMAL
GRAM STN SPEC: ABNORMAL

## 2022-12-04 LAB
BACTERIA BLD CULT: NORMAL
BACTERIA BLD CULT: NORMAL

## 2023-01-11 NOTE — ED NOTES
Insurance Authorization for admission:  Phone call placed to [employer] number [id number]  Spoke to: Darell Dewitt 148   4 days approved  Level of care inpatient  Review on **  Authorization # receiving facility will call upon arrival     EVS (Eligibility verification system) mqqfge - [phone]   Automated system indicates eligible yes

## 2023-04-15 NOTE — CONSULTS
Consultation - General Surgery   Mukesh Leon 33 y o  male MRN: [id number]  Unit/Bed#: OR [place] Encounter: [id number]    Assessment/Plan     Assessment:  Right upper extremity abscess  Plan: Will proceed to the operating room for incision and drainage of right upper extremity abscess  Risks of procedure were explained and necessity of procedure emphasized  Consent obtained  Will need to follow-up with wound care postop  Follow-up cultures    History of Present Illness     HPI:  Mukesh Leon is a 33 y o  male initially seen on 11/29 for right upper extremity abscess that was partially drained on the medical floor  He was unable to tolerate further drainage so he was scheduled for incision and drainage in the operating room the following day however he left against medical advice prior to the procedure  He returned to the emergency room later in the day on 11/30  In the ER he had a leukocytosis of 16 and urine drug screen was positive for cocaine and THC  Consults    Review of Systems   Skin: Positive for color change and wound  All other systems reviewed and are negative  Historical Information   History reviewed  No pertinent past medical history  History reviewed  No pertinent surgical history    Social History   Social History     Substance and Sexual Activity   Alcohol Use Not Currently     Social History     Substance and Sexual Activity   Drug Use Yes   • Types: Marijuana, Cocaine, Heroin, Fentanyl     E-Cigarette/Vaping   • E-Cigarette Use Never User      E-Cigarette/Vaping Substances   • Nicotine No    • THC No    • CBD No    • Flavoring No    • Other No    • Unknown No      Social History     Tobacco Use   Smoking Status Every Day   • Packs/day: 0 50   • Types: Cigarettes   Smokeless Tobacco Never     Family History: non-contributory    Meds/Allergies   all current active meds have been reviewed  No Known Allergies    Objective   First Vitals:   Blood Pressure: 145/87 (11/30/22 1212)  Pulse: (!) 106 (11/30/22 1212)  Temperature: 98 9 °F (37 2 °C) (11/30/22 1212)  Temp Source: Oral (11/30/22 1212)  Respirations: 18 (11/30/22 1212)  Height: 5' 5" (165 1 cm) (11/30/22 1615)  Weight - Scale: 62 7 kg (138 lb 3 2 oz) (11/30/22 1212)  SpO2: 96 % (11/30/22 1212)    Current Vitals:   Blood Pressure: 120/72 (12/01/22 0743)  Pulse: 68 (12/01/22 0743)  Temperature: 98 4 °F (36 9 °C) (12/01/22 0743)  Temp Source: Temporal (12/01/22 0743)  Respirations: 19 (12/01/22 0743)  Height: 5' 5" (165 1 cm) (11/30/22 1615)  Weight - Scale: 61 2 kg (134 lb 14 7 oz) (11/30/22 1615)  SpO2: 100 % (12/01/22 0743)      Intake/Output Summary (Last 24 hours) at 12/1/2022 0858  Last data filed at 11/30/2022 2015  Gross per 24 hour   Intake 520 ml   Output --   Net 520 ml       Invasive Devices     Peripheral Intravenous Line  Duration           Peripheral IV 11/30/22 Left Antecubital <1 day                Physical Exam  Vitals reviewed  Constitutional:       General: He is not in acute distress  Appearance: Normal appearance  He is normal weight  HENT:      Head: Normocephalic and atraumatic  Nose: Nose normal       Mouth/Throat:      Mouth: Mucous membranes are moist       Pharynx: Oropharynx is clear  Eyes:      Extraocular Movements: Extraocular movements intact  Conjunctiva/sclera: Conjunctivae normal       Pupils: Pupils are equal, round, and reactive to light  Cardiovascular:      Rate and Rhythm: Regular rhythm  Tachycardia present  Heart sounds: Normal heart sounds  Pulmonary:      Effort: Pulmonary effort is normal       Breath sounds: Normal breath sounds  Abdominal:      General: Abdomen is flat  There is no distension  Palpations: Abdomen is soft  Tenderness: There is no abdominal tenderness  Musculoskeletal:         General: Swelling and tenderness present  Normal range of motion  Right forearm: Swelling and laceration present  Cervical back: Normal range of motion and neck supple  Comments: Incision and drainage site open, surrounding erythema and induration   Skin:     General: Skin is warm and dry  Neurological:      General: No focal deficit present  Mental Status: He is alert and oriented to person, place, and time  Lab Results:   I have personally reviewed pertinent lab results  , CBC:   Lab Results   Component Value Date    WBC 10 83 (H) 12/01/2022    HGB 13 0 12/01/2022    HCT 40 2 12/01/2022    MCV 94 12/01/2022     12/01/2022    MCH 30 4 12/01/2022    MCHC 32 3 12/01/2022    RDW 12 4 12/01/2022    MPV 10 9 12/01/2022    NRBC 0 12/01/2022   , CMP:   Lab Results   Component Value Date    SODIUM 136 12/01/2022    K 3 8 12/01/2022     12/01/2022    CO2 27 12/01/2022    BUN 15 12/01/2022    CREATININE 0 72 12/01/2022    CALCIUM 8 9 12/01/2022    AST 38 12/01/2022    ALT 41 12/01/2022    ALKPHOS 72 12/01/2022    EGFR 128 12/01/2022     Imaging: I have personally reviewed pertinent reports  and I have personally reviewed pertinent films in PACS  EKG, Pathology, and Other Studies: I have personally reviewed pertinent reports  36.5

## 2023-04-25 ENCOUNTER — HOSPITAL ENCOUNTER (EMERGENCY)
Facility: HOSPITAL | Age: 28
Discharge: HOME | End: 2023-04-25
Attending: EMERGENCY MEDICINE

## 2023-04-25 VITALS
HEART RATE: 97 BPM | SYSTOLIC BLOOD PRESSURE: 124 MMHG | TEMPERATURE: 98.1 F | RESPIRATION RATE: 20 BRPM | DIASTOLIC BLOOD PRESSURE: 67 MMHG | OXYGEN SATURATION: 95 %

## 2023-04-25 VITALS
HEART RATE: 98 BPM | SYSTOLIC BLOOD PRESSURE: 128 MMHG | DIASTOLIC BLOOD PRESSURE: 72 MMHG | RESPIRATION RATE: 16 BRPM | WEIGHT: 201.2 LBS | TEMPERATURE: 97.6 F | OXYGEN SATURATION: 95 % | BODY MASS INDEX: 33.48 KG/M2

## 2023-04-25 DIAGNOSIS — F11.10 HEROIN ABUSE (HCC): Primary | ICD-10-CM

## 2023-04-25 DIAGNOSIS — T40.2X1A OPIOID OVERDOSE (HCC): Primary | ICD-10-CM

## 2023-04-25 DIAGNOSIS — Z00.8 MEDICAL CLEARANCE FOR INCARCERATION: ICD-10-CM

## 2023-04-25 RX ORDER — NALOXONE HYDROCHLORIDE 1 MG/ML
2 INJECTION PARENTERAL ONCE
Status: COMPLETED | OUTPATIENT
Start: 2023-04-25 | End: 2023-04-25

## 2023-04-25 RX ORDER — NALOXONE HYDROCHLORIDE 4 MG/.1ML
SPRAY NASAL
Qty: 1 EACH | Refills: 0 | Status: SHIPPED | OUTPATIENT
Start: 2023-04-25 | End: 2024-04-24

## 2023-04-25 RX ORDER — NALOXONE HYDROCHLORIDE 1 MG/ML
INJECTION INTRAMUSCULAR; INTRAVENOUS; SUBCUTANEOUS
Status: COMPLETED
Start: 2023-04-25 | End: 2023-04-25

## 2023-04-25 RX ORDER — ONDANSETRON 2 MG/ML
4 INJECTION INTRAMUSCULAR; INTRAVENOUS ONCE
Status: COMPLETED | OUTPATIENT
Start: 2023-04-25 | End: 2023-04-25

## 2023-04-25 RX ORDER — NALOXONE HYDROCHLORIDE 1 MG/ML
2 INJECTION INTRAMUSCULAR; INTRAVENOUS; SUBCUTANEOUS ONCE
Status: COMPLETED | OUTPATIENT
Start: 2023-04-25 | End: 2023-04-25

## 2023-04-25 RX ORDER — KETOROLAC TROMETHAMINE 30 MG/ML
15 INJECTION, SOLUTION INTRAMUSCULAR; INTRAVENOUS ONCE
Status: COMPLETED | OUTPATIENT
Start: 2023-04-25 | End: 2023-04-25

## 2023-04-25 RX ORDER — ACETAMINOPHEN 325 MG/1
975 TABLET ORAL ONCE
Status: COMPLETED | OUTPATIENT
Start: 2023-04-25 | End: 2023-04-25

## 2023-04-25 RX ORDER — IBUPROFEN 600 MG/1
600 TABLET ORAL ONCE
Status: DISCONTINUED | OUTPATIENT
Start: 2023-04-25 | End: 2023-04-25

## 2023-04-25 RX ADMIN — NALOXONE HYDROCHLORIDE 2 MG: 1 INJECTION INTRAMUSCULAR; INTRAVENOUS; SUBCUTANEOUS at 11:20

## 2023-04-25 RX ADMIN — ONDANSETRON 4 MG: 2 INJECTION INTRAMUSCULAR; INTRAVENOUS at 14:41

## 2023-04-25 RX ADMIN — KETOROLAC TROMETHAMINE 15 MG: 30 INJECTION, SOLUTION INTRAMUSCULAR; INTRAVENOUS at 14:41

## 2023-04-25 RX ADMIN — ACETAMINOPHEN 975 MG: 325 TABLET ORAL at 11:21

## 2023-04-25 NOTE — ED PROVIDER NOTES
"History  Chief Complaint   Patient presents with    Heroin Overdose - Accidental     Possible - and unknown  Pt had been discharged to [facility] office  Was brought in since at [facility] he was drowsy and was given 4 mg Narcan  Pt states that the DID not take any drugs while in [facility]  Patient is a 52-year-old male coming in for potential overdose  Patient was seen earlier today after overdosing, and had received 4 mg of Narcan at the prior visit  Patient was ultimately discharged to police custody  Per EMS, when patient was at the [facility], he became [place] was given 4 mg of intranasal Narcan  EMS reports that they were not there when patient was \"unresponsive \"and no handover was given for patient transfer  Patient is in no acute distress at this time, denies taking anything additional after being given into police custody       used: Yes    Overdose - Accidental  Ingested substance:  Illicit drugs  Context: intentional ingestion    Associated symptoms: nausea and vomiting    Associated symptoms: no abdominal pain, no altered mental status and no shortness of breath        Prior to Admission Medications   Prescriptions Last Dose Informant Patient Reported? Taking?   naloxone (NARCAN) 4 mg/0 1 mL nasal spray   No No   Sig: Administer 1 spray into a nostril  If no response after 2-3 minutes, give another dose in the other nostril using a new spray  Facility-Administered Medications: None       History reviewed  No pertinent past medical history  Past Surgical History:   Procedure Laterality Date    INCISION AND DRAINAGE OF WOUND Right 12/1/2022    Procedure: INCISION AND DRAINAGE (I&D) EXTREMITY;  Surgeon: Koby Mondragon MD;  Location: [address] [place] [address] OR;  Service: General       History reviewed  No pertinent family history  I have reviewed and agree with the history as documented      E-Cigarette/Vaping    E-Cigarette Use Never User      E-Cigarette/Vaping Substances    " Nicotine No     THC No     CBD No     Flavoring No     Other No     Unknown No      Social History     Tobacco Use    Smoking status: Every Day     Packs/day: 0 50     Types: Cigarettes    Smokeless tobacco: Never   Vaping Use    Vaping Use: Never used   Substance Use Topics    Alcohol use: Not Currently    Drug use: Yes     Types: Marijuana, Cocaine, Heroin, Fentanyl       Review of Systems   Respiratory: Negative for shortness of breath  Gastrointestinal: Positive for nausea and vomiting  Negative for abdominal pain  Physical Exam  Physical Exam  Vitals reviewed  Constitutional:       Appearance: Normal appearance  He is normal weight  HENT:      Head: Normocephalic and atraumatic  Right Ear: External ear normal       Left Ear: External ear normal       Nose: Nose normal    Eyes:      Conjunctiva/sclera: Conjunctivae normal       Pupils: Pupils are equal, round, and reactive to light  Cardiovascular:      Rate and Rhythm: Normal rate  Pulmonary:      Effort: Pulmonary effort is normal    Musculoskeletal:         General: Normal range of motion  Cervical back: Normal range of motion  Skin:     General: Skin is warm and dry  Neurological:      Mental Status: He is alert           Vital Signs  ED Triage Vitals   Temperature Pulse Respirations Blood Pressure SpO2   04/25/23 1418 04/25/23 1418 04/25/23 1418 04/25/23 1418 04/25/23 1418   97 6 °F (36 4 °C) 100 20 148/86 97 %      Temp Source Heart Rate Source Patient Position - Orthostatic VS BP Location FiO2 (%)   04/25/23 1418 04/25/23 1418 04/25/23 1418 04/25/23 1418 --   Tympanic Monitor Sitting Left arm       Pain Score       04/25/23 1441       7           Vitals:    04/25/23 1418 04/25/23 1812   BP: 148/86 128/72   Pulse: 100 98   Patient Position - Orthostatic VS: Sitting Sitting         Visual Acuity      ED Medications  Medications   ketorolac (TORADOL) injection 15 mg (15 mg Intramuscular Given 4/25/23 1441) ondansetron [facility]) injection 4 mg (4 mg Intramuscular Given 4/25/23 1441)       Diagnostic Studies  Results Reviewed     None                 No orders to display              Procedures  Procedures         ED Course                               SBIRT 22yo+    Flowsheet Row Most Recent Value   Initial Alcohol Screen: US AUDIT-C     1  How often do you have a drink containing alcohol? 0 Filed at: 04/25/2023 1444   2  How many drinks containing alcohol do you have on a typical day you are drinking? 0 Filed at: 04/25/2023 1444   3a  Male UNDER 65: How often do you have five or more drinks on one occasion? 0 Filed at: 04/25/2023 1444   3b  FEMALE Any Age, or MALE 65+: How often do you have 4 or more drinks on one occassion? 0 Filed at: 04/25/2023 1444   Audit-C Score 0 Filed at: 04/25/2023 1444   CURTIS: How many times in the past year have you    Used an illegal drug or used a prescription medication for non-medical reasons? Never Filed at: 04/25/2023 1444                    Medical Decision Making  Patient is a 69-year-old male who came in for evaluation of an overdose earlier today, was a bounce back and received 4 mg of Narcan at the skilled nursing  Patient is in no acute distress at this time, does complain of nausea and vomiting  Patient denies using any additional drugs  Based on patient story, I suspect that patient had not fully cleared the opiate toxidrome from earlier today, and was just sleepy, as opposed to respiratory depressed  There is no sign off for medical staff to EMS, so unable to clarify further  Patient was watched for over 4 hours to ensure that there is no additional toxidrome  Discharged to police custody    Risk  Prescription drug management            Disposition  Final diagnoses:   Heroin abuse ([facility] [address] )   Medical clearance for incarceration     Time reflects when diagnosis was documented in both MDM as applicable and the Disposition within this note     Time User Action Codes Description Comment    4/25/2023  5:39 PM Shruthi Gongora Add [F11 10] Heroin abuse ([facility] [address] )     4/25/2023  5:40 PM Shruthi Gongora Add [Z00 8] Medical clearance for incarceration       ED Disposition     ED Disposition   Discharge    Condition   Stable    Date/Time   Tue Apr 25, 2023  5:40 PM    Comment   Efra Evangelistaeks discharge to home/self care  Follow-up Information     Follow up With Specialties Details Why Contact Info Additional [phone] [facility] Emergency Department Emergency Medicine  As needed, If symptoms worsen [address] [facility] [phone]  [facility]          Discharge Medication List as of 4/25/2023  5:42 PM      CONTINUE these medications which have NOT CHANGED    Details   naloxone (NARCAN) 4 mg/0 1 mL nasal spray Administer 1 spray into a nostril  If no response after 2-3 minutes, give another dose in the other nostril using a new spray , Normal             No discharge procedures on file      PDMP Review     None          ED Provider  Electronically Signed by           Reginaldo Lowe PA-C  04/26/23 9795

## 2023-04-25 NOTE — ED PROVIDER NOTES
History  Chief Complaint   Patient presents with    Overdose - Accidental     Pt snorted a bag of fentanyl today and was brought in by EMS  4mg Narcan given IN  67-year-old male with past medical history of opioid abuse presents today with EMS for suspected overdose  Patient states he was on his way to meet with his  when he found fentanyl in the street  Patient reports he snorted 1 bag of fentanyl today  EMS found patient minimally responsive on the street for which they administered 2 mg intranasal Narcan  On arrival to the emergency department, patient is awake alert and oriented and able to provide clinical history  At this time, patient was requesting medical detox  Patient states he was just in inpatient rehab for opioid abuse in [place] and was supposed to be discharged with suboxone but never received this prescription  States has not taken his suboxone in 2 days  Police arrived later and clarified that patient left rehab AMA  Denies suicidal ideations, homicidal ideations  Denies other drug or alcohol use  Overdose - Accidental  Associated symptoms: no abdominal pain, no chest pain, no cough, no shortness of breath and no vomiting        None       History reviewed  No pertinent past medical history  Past Surgical History:   Procedure Laterality Date    INCISION AND DRAINAGE OF WOUND Right 12/1/2022    Procedure: INCISION AND DRAINAGE (I&D) EXTREMITY;  Surgeon: Cheyanne Vásquez MD;  Location: [address] [place] [address] OR;  Service: General       History reviewed  No pertinent family history  I have reviewed and agree with the history as documented      E-Cigarette/Vaping    E-Cigarette Use Never User      E-Cigarette/Vaping Substances    Nicotine No     THC No     CBD No     Flavoring No     Other No     Unknown No      Social History     Tobacco Use    Smoking status: Every Day     Packs/day: 0 50     Types: Cigarettes    Smokeless tobacco: Never   Vaping Use    Vaping Use: Never used   Substance Use Topics    Alcohol use: Not Currently    Drug use: Yes     Types: Marijuana, Cocaine, Heroin, Fentanyl        Review of Systems   Constitutional: Negative for chills and fever  HENT: Negative for ear pain and sore throat  Eyes: Negative for pain and visual disturbance  Respiratory: Negative for cough and shortness of breath  Cardiovascular: Negative for chest pain and palpitations  Gastrointestinal: Negative for abdominal pain and vomiting  Genitourinary: Negative for dysuria and hematuria  Musculoskeletal: Negative for arthralgias and back pain  Skin: Negative for color change and rash  Neurological: Negative for seizures and syncope  All other systems reviewed and are negative  Physical Exam  ED Triage Vitals [04/25/23 1035]   Temperature Pulse Respirations Blood Pressure SpO2   98 1 °F (36 7 °C) 97 20 124/67 95 %      Temp Source Heart Rate Source Patient Position - Orthostatic VS BP Location FiO2 (%)   Tympanic Monitor Lying Left arm --      Pain Score       5             Orthostatic Vital Signs  Vitals:    04/25/23 1035   BP: 124/67   Pulse: 97   Patient Position - Orthostatic VS: Lying       Physical Exam  Vitals and nursing note reviewed  Constitutional:       General: He is not in acute distress  Appearance: Normal appearance  He is well-developed  He is not ill-appearing, toxic-appearing or diaphoretic  HENT:      Head: Normocephalic and atraumatic  Right Ear: External ear normal       Left Ear: External ear normal       Nose:      Right Nostril: No septal hematoma  Left Nostril: No septal hematoma  Comments: Dried blood around nares  Eyes:      Conjunctiva/sclera: Conjunctivae normal       Pupils: Pupils are equal, round, and reactive to light  Comments: Pupils 2 mm and reactive    Cardiovascular:      Rate and Rhythm: Normal rate and regular rhythm  Heart sounds: No murmur heard    Pulmonary:      Effort: Pulmonary effort is normal  No respiratory distress  Breath sounds: Normal breath sounds  Abdominal:      General: Abdomen is flat  Palpations: Abdomen is soft  Tenderness: There is no abdominal tenderness  There is no guarding or rebound  Musculoskeletal:         General: No swelling  Cervical back: Neck supple  Skin:     General: Skin is warm and dry  Capillary Refill: Capillary refill takes less than 2 seconds  Neurological:      General: No focal deficit present  Mental Status: He is alert and oriented to person, place, and time  Gait: Gait is intact  Psychiatric:         Mood and Affect: Mood normal          ED Medications  Medications   naloxone (FOR EMS ONLY) (NARCAN) 2 MG/2ML injection 4 mg (0 mg Does not apply Given to EMS 4/25/23 1033)   naloxone [facility]) intranasal 2 mg (2 mg Nasal Given 4/25/23 1120)   acetaminophen (TYLENOL) tablet 975 mg (975 mg Oral Given 4/25/23 1121)       Diagnostic Studies  Results Reviewed     None                 No orders to display         Procedures  Procedures      ED Course                                       Medical Decision Making  81-year-old male with past medical history of opioid abuse presents today with EMS for suspected overdose  DDx including but not limited to: intoxication, withdrawal, intentional overdose, accidental overdose, suicide attempt, substance abuse  Patient reports snorting one bag of Fentanyl for which he required 2 mg intranasal Narcan pre-hospital   Denies SI  During observation in the ED, patient would become hypoxic to mid 80s when he would fall asleep and became increasingly somnolent  Arouses to voice  Additional 2 mg intranasal Narcan was administered  Patient was observed for another hour following Narcan  No evidence of hypoxia  No longer somnolent  Patient sleeping in exam bed with oxygen saturation 95%  Patient medically cleared for discharge to police  Risk  OTC drugs    Prescription drug management  Disposition  Final diagnoses:   Opioid overdose ([address] )     Time reflects when diagnosis was documented in both MDM as applicable and the Disposition within this note     Time User Action Codes Description Comment    4/25/2023 12:17 PM Keith Nurse Add [H32 4W3P] Opioid overdose [facility])       ED Disposition     ED Disposition   Discharge    Condition   Stable    Date/Time   Tue Apr 25, 2023 12:17 PM    Comment   Justin Medellin discharge to home/self care  Follow-up Information     Follow up With Specialties Details Why Contact Info Additional [address] [facility] Emergency Department Emergency Medicine  As needed, If symptoms worsen [address] [facility] [phone] [address] [facility] Emergency Department          Discharge Medication List as of 4/25/2023 12:22 PM      START taking these medications    Details   naloxone (NARCAN) 4 mg/0 1 mL nasal spray Administer 1 spray into a nostril  If no response after 2-3 minutes, give another dose in the other nostril using a new spray , Normal           No discharge procedures on file  PDMP Review     None           ED Provider  Attending physically available and evaluated Meenakshimay Medellin  [facility] managed the patient along with the ED Attending      Electronically Signed by         Aida Henry MD  04/25/23 6165

## 2023-04-25 NOTE — ED NOTES
Pt resting in owusu bed with no s/s of distress observed   Pox 97%      Sol Yepez, STEVEN  04/25/23 3355

## 2023-04-25 NOTE — ED ATTENDING ATTESTATION
4/25/2023  IMagy DO, saw and evaluated the patient  I have discussed the patient with the resident/non-physician practitioner and agree with the resident's/non-physician practitioner's findings, Plan of Care, and MDM as documented in the resident's/non-physician practitioner's note, except where noted  All available labs and Radiology studies were reviewed  I was present for key portions of any procedure(s) performed by the resident/non-physician practitioner and I was immediately available to provide assistance  At this point I agree with the current assessment done in the Emergency Department  I have conducted an independent evaluation of this patient a history and physical is as follows:    ED Course      Patient is a 15-year-old male  Arrives via EMS for concerns of overdose  They received 2 mg of intranasal Narcan in the field  They arrive awake alert and oriented  Patient initially requesting to undergo testing in the emergency room for detox  They do wants to be evaluated for rehab  They are agreeable to being monitored in the emergency room for short period time  General: VSS, NAD  Head: NCAT, NT  Eyes: PERRL, EOMI    ENT: MMM, Pharynx normal    Neck: Trachea midline  No JVD  CV: RRR  No M/R/G  Lungs: CTAB,  No wheezes, rales  Abd: +BS, soft, NT/ND  No guarding/rebound   MSK: Full ROM B/L UE/LE  No lower extremity edema  Back: No C/T/L-spine tenderness  Skin: Dry, intact  No abrasions, lacerations   Neuro: AAOx3, GCS 15, CN II-XII grossly intact  Motor/sensory grossly intact  Patient appears to be hemodynamically stable on arrival, no evidence of airway compromise he is not somnolent  Will be monitored for continued sobriety and any need for repeat dosing of naloxone    Patient is [place]-speaking,  service was used, there is some difficulty in clarifying whether he wants renewal of his Suboxone prescription versus inpatient initiation for substance use disorder treatment  We will clarify with a new  prior to disposition of patient  Otherwise no suicidal or homicidal ideation      Police came to the ED and states patient has a warrant for his arrest   He was monitored for several hours without respiratory compromise after his second dose of naloxone  Now ambulating without difficulty, he states he no longer wants to go to detox given his pending incarceration  No grounds to hold him against as well  Patient was discharged into police custody  Aware he can return for substance use disorder treatment at any time once he is cleared from incarceration      Critical Care Time  Procedures

## 2024-03-05 ENCOUNTER — HOSPITAL ENCOUNTER (EMERGENCY)
Facility: HOSPITAL | Age: 29
Discharge: HOME | End: 2024-03-05
Attending: EMERGENCY MEDICINE | Admitting: EMERGENCY MEDICINE
Payer: COMMERCIAL

## 2024-03-05 VITALS
BODY MASS INDEX: 39.03 KG/M2 | SYSTOLIC BLOOD PRESSURE: 116 MMHG | HEART RATE: 60 BPM | RESPIRATION RATE: 16 BRPM | DIASTOLIC BLOOD PRESSURE: 65 MMHG | TEMPERATURE: 98.6 F | OXYGEN SATURATION: 96 % | WEIGHT: 234.57 LBS

## 2024-03-05 DIAGNOSIS — F19.10 SUBSTANCE ABUSE (HCC): Primary | ICD-10-CM

## 2024-03-05 LAB — GLUCOSE SERPL-MCNC: 129 MG/DL (ref 65–140)

## 2024-03-05 PROCEDURE — 82948 REAGENT STRIP/BLOOD GLUCOSE: CPT

## 2024-03-05 PROCEDURE — 99284 EMERGENCY DEPT VISIT MOD MDM: CPT

## 2024-03-05 PROCEDURE — 99283 EMERGENCY DEPT VISIT LOW MDM: CPT | Performed by: EMERGENCY MEDICINE

## 2024-03-05 NOTE — ED PROVIDER NOTES
History  Chief Complaint   Patient presents with    Recreational Drug Use     Arriving via ems after police were called d/t patient being found in the bushes of a neighbors house. Patient was found to have K2 and suboxone on him.      Patient is a 28-year-old male.  He does have a history of substance abuse.  He was found propped up against a wall near some bushes altered.  EMS was called.  He did not receive Narcan.  There is no history of trauma.  No history of fever.  Patient denies any drug or alcohol use today.  He reports he is on probation and takes urine tests.  EMS, however, found Suboxone and K2 on patient.  Review of old records shows a history of opiate abuse.        Prior to Admission Medications   Prescriptions Last Dose Informant Patient Reported? Taking?   naloxone (NARCAN) 4 mg/0.1 mL nasal spray   No No   Sig: Administer 1 spray into a nostril. If no response after 2-3 minutes, give another dose in the other nostril using a new spray.      Facility-Administered Medications: None       History reviewed. No pertinent past medical history.    Past Surgical History:   Procedure Laterality Date    INCISION AND DRAINAGE OF WOUND Right 12/1/2022    Procedure: INCISION AND DRAINAGE (I&D) EXTREMITY;  Surgeon: Michaela Ibarra MD;  Location: [facility] OR;  Service: General       History reviewed. No pertinent family history.  I have reviewed and agree with the history as documented.    E-Cigarette/Vaping    E-Cigarette Use Never User      E-Cigarette/Vaping Substances    Nicotine No     THC No     CBD No     Flavoring No     Other No     Unknown No      Social History     Tobacco Use    Smoking status: Every Day     Current packs/day: 0.50     Types: Cigarettes    Smokeless tobacco: Never   Vaping Use    Vaping status: Never Used   Substance Use Topics    Alcohol use: Not Currently    Drug use: Yes     Types: Marijuana, Cocaine, Heroin, Fentanyl       Review of Systems   Constitutional:  Negative for chills  and fever.   HENT:  Negative for rhinorrhea and sore throat.    Eyes:  Negative for pain, redness and visual disturbance.   Respiratory:  Negative for cough and shortness of breath.    Cardiovascular:  Negative for chest pain and leg swelling.   Gastrointestinal:  Negative for abdominal pain, diarrhea and vomiting.   Endocrine: Negative for polydipsia and polyuria.   Genitourinary:  Negative for dysuria, frequency and hematuria.   Musculoskeletal:  Negative for back pain and neck pain.   Skin:  Negative for rash and wound.   Allergic/Immunologic: Negative for immunocompromised state.   Neurological:  Negative for weakness, numbness and headaches.   Psychiatric/Behavioral:  Negative for hallucinations and suicidal ideas.        Physical Exam  Physical Exam  Vitals reviewed.   Constitutional:       General: He is not in acute distress.     Appearance: He is not toxic-appearing.   HENT:      Head: Normocephalic and atraumatic.      Nose: Nose normal.      Mouth/Throat:      Mouth: Mucous membranes are moist.   Eyes:      General:         Right eye: No discharge.         Left eye: No discharge.      Extraocular Movements: Extraocular movements intact.      Conjunctiva/sclera: Conjunctivae normal.      Pupils: Pupils are equal, round, and reactive to light.   Cardiovascular:      Rate and Rhythm: Normal rate and regular rhythm.      Pulses: Normal pulses.      Heart sounds: Normal heart sounds. No murmur heard.     No friction rub. No gallop.   Pulmonary:      Effort: Pulmonary effort is normal. No respiratory distress.      Breath sounds: Normal breath sounds. No stridor. No wheezing, rhonchi or rales.   Abdominal:      General: Bowel sounds are normal. There is no distension.      Palpations: Abdomen is soft.      Tenderness: There is no abdominal tenderness. There is no right CVA tenderness, left CVA tenderness, guarding or rebound.   Musculoskeletal:         General: No swelling, tenderness, deformity or signs of  injury. Normal range of motion.      Cervical back: Normal range of motion and neck supple. No rigidity.      Right lower leg: No edema.      Left lower leg: No edema.      Comments: No calf pain or unilateral leg swelling   Skin:     General: Skin is warm and dry.      Coloration: Skin is not jaundiced or pale.      Findings: No bruising, erythema or rash.   Neurological:      General: No focal deficit present.      Mental Status: He is alert and oriented to person, place, and time.      Cranial Nerves: No facial asymmetry.      Sensory: No sensory deficit.      Motor: Motor function is intact.   Psychiatric:         Mood and Affect: Mood normal.         Behavior: Behavior normal.         Vital Signs  ED Triage Vitals [03/05/24 1530]   Temperature Pulse Respirations Blood Pressure SpO2   98.6 °F (37 °C) 60 16 116/65 96 %      Temp Source Heart Rate Source Patient Position - Orthostatic VS BP Location FiO2 (%)   Tympanic Monitor Sitting Left arm --      Pain Score       --           Vitals:    03/05/24 1530   BP: 116/65   Pulse: 60   Patient Position - Orthostatic VS: Sitting         Visual Acuity      ED Medications  Medications   naloxone nasal- Given to patient by provider at discharge. (NARCAN) 4 mg/0.1 mL nasal spray 4 mg (0 mg Does not apply Hold 3/5/24 1558)       Diagnostic Studies  Results Reviewed       Procedure Component Value Units Date/Time    Fingerstick Glucose (POCT) [[id number]]  (Normal) Collected: 03/05/24 1545    Lab Status: Final result Updated: 03/05/24 1546     POC Glucose 129 mg/dl                    No orders to display              Procedures  Procedures         ED Course                               SBIRT 20yo+      Flowsheet Row Most Recent Value   Initial Alcohol Screen: US AUDIT-C     1. How often do you have a drink containing alcohol? 0 Filed at: 03/05/2024 1532   2. How many drinks containing alcohol do you have on a typical day you are drinking?  0 Filed at: 03/05/2024 1532   3a.  Male UNDER 65: How often do you have five or more drinks on one occasion? 0 Filed at: 03/05/2024 1532   Audit-C Score 0 Filed at: 03/05/2024 1532   CURTIS: How many times in the past year have you...    Used an illegal drug or used a prescription medication for non-medical reasons? Never Filed at: 03/05/2024 1532                      Medical Decision Making  Patient denies any drug or alcohol use today.  There is no evidence of trauma.  No signs or symptoms of infection.  Neck is supple.  This is not meningitis.  I do suspect this is drug use.  Probably K2.  Patient declines drug and alcohol referrals.  Nevertheless, did initiate warm handoff.  Community Narcan dispensed.  Patient, however, wanted to leave prior to completion of evaluation.  Patient is alert and oriented x 3.  He is ambulating fine.  Patient was discharged.    Amount and/or Complexity of Data Reviewed  Independent Historian: EMS  External Data Reviewed: notes.  Labs: ordered. Decision-making details documented in ED Course.     Details: There was no hypoglycemia    Risk  Prescription drug management.  Decision regarding hospitalization.             Disposition  Final diagnoses:   Substance abuse (HCC)     Time reflects when diagnosis was documented in both MDM as applicable and the Disposition within this note       Time User Action Codes Description Comment    3/5/2024  3:51 PM Jt Hogan Add [F19.10] Substance abuse (HCC)           ED Disposition       ED Disposition   Discharge    Condition   Stable    Date/Time   Tue Mar 5, 2024 1551    Comment   Stew Romerefyakov discharge to home/self care.                   Follow-up Information       Follow up With Specialties Details Why Contact [facility] 2nd Floor Family Medicine   [address] [facility] 2ND FLOOR  [facility] [address]  [phone]              Discharge Medication List as of 3/5/2024  3:52 PM        CONTINUE these medications which have NOT CHANGED    Details    naloxone (NARCAN) 4 mg/0.1 mL nasal spray Administer 1 spray into a nostril. If no response after 2-3 minutes, give another dose in the other nostril using a new spray., Normal             No discharge procedures on file.    PDMP Review       None            ED Provider  Electronically Signed by             Jt Hogan MD  03/05/24 2406

## 2024-03-06 NOTE — ED CARE HANDOFF
[facility] Warm Handoff Outcome Note    Patient name Stew Pruett  Location ED 03/ED 03 MRN [id number]  Age: 28 y.o.          Plan Type:  Warm Handoff                                                                                    Plan Date: 3/6/2024  Service:  ED Warm Handoff      Substance Use History:  K2/Suboxone    Warm Handoff Update:  Pt d/c from ED    Warm Handoff Outcome: Treatment Related Resources

## 2024-07-14 ENCOUNTER — HOSPITAL ENCOUNTER (EMERGENCY)
Facility: HOSPITAL | Age: 29
Discharge: HOME | End: 2024-07-14
Attending: EMERGENCY MEDICINE
Payer: COMMERCIAL

## 2024-07-14 VITALS
OXYGEN SATURATION: 99 % | BODY MASS INDEX: 30.4 KG/M2 | DIASTOLIC BLOOD PRESSURE: 99 MMHG | HEART RATE: 85 BPM | SYSTOLIC BLOOD PRESSURE: 155 MMHG | RESPIRATION RATE: 18 BRPM | WEIGHT: 182.7 LBS | TEMPERATURE: 98.4 F

## 2024-07-14 DIAGNOSIS — F19.10 SUBSTANCE ABUSE (HCC): Primary | ICD-10-CM

## 2024-07-14 PROCEDURE — 99283 EMERGENCY DEPT VISIT LOW MDM: CPT

## 2024-07-14 NOTE — DISCHARGE INSTRUCTIONS
If you decide that you want detox, come back to ED and we will help facilitate. Follow-up with PCP and return to ED for any worsening symptoms.

## 2024-07-14 NOTE — ED PROVIDER NOTES
"History  Chief Complaint   Patient presents with    Medical Problem     Pt states he ran out of suboxone several days ago. Has been smoking K2 and would like \"help such as rehab.\" Smoked K2 PTA.      Patient is a 28-year-old male with a past medical history of polysubstance substance abuse. Presents today requesting detox and states that he is \"desperate\" and \"looking for help.\" States that he was in [place] for rehab for heroin in March and was discharged on Suboxone. Was encouraged to follow-up but has not followed up with anyone. Last dose of Suboxone was 2 days ago. For the last 3 months he has been using K2 daily with the last use today. Has tried to stop using K2 but cannot. Now reports increased depression secondary to feeling dependent on K2. Denies SI/HI or visual/auditory hallucinations. Feels \"disoriented\" especially after taking K2. Occasional marijuana use but denies any other drugs.       Medical Problem  Associated symptoms: no abdominal pain, no chest pain, no diarrhea, no fever, no nausea, no shortness of breath and no vomiting        Prior to Admission Medications   Prescriptions Last Dose Informant Patient Reported? Taking?   naloxone (NARCAN) 4 mg/0.1 mL nasal spray   No No   Sig: Administer 1 spray into a nostril. If no response after 2-3 minutes, give another dose in the other nostril using a new spray.      Facility-Administered Medications: None       History reviewed. No pertinent past medical history.    Past Surgical History:   Procedure Laterality Date    INCISION AND DRAINAGE OF WOUND Right 12/1/2022    Procedure: INCISION AND DRAINAGE (I&D) EXTREMITY;  Surgeon: Michaela Ibarra MD;  Location:  MAIN OR;  Service: General       History reviewed. No pertinent family history.  I have reviewed and agree with the history as documented.    E-Cigarette/Vaping    E-Cigarette Use Never User      E-Cigarette/Vaping Substances    Nicotine No     THC No     CBD No     Flavoring No     Other No "     Unknown No      Social History     Tobacco Use    Smoking status: Every Day     Current packs/day: 0.50     Types: Cigarettes    Smokeless tobacco: Never   Vaping Use    Vaping status: Never Used   Substance Use Topics    Alcohol use: Yes     Comment: Occ    Drug use: Yes     Types: Marijuana     Comment: K2       Review of Systems   Constitutional:  Negative for chills and fever.   Respiratory:  Negative for chest tightness and shortness of breath.    Cardiovascular:  Negative for chest pain and palpitations.   Gastrointestinal:  Negative for abdominal pain, diarrhea, nausea and vomiting.   All other systems reviewed and are negative.      Physical Exam  Physical Exam  Vitals and nursing note reviewed.   Constitutional:       Appearance: Normal appearance.   HENT:      Head: Normocephalic and atraumatic.   Cardiovascular:      Heart sounds: Normal heart sounds.   Pulmonary:      Effort: Pulmonary effort is normal.      Breath sounds: Normal breath sounds.   Skin:     General: Skin is warm and dry.      Capillary Refill: Capillary refill takes less than 2 seconds.   Neurological:      General: No focal deficit present.      Mental Status: He is alert and oriented to person, place, and time.   Psychiatric:         Mood and Affect: Mood normal.         Behavior: Behavior normal.         Vital Signs  ED Triage Vitals [07/14/24 1615]   Temperature Pulse Respirations Blood Pressure SpO2   98.4 °F (36.9 °C) 85 18 155/99 99 %      Temp Source Heart Rate Source Patient Position - Orthostatic VS BP Location FiO2 (%)   Oral Monitor Sitting Left arm --      Pain Score       --           Vitals:    07/14/24 1615   BP: 155/99   Pulse: 85   Patient Position - Orthostatic VS: Sitting         Visual Acuity      ED Medications  Medications - No data to display    Diagnostic Studies  Results Reviewed       None                   No orders to display              Procedures  Procedures         ED Course  ED Course as of 07/14/24  "1953   Sun Jul 14, 2024   1751 Patient on phone with [facility]   1947 Patient states that he no longer wants to go to rehab. Discharge papers given. Patient encouraged to return if he wants treatment.                                  SBIRT 22yo+      Flowsheet Row Most Recent Value   Initial Alcohol Screen: US AUDIT-C     1. How often do you have a drink containing alcohol? 2 Filed at: 07/14/2024 1620   2. How many drinks containing alcohol do you have on a typical day you are drinking?  2 Filed at: 07/14/2024 1620   3a. Male UNDER 65: How often do you have five or more drinks on one occasion? 0 Filed at: 07/14/2024 1620   Audit-C Score 4 Filed at: 07/14/2024 1620   CURTIS: How many times in the past year have you...    Used an illegal drug or used a prescription medication for non-medical reasons? Daily or Almost Daily Filed at: 07/14/2024 1620   DAST-10: In the past 12 months...    1. Have you used drugs other than those required for medical reasons? 1 Filed at: 07/14/2024 1620   2. Do you use more than one drug at a time? 0 Filed at: 07/14/2024 1620   3. Have you had medical problems as a result of your drug use (e.g., memory loss, hepatitis, convulsions, bleeding, etc.)? 0 Filed at: 07/14/2024 1620   4. Have you had \"blackouts\" or \"flashbacks\" as a result of drug use?YesNo 0 Filed at: 07/14/2024 1620   5. Do you ever feel bad or guilty about your drug use? 1 Filed at: 07/14/2024 1620   6. Does your spouse (or parent) ever complain about your involvement with drugs? 1 Filed at: 07/14/2024 1620   7. Have you neglected your family because of your use of drugs? 1 Filed at: 07/14/2024 1620   8. Have you engaged in illegal activities in order to obtain drugs? 0 Filed at: 07/14/2024 1620   9. Have you ever experienced withdrawal symptoms (felt sick) when you stopped taking drugs? 1 Filed at: 07/14/2024 1620   10. Are you always able to stop using drugs when you want to? 1 Filed at: 07/14/2024 1620   DAST-10 Score 6 Filed at: " 07/14/2024 1620                      Medical Decision Making  This patient is a 28-year-old male requesting detox or rehab at this time.  Discussed with patient that a referral to [facility] will be placed and he will talk to them about the different options for treatment.  Patient agreeable. Patient spoke to [facility] and was desiring inpatient rehab. Shortly thereafter, patient requesting to go home and changed his mind about rehab. Discharge paperwork provided and patient advised to return if he desires further treatment.                  Disposition  Final diagnoses:   Substance abuse (HCC)     Time reflects when diagnosis was documented in both MDM as applicable and the Disposition within this note       Time User Action Codes Description Comment    7/14/2024  7:36 PM Shae Meneses Add [F19.10] Substance abuse (HCC)           ED Disposition       ED Disposition   Discharge    Condition   Stable    Date/Time   Sun Jul 14, 2024 1936    Comment   Stew Romerefortguerita discharge to home/self care.                   Follow-up Information       Follow up With Specialties Details Why Contact Info Additional Information    [facility] Family Medicine Call in 1 week  [address] [place] [address] [phone] [facility] [place], [address] [place] [address], [address] [phone]    [facility] Emergency Department Emergency Medicine  If symptoms worsen [address] [facility] [address] [phone] [facility] Emergency Department            Discharge Medication List as of 7/14/2024  7:38 PM        CONTINUE these medications which have NOT CHANGED    Details   naloxone (NARCAN) 4 mg/0.1 mL nasal spray Administer 1 spray into a nostril. If no response after 2-3 minutes, give another dose in the other nostril using a new spray., Normal             No  discharge procedures on file.    PDMP Review       None            ED Provider  Electronically Signed by             BEAU Alfred  07/14/24 1953

## 2024-07-15 ENCOUNTER — HOSPITAL ENCOUNTER (EMERGENCY)
Facility: HOSPITAL | Age: 29
Discharge: HOME | End: 2024-07-15
Attending: EMERGENCY MEDICINE | Admitting: EMERGENCY MEDICINE
Payer: COMMERCIAL

## 2024-07-15 VITALS
TEMPERATURE: 98.9 F | OXYGEN SATURATION: 95 % | BODY MASS INDEX: 30.34 KG/M2 | HEART RATE: 75 BPM | DIASTOLIC BLOOD PRESSURE: 96 MMHG | SYSTOLIC BLOOD PRESSURE: 145 MMHG | RESPIRATION RATE: 20 BRPM | WEIGHT: 182.3 LBS

## 2024-07-15 DIAGNOSIS — F19.10 SUBSTANCE ABUSE (HCC): Primary | ICD-10-CM

## 2024-07-15 LAB
AMPHETAMINES SERPL QL SCN: NEGATIVE
BARBITURATES UR QL: NEGATIVE
BENZODIAZ UR QL: NEGATIVE
COCAINE UR QL: NEGATIVE
ETHANOL EXG-MCNC: 0 MG/DL
FENTANYL UR QL SCN: NEGATIVE
HYDROCODONE UR QL SCN: NEGATIVE
METHADONE UR QL: NEGATIVE
OPIATES UR QL SCN: NEGATIVE
OXYCODONE+OXYMORPHONE UR QL SCN: NEGATIVE
PCP UR QL: NEGATIVE
THC UR QL: POSITIVE

## 2024-07-15 PROCEDURE — 80307 DRUG TEST PRSMV CHEM ANLYZR: CPT | Performed by: EMERGENCY MEDICINE

## 2024-07-15 PROCEDURE — 99283 EMERGENCY DEPT VISIT LOW MDM: CPT

## 2024-07-15 PROCEDURE — 99284 EMERGENCY DEPT VISIT MOD MDM: CPT

## 2024-07-15 PROCEDURE — 82075 ASSAY OF BREATH ETHANOL: CPT | Performed by: EMERGENCY MEDICINE

## 2024-07-15 NOTE — ED CARE HANDOFF
[facility] Warm Handoff Outcome Note    Patient name Stew Pruett  Location [facility] Pool Room/[facility] MRN [id number]  Age: 28 y.o.          Plan Type:  Warm Handoff                                                                                    Plan Date: 7/15/2024  Service:  ED Warm Handoff      Substance Use History:  K2    Warm Handoff Update:  Pt accepted bed at [facility]    Warm Handoff Outcome: Residential  Inpatient

## 2024-07-15 NOTE — ED NOTES
[facility] called with update. Pt accepted at [facility] and [facility] being set up. Pt agreeable to transport.      Fela Zavala RN  07/15/24 0511

## 2024-07-15 NOTE — ED PROVIDER NOTES
History  Chief Complaint   Patient presents with    Detox Evaluation     Pt with hx of cocaine and heroin abuse ran out of suboxone 4 days ago. Pt now smokes K2 and wants to detox. [facility] yesterday [facility]  but [facility] out of [facility] house and currently no phone or place to life.      28-year-old male presents to ED for detox evaluation.  Patient was seen in the ED earlier in the evening for same reason.  Patient left to go to his sister's house to wait for [facility] phone call.  Patient returns as he was unable to get into his sister's house.  Patient homeless.  Patient tearful stating that he wants rehab.  States that he has been dealing with a difficult K2 use disorder.  Last use of K2 was yesterday.  Denies using any other substances.  Previously was dealing with cocaine, opioid addiction but has not used this in many months.  Was on Suboxone treatment but has not had his dose for 4 days.  No other concerns today.  Denies suicidal or homicidal ideation        Prior to Admission Medications   Prescriptions Last Dose Informant Patient Reported? Taking?   naloxone (NARCAN) 4 mg/0.1 mL nasal spray   No No   Sig: Administer 1 spray into a nostril. If no response after 2-3 minutes, give another dose in the other nostril using a new spray.      Facility-Administered Medications: None       History reviewed. No pertinent past medical history.    Past Surgical History:   Procedure Laterality Date    INCISION AND DRAINAGE OF WOUND Right 12/1/2022    Procedure: INCISION AND DRAINAGE (I&D) EXTREMITY;  Surgeon: Michaela Ibarra MD;  Location:  MAIN OR;  Service: General       History reviewed. No pertinent family history.  I have reviewed and agree with the history as documented.    E-Cigarette/Vaping    E-Cigarette Use Never User      E-Cigarette/Vaping Substances    Nicotine No     THC No     CBD No     Flavoring No     Other No     Unknown No      Social History      Tobacco Use    Smoking status: Every Day     Current packs/day: 0.50     Types: Cigarettes    Smokeless tobacco: Never   Vaping Use    Vaping status: Never Used   Substance Use Topics    Alcohol use: Yes     Comment: Occ    Drug use: Yes     Types: Marijuana     Comment: K2. hx cocaine and heroin       Review of Systems   Constitutional:         Positive detox evaluation   Psychiatric/Behavioral:  Negative for self-injury and suicidal ideas.    All other systems reviewed and are negative.      Physical Exam  Physical Exam  Vitals and nursing note reviewed.   Constitutional:       General: He is not in acute distress.     Appearance: Normal appearance. He is well-developed. He is not toxic-appearing or diaphoretic.      Comments: Patient tearful.   HENT:      Head: Normocephalic and atraumatic.   Eyes:      General: No scleral icterus.        Right eye: No discharge.         Left eye: No discharge.      Extraocular Movements: Extraocular movements intact.      Conjunctiva/sclera: Conjunctivae normal.      Pupils: Pupils are equal, round, and reactive to light.   Cardiovascular:      Rate and Rhythm: Normal rate and regular rhythm.      Pulses: Normal pulses.      Heart sounds: Normal heart sounds. No murmur heard.     No friction rub. No gallop.   Pulmonary:      Effort: Pulmonary effort is normal. No respiratory distress.      Breath sounds: Normal breath sounds. No stridor. No wheezing, rhonchi or rales.   Abdominal:      Palpations: Abdomen is soft.      Tenderness: There is no abdominal tenderness.   Musculoskeletal:         General: No swelling.      Cervical back: Neck supple.   Skin:     General: Skin is warm and dry.      Capillary Refill: Capillary refill takes less than 2 seconds.   Neurological:      Mental Status: He is alert.   Psychiatric:         Mood and Affect: Mood normal.         Vital Signs  ED Triage Vitals [07/15/24 0217]   Temperature Pulse Respirations Blood Pressure SpO2   98.9 °F (37.2  °C) 75 20 145/96 95 %      Temp Source Heart Rate Source Patient Position - Orthostatic VS BP Location FiO2 (%)   Tympanic Monitor Sitting Left arm --      Pain Score       --           Vitals:    07/15/24 0217   BP: 145/96   Pulse: 75   Patient Position - Orthostatic VS: Sitting         Visual Acuity      ED Medications  Medications - No data to display    Diagnostic Studies  Results Reviewed       Procedure Component Value Units Date/Time    Rapid drug screen, urine [[id number]]  (Abnormal) Collected: 07/15/24 0330    Lab Status: Final result Specimen: Urine, Other Updated: 07/15/24 0401     Amph/Meth UR Negative     Barbiturate Ur Negative     Benzodiazepine Urine Negative     Cocaine Urine Negative     Methadone Urine Negative     Opiate Urine Negative     PCP Ur Negative     THC Urine Positive     Oxycodone Urine Negative     Fentanyl Urine Negative     HYDROCODONE URINE Negative    Narrative:      Presumptive report. If requested, specimen will be sent to reference lab for confirmation.  FOR MEDICAL PURPOSES ONLY.   IF CONFIRMATION NEEDED PLEASE CONTACT THE LAB WITHIN 5 DAYS.    Drug Screen Cutoff Levels:  AMPHETAMINE/METHAMPHETAMINES  1000 ng/mL  BARBITURATES     200 ng/mL  BENZODIAZEPINES     200 ng/mL  COCAINE      300 ng/mL  METHADONE      300 ng/mL  OPIATES      300 ng/mL  PHENCYCLIDINE     25 ng/mL  THC       50 ng/mL  OXYCODONE      100 ng/mL  FENTANYL      5 ng/mL  HYDROCODONE     300 ng/mL    POCT alcohol breath test [[id number]]  (Normal) Resulted: 07/15/24 0328    Lab Status: Final result Updated: 07/15/24 0328     EXTBreath Alcohol 0.000                   No orders to display              Procedures  Procedures         ED Course                                 SBIRT 22yo+      Flowsheet Row Most Recent Value   Initial Alcohol Screen: US AUDIT-C     1. How often do you have a drink containing alcohol? 2 Filed at: 07/15/2024 0222   2. How many drinks containing alcohol do you have on a typical day you  "are drinking?  0 Filed at: 07/15/2024 0222   3a. Male UNDER 65: How often do you have five or more drinks on one occasion? 0 Filed at: 07/15/2024 0222   3b. FEMALE Any Age, or MALE 65+: How often do you have 4 or more drinks on one occassion? 0 Filed at: 07/15/2024 0222   Audit-C Score 2 Filed at: 07/15/2024 0222   CURTIS: How many times in the past year have you...    Used an illegal drug or used a prescription medication for non-medical reasons? Daily or Almost Daily Filed at: 07/15/2024 0222   DAST-10: In the past 12 months...    1. Have you used drugs other than those required for medical reasons? 1 Filed at: 07/15/2024 0222   2. Do you use more than one drug at a time? 0 Filed at: 07/15/2024 0222   3. Have you had medical problems as a result of your drug use (e.g., memory loss, hepatitis, convulsions, bleeding, etc.)? 1 Filed at: 07/15/2024 0222   4. Have you had \"blackouts\" or \"flashbacks\" as a result of drug use?YesNo 1 Filed at: 07/15/2024 0222   5. Do you ever feel bad or guilty about your drug use? 1 Filed at: 07/15/2024 0222   6. Does your spouse (or parent) ever complain about your involvement with drugs? 1 Filed at: 07/15/2024 0222   7. Have you neglected your family because of your use of drugs? 1 Filed at: 07/15/2024 0222   8. Have you engaged in illegal activities in order to obtain drugs? 1 Filed at: 07/15/2024 0222   9. Have you ever experienced withdrawal symptoms (felt sick) when you stopped taking drugs? 1 Filed at: 07/15/2024 0222   10. Are you always able to stop using drugs when you want to? 1 Filed at: 07/15/2024 0222   DAST-10 Score 9 Filed at: 07/15/2024 0222                      Medical Decision Making  28-year-old male presents to ED for detox evaluation as above.  On physical examination vital signs stable.  Reassuring examination.  Plan to obtain drug screen, EVIE.  Consult to substance use specialist.  Warm handoff order placed.    Patient monitored in the ED overnight.  Remained " stable.  Patient accepted at local rehabilitation facility.  Patient transported to rehabilitation facility.     Amount and/or Complexity of Data Reviewed  Labs: ordered.                 Disposition  Final diagnoses:   Substance abuse (HCC)     Time reflects when diagnosis was documented in both MDM as applicable and the Disposition within this note       Time User Action Codes Description Comment    7/15/2024  6:10 AM Vadim Moreno Add [F19.10] Substance abuse (HCC)           ED Disposition       ED Disposition   Discharge    Condition   Stable    Date/Time   Mon Jul 15, 2024 0630    Comment   Stew Rutherfordortguerita discharge to home/self care.                   Follow-up Information    None         Patient's Medications   Discharge Prescriptions    No medications on file       No discharge procedures on file.    PDMP Review       None            ED Provider  Electronically Signed by             Vadim Moreno PA-C  07/15/24 2428

## 2024-07-24 ENCOUNTER — HOSPITAL ENCOUNTER (EMERGENCY)
Facility: HOSPITAL | Age: 29
Discharge: HOME | End: 2024-07-25
Attending: EMERGENCY MEDICINE
Payer: COMMERCIAL

## 2024-07-24 DIAGNOSIS — F19.90 SUBSTANCE USE DISORDER: Primary | ICD-10-CM

## 2024-07-24 LAB — ETHANOL EXG-MCNC: 0 MG/DL

## 2024-07-24 PROCEDURE — 99284 EMERGENCY DEPT VISIT MOD MDM: CPT

## 2024-07-24 PROCEDURE — 82075 ASSAY OF BREATH ETHANOL: CPT

## 2024-07-24 PROCEDURE — 99283 EMERGENCY DEPT VISIT LOW MDM: CPT

## 2024-07-24 RX ORDER — ESCITALOPRAM OXALATE 10 MG/1
10 TABLET ORAL DAILY
Status: DISCONTINUED | OUTPATIENT
Start: 2024-07-25 | End: 2024-07-24

## 2024-07-24 RX ORDER — ESCITALOPRAM OXALATE 10 MG/1
10 TABLET ORAL DAILY
Status: DISCONTINUED | OUTPATIENT
Start: 2024-07-25 | End: 2024-07-25 | Stop reason: HOSPADM

## 2024-07-24 RX ORDER — FLUOXETINE 10 MG/1
10 CAPSULE ORAL DAILY
Status: DISCONTINUED | OUTPATIENT
Start: 2024-07-25 | End: 2024-07-25 | Stop reason: HOSPADM

## 2024-07-25 VITALS
RESPIRATION RATE: 14 BRPM | TEMPERATURE: 98.1 F | SYSTOLIC BLOOD PRESSURE: 113 MMHG | WEIGHT: 180.12 LBS | OXYGEN SATURATION: 97 % | HEART RATE: 71 BPM | DIASTOLIC BLOOD PRESSURE: 65 MMHG | BODY MASS INDEX: 29.97 KG/M2

## 2024-07-25 LAB
AMPHETAMINES SERPL QL SCN: NEGATIVE
BARBITURATES UR QL: NEGATIVE
BENZODIAZ UR QL: NEGATIVE
COCAINE UR QL: NEGATIVE
FENTANYL UR QL SCN: NEGATIVE
HYDROCODONE UR QL SCN: NEGATIVE
METHADONE UR QL: NEGATIVE
OPIATES UR QL SCN: NEGATIVE
OXYCODONE+OXYMORPHONE UR QL SCN: NEGATIVE
PCP UR QL: NEGATIVE
THC UR QL: POSITIVE

## 2024-07-25 PROCEDURE — 80307 DRUG TEST PRSMV CHEM ANLYZR: CPT

## 2024-07-25 RX ORDER — BUPRENORPHINE AND NALOXONE 2; .5 MG/1; MG/1
4 FILM, SOLUBLE BUCCAL; SUBLINGUAL DAILY
Status: DISCONTINUED | OUTPATIENT
Start: 2024-07-25 | End: 2024-07-25 | Stop reason: HOSPADM

## 2024-07-25 RX ADMIN — BUPRENORPHINE AND NALOXONE 4 MG: 2; .5 FILM BUCCAL; SUBLINGUAL at 09:03

## 2024-07-25 RX ADMIN — FLUOXETINE 10 MG: 10 CAPSULE ORAL at 09:02
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
## 2024-07-25 NOTE — ED PROVIDER NOTES
"History  Chief Complaint   Patient presents with    Detox Evaluation     Requesting detox from [facility] and states \"I have nowhere to go.\" Last use was today.      The patient is a 22-year-old male with a past medical history of opioid use disorder on Suboxone, K2 use, and depression, who presents for detox evaluation.  He was seen in the department on 7/15/2024, requesting rehab for his K2 use.  The patient was transferred to [facility], however he left the facility 3 days ago.  Per the patient none of the staff spoke Estonian and he did not find any of the therapy helpful as he was unable to communicate with everyone else.  At this time he reports continued K2 use.  He smokes 2-3 times a day and he last smoked several hours ago.  No cocaine or heroin use.  The patient does also report running out of his suboxone yesterday.  While at the facility he was started on Prozac and Lexapro for his depression and he would like to continue these medications.  No SI, HI, or hallucinations.        Prior to Admission Medications   Prescriptions Last Dose Informant Patient Reported? Taking?   naloxone (NARCAN) 4 mg/0.1 mL nasal spray   No No   Sig: Administer 1 spray into a nostril. If no response after 2-3 minutes, give another dose in the other nostril using a new spray.      Facility-Administered Medications: None       History reviewed. No pertinent past medical history.    Past Surgical History:   Procedure Laterality Date    INCISION AND DRAINAGE OF WOUND Right 12/1/2022    Procedure: INCISION AND DRAINAGE (I&D) EXTREMITY;  Surgeon: Michaela Ibarra MD;  Location:  MAIN OR;  Service: General       History reviewed. No pertinent family history.  I have reviewed and agree with the history as documented.    E-Cigarette/Vaping    E-Cigarette Use Never User      E-Cigarette/Vaping Substances    Nicotine No     THC No     CBD No     Flavoring No     Other No     Unknown No      Social History     Tobacco Use    Smoking " status: Every Day     Current packs/day: 0.50     Types: Cigarettes    Smokeless tobacco: Never   Vaping Use    Vaping status: Never Used   Substance Use Topics    Alcohol use: Yes     Comment: Occ    Drug use: Yes     Types: Marijuana     Comment: K2. hx cocaine and heroin       Review of Systems   Constitutional:  Negative for chills and fever.   HENT:  Negative for congestion, ear pain, rhinorrhea and sore throat.    Eyes:  Negative for pain and visual disturbance.   Respiratory:  Negative for cough and shortness of breath.    Cardiovascular:  Negative for chest pain and palpitations.   Gastrointestinal:  Negative for abdominal pain, diarrhea, nausea and vomiting.   Genitourinary:  Negative for dysuria and hematuria.   Musculoskeletal:  Negative for arthralgias, back pain and myalgias.   Skin:  Negative for color change and rash.   Neurological:  Negative for dizziness, seizures, syncope, weakness, light-headedness, numbness and headaches.   Psychiatric/Behavioral:  Positive for dysphoric mood. Negative for hallucinations, sleep disturbance and suicidal ideas. The patient is not nervous/anxious.    All other systems reviewed and are negative.      Physical Exam  Physical Exam  Vitals and nursing note reviewed.   Constitutional:       General: He is awake. He is not in acute distress.     Appearance: Normal appearance. He is well-developed and well-groomed. He is not toxic-appearing or diaphoretic.   HENT:      Head: Normocephalic and atraumatic.      Right Ear: External ear normal.      Left Ear: External ear normal.      Nose: Nose normal.      Mouth/Throat:      Lips: Pink.      Mouth: Mucous membranes are moist.      Pharynx: Oropharynx is clear. Uvula midline.   Eyes:      General: Lids are normal. Vision grossly intact. Gaze aligned appropriately.      Conjunctiva/sclera: Conjunctivae normal.      Pupils: Pupils are equal, round, and reactive to light.   Cardiovascular:      Rate and Rhythm: Regular rhythm.  Tachycardia present.   Pulmonary:      Effort: Pulmonary effort is normal. No respiratory distress.   Abdominal:      General: Abdomen is flat.      Palpations: Abdomen is soft.   Musculoskeletal:      Cervical back: Normal, full passive range of motion without pain and neck supple.      Thoracic back: Normal.      Lumbar back: Normal.   Lymphadenopathy:      Cervical: No cervical adenopathy.   Skin:     General: Skin is warm.      Capillary Refill: Capillary refill takes less than 2 seconds.      Coloration: Skin is not pale.      Findings: No abrasion, bruising, ecchymosis, erythema, petechiae, rash or wound.   Neurological:      Mental Status: He is alert and oriented to person, place, and time.   Psychiatric:         Attention and Perception: Attention and perception normal.         Mood and Affect: Affect normal. Mood is depressed.         Speech: Speech normal.         Behavior: Behavior normal. Behavior is cooperative.         Thought Content: Thought content normal. Thought content is not paranoid or delusional. Thought content does not include homicidal or suicidal ideation.         Vital Signs  ED Triage Vitals [07/24/24 2100]   Temperature Pulse Respirations Blood Pressure SpO2   98.8 °F (37.1 °C) (!) 120 22 161/92 94 %      Temp Source Heart Rate Source Patient Position - Orthostatic VS BP Location FiO2 (%)   Oral Monitor Sitting Left arm --      Pain Score       --           Vitals:    07/24/24 2100 07/25/24 0541   BP: 161/92 122/69   Pulse: (!) 120 74   Patient Position - Orthostatic VS: Sitting Lying         Visual Acuity  Visual Acuity      Flowsheet Row Most Recent Value   L Pupil Size (mm) 3   R Pupil Size (mm) 3            ED Medications  Medications   FLUoxetine (PROzac) capsule 10 mg (has no administration in time range)   escitalopram (LEXAPRO) tablet 10 mg (has no administration in time range)   buprenorphine-naloxone (Suboxone) film 4 mg (has no administration in time range)       Diagnostic  Studies  Results Reviewed       Procedure Component Value Units Date/Time    Rapid drug screen, urine [[id number]]  (Abnormal) Collected: 07/25/24 0422    Lab Status: Final result Specimen: Urine, Other Updated: 07/25/24 0440     Amph/Meth UR Negative     Barbiturate Ur Negative     Benzodiazepine Urine Negative     Cocaine Urine Negative     Methadone Urine Negative     Opiate Urine Negative     PCP Ur Negative     THC Urine Positive     Oxycodone Urine Negative     Fentanyl Urine Negative     HYDROCODONE URINE Negative    Narrative:      Presumptive report. If requested, specimen will be sent to reference lab for confirmation.  FOR MEDICAL PURPOSES ONLY.   IF CONFIRMATION NEEDED PLEASE CONTACT THE LAB WITHIN 5 DAYS.    Drug Screen Cutoff Levels:  AMPHETAMINE/METHAMPHETAMINES  1000 ng/mL  BARBITURATES     200 ng/mL  BENZODIAZEPINES     200 ng/mL  COCAINE      300 ng/mL  METHADONE      300 ng/mL  OPIATES      300 ng/mL  PHENCYCLIDINE     25 ng/mL  THC       50 ng/mL  OXYCODONE      100 ng/mL  FENTANYL      5 ng/mL  HYDROCODONE     300 ng/mL    POCT alcohol breath test [[id number]]  (Normal) Resulted: 07/24/24 2235    Lab Status: Final result Updated: 07/24/24 2235     EXTBreath Alcohol 0.000                   No orders to display              Procedures  Procedures         ED Course  ED Course as of 07/25/24 0725   Thu Jul 25, 2024   0526 THC URINE(!): Positive           SBIRT (Z71.41, Z71.51):  Screening: I have reviewed and agree with the nursing documentation below.  Brief Intervention: gave feedback about screening results, impairment, and risks while clarifying the findings, informed the patient about safe consumption limits and offered advice about change, and assessed the patient's readiness to change  Referral to Treatment: In Process  Time spent with patient for SBIRT: 13 minutes      SBIRT 22yo+      Flowsheet Row Most Recent Value   Initial Alcohol Screen: US AUDIT-C     1. How often do you have a drink  "containing alcohol? 0 Filed at: 07/24/2024 2101   2. How many drinks containing alcohol do you have on a typical day you are drinking?  0 Filed at: 07/24/2024 2101   3a. Male UNDER 65: How often do you have five or more drinks on one occasion? 0 Filed at: 07/24/2024 2101   3b. FEMALE Any Age, or MALE 65+: How often do you have 4 or more drinks on one occassion? 0 Filed at: 07/24/2024 2101   Audit-C Score 0 Filed at: 07/24/2024 2101   CURTIS: How many times in the past year have you...    Used an illegal drug or used a prescription medication for non-medical reasons? Daily or Almost Daily Filed at: 07/24/2024 2101   DAST-10: In the past 12 months...    1. Have you used drugs other than those required for medical reasons? 0 Filed at: 07/24/2024 2131   2. Do you use more than one drug at a time? 1 Filed at: 07/24/2024 2131   3. Have you had medical problems as a result of your drug use (e.g., memory loss, hepatitis, convulsions, bleeding, etc.)? 1 Filed at: 07/24/2024 2131   4. Have you had \"blackouts\" or \"flashbacks\" as a result of drug use?YesNo 0 Filed at: 07/24/2024 2131   5. Do you ever feel bad or guilty about your drug use? 1 Filed at: 07/24/2024 2131   6. Does your spouse (or parent) ever complain about your involvement with drugs? 1 Filed at: 07/24/2024 2131   7. Have you neglected your family because of your use of drugs? 1 Filed at: 07/24/2024 2131   8. Have you engaged in illegal activities in order to obtain drugs? 1 Filed at: 07/24/2024 2131   9. Have you ever experienced withdrawal symptoms (felt sick) when you stopped taking drugs? 1 Filed at: 07/24/2024 2131   10. Are you always able to stop using drugs when you want to? 0 Filed at: 07/24/2024 2131   DAST-10 Score 7 Filed at: 07/24/2024 2131              Medical Decision Making  Patient presents requesting rehab for K2 use.  Differential diagnosis includes but is not limited to K2 use disorder or polysubstance use disorder.  No other illicit substance " noted on the UDS.  Patient has no physical complaints and vitals are stable.  He is medically cleared for rehab placement.  Referrals for [facility] and the certified  were placed.  Patient signed out to Dr. Carmichael for final disposition pending [facility] placement.    Problems Addressed:  Substance use disorder: acute illness or injury    Amount and/or Complexity of Data Reviewed  Labs: ordered. Decision-making details documented in ED Course.    Risk  Prescription drug management.          Disposition  Final diagnoses:   Substance use disorder     Time reflects when diagnosis was documented in both MDM as applicable and the Disposition within this note       Time User Action Codes Description Comment    7/24/2024  9:47 PM Ashly Sanchez Add [F19.90] Substance use disorder           ED Disposition       None          Follow-up Information    None         Patient's Medications   Discharge Prescriptions    No medications on file       No discharge procedures on file.    PDMP Review       None            ED Provider  Electronically Signed by             Ashly Sanchez PA-C  07/25/24 0793

## 2024-07-25 NOTE — ED CARE HANDOFF
[facility] Warm Handoff Outcome Note    Patient name Stew Pruett  Location ED 08/ED 08 MRN [id number]  Age: 28 y.o.          Plan Type:  Warm Handoff                                                                                    Plan Date: 7/25/2024  Service:  ED Warm Handoff      Substance Use History:  K2  Warm Handoff Update:  Cl declined placement that [facility] offered    Warm Handoff Outcome: Patient Refused

## 2024-07-25 NOTE — ED NOTES
This RN called [facility] for an update and was told that they were still searching for placement. They stated that there would be a further update at 0800.      Unique Major RN  07/25/24 1002

## 2024-07-25 NOTE — ED NOTES
Patient resting in bed with no complaints or needs at this time     Unique Major, RN  07/24/24 9989

## 2024-07-25 NOTE — DISCHARGE INSTR - OTHER ORDERS
[facility]  [address]  [place] [address]  [phone]    JACQUELYN  [address] [facility] [place] [address]  [phone]    [facility]   [address] [facility] [place] [address]  [phone]    [facility]   [address] [place] [address]  [phone]      [place] Speaking  Facilities  [email or web address]  Learn more about these services  by contacting [phone]    [facility] (Some [place]ian speaking staff)   [address], [place] [address]  Phone: [phone]

## 2024-07-25 NOTE — ED NOTES
Spoke with HOST regarding update status - awaiting placement/acceptance at a placement for him. Breakfast tray given to pt     Airam Espinal RN  07/25/24 7828

## 2024-07-26 ENCOUNTER — DOCUMENTATION (OUTPATIENT)
Dept: CASE MANAGEMENT | Facility: HOSPITAL | Age: 29
End: 2024-07-26

## 2024-07-26 NOTE — CERTIFIED RECOVERY SPECIALIST
"Per HOST     Main HOST team [email or web address]>; Jeimy Bishop <[email or web address]>  It seems our evening shift, Reynaldo, did a three-way call with the client and [facility] admissions and he requested to be picked up at home, and they had a bed available for today. We set up transport to pick him up at 10am today and they already home address! \"    "

## 2024-12-28 ENCOUNTER — HOSPITAL ENCOUNTER (EMERGENCY)
Facility: HOSPITAL | Age: 29
Discharge: HOME | End: 2024-12-28
Attending: EMERGENCY MEDICINE | Admitting: EMERGENCY MEDICINE
Payer: COMMERCIAL

## 2024-12-28 ENCOUNTER — APPOINTMENT (EMERGENCY)
Dept: RADIOLOGY | Facility: HOSPITAL | Age: 29
End: 2024-12-28
Payer: COMMERCIAL

## 2024-12-28 VITALS
DIASTOLIC BLOOD PRESSURE: 80 MMHG | TEMPERATURE: 100.1 F | SYSTOLIC BLOOD PRESSURE: 152 MMHG | RESPIRATION RATE: 20 BRPM | OXYGEN SATURATION: 95 % | HEART RATE: 87 BPM | WEIGHT: 214.5 LBS | BODY MASS INDEX: 35.69 KG/M2

## 2024-12-28 DIAGNOSIS — B34.9 VIRAL ILLNESS: Primary | ICD-10-CM

## 2024-12-28 LAB
FLUAV AG UPPER RESP QL IA.RAPID: NEGATIVE
FLUBV AG UPPER RESP QL IA.RAPID: NEGATIVE
S PYO DNA THROAT QL NAA+PROBE: NOT DETECTED
SARS-COV+SARS-COV-2 AG RESP QL IA.RAPID: NEGATIVE

## 2024-12-28 PROCEDURE — 87651 STREP A DNA AMP PROBE: CPT

## 2024-12-28 PROCEDURE — 99283 EMERGENCY DEPT VISIT LOW MDM: CPT

## 2024-12-28 PROCEDURE — 87804 INFLUENZA ASSAY W/OPTIC: CPT

## 2024-12-28 PROCEDURE — 87811 SARS-COV-2 COVID19 W/OPTIC: CPT

## 2024-12-28 PROCEDURE — 71045 X-RAY EXAM CHEST 1 VIEW: CPT

## 2024-12-28 PROCEDURE — 99284 EMERGENCY DEPT VISIT MOD MDM: CPT

## 2024-12-28 RX ORDER — ACETAMINOPHEN 325 MG/1
650 TABLET ORAL EVERY 6 HOURS PRN
Qty: 60 TABLET | Refills: 0 | Status: SHIPPED | OUTPATIENT
Start: 2024-12-28

## 2024-12-28 RX ORDER — IBUPROFEN 600 MG/1
600 TABLET, FILM COATED ORAL ONCE
Status: COMPLETED | OUTPATIENT
Start: 2024-12-28 | End: 2024-12-28

## 2024-12-28 RX ORDER — ACETAMINOPHEN 325 MG/1
975 TABLET ORAL ONCE
Status: COMPLETED | OUTPATIENT
Start: 2024-12-28 | End: 2024-12-28

## 2024-12-28 RX ORDER — IBUPROFEN 400 MG/1
400 TABLET, FILM COATED ORAL EVERY 6 HOURS PRN
Qty: 30 TABLET | Refills: 0 | Status: SHIPPED | OUTPATIENT
Start: 2024-12-28

## 2024-12-28 RX ORDER — DEXTROMETHORPHAN HYDROBROMIDE AND PROMETHAZINE HYDROCHLORIDE 15; 6.25 MG/5ML; MG/5ML
5 SYRUP ORAL 4 TIMES DAILY PRN
Qty: 118 ML | Refills: 0 | Status: SHIPPED | OUTPATIENT
Start: 2024-12-28

## 2024-12-28 RX ADMIN — ACETAMINOPHEN 975 MG: 325 TABLET, FILM COATED ORAL at 14:37

## 2024-12-28 RX ADMIN — IBUPROFEN 600 MG: 600 TABLET, FILM COATED ORAL at 14:37

## 2024-12-28 NOTE — ED PROVIDER NOTES
"Time reflects when diagnosis was documented in both MDM as applicable and the Disposition within this note       Time User Action Codes Description Comment    12/28/2024  3:30 PM Argenis Nunez Add [B34.9] Viral illness           ED Disposition       ED Disposition   Discharge    Condition   Stable    Date/Time   Sat Dec 28, 2024  3:30 PM    Comment   Stew ForresterAllirefyakov discharge to home/self care.                   Assessment & Plan       Medical Decision Making  Differential diagnosis to include but not limited to: pneumonia, flu, covid, strep   Negative workup here in the ED. Fever did decrease with Tylenol and Motrin, prescribed this to the pharmacy and discussed supportive care options.     Pt stable at time of discharge, vital signs reviewed, questions answered. Strict ER return precautions provided/discussed and were well understood by patient. Patient's vitals, labs and/or imaging results, diagnosis, and treatment plan were discussed with the patient. All new and/or changed medications were discussed - specifically to include route of administration, how often to take, when to take, and the pharmacy they were sent to. Strict return precautions as well as close follow up with PCP was discussed with the patient and the patient was agreeable to my recommendations.  Patient verbally acknowledged understanding. All labs, imaging were reviewed and used in the medical decision making process (if ordered).     Portions of this chart may have been written with voice recognition software.  Occasional grammatical errors, wrong word or \"sound a like\" substitutions may have occurred due to software limitations.  Please read carefully and use context to recognize where substitutions have occurred.      Problems Addressed:  Viral illness: acute illness or injury    Amount and/or Complexity of Data Reviewed  Labs: ordered. Decision-making details documented in ED Course.  Radiology: ordered and independent " interpretation performed.     Details: Per my interpretation CXR without acute cardiopulmonary pathology.     Risk  OTC drugs.  Prescription drug management.        ED Course as of 12/28/24 1534   Sat Dec 28, 2024   1514 SARS COV Rapid Antigen: Negative   1515 Influenza A Rapid Antigen: Negative   1515 Influenza B Rapid Antigen: Negative   1521 STREP A PCR: Not Detected       Medications   acetaminophen (TYLENOL) tablet 975 mg (975 mg Oral Given 12/28/24 1437)   ibuprofen (MOTRIN) tablet 600 mg (600 mg Oral Given 12/28/24 1437)       ED Risk Strat Scores                                              History of Present Illness       Chief Complaint   Patient presents with    Flu Symptoms     Headache, vomiting, body aches since yesterday. Wife sick  with same.        History reviewed. No pertinent past medical history.   Past Surgical History:   Procedure Laterality Date    INCISION AND DRAINAGE OF WOUND Right 12/1/2022    Procedure: INCISION AND DRAINAGE (I&D) EXTREMITY;  Surgeon: Michaela Ibarra MD;  Location: [facility] OR;  Service: General      History reviewed. No pertinent family history.   Social History     Tobacco Use    Smoking status: Every Day     Current packs/day: 0.50     Types: Cigarettes    Smokeless tobacco: Never   Vaping Use    Vaping status: Never Used   Substance Use Topics    Alcohol use: Yes     Comment: Occ    Drug use: Yes     Types: Marijuana     Comment: K2. hx cocaine and heroin      E-Cigarette/Vaping    E-Cigarette Use Never User       E-Cigarette/Vaping Substances    Nicotine No     THC No     CBD No     Flavoring No     Other No     Unknown No       I have reviewed and agree with the history as documented.     Stew is a 29 year old male presenting to the ED for fever, chills, rhinorrhea, sore throat, cough, myalgias, weakness x 3-4 days. Does have a sick contact with similar symptoms. Denies nausea, vomiting, diarrhea. Has not taken any medications for symptom relief.          Review of Systems   Constitutional:  Positive for chills, fatigue and fever.   HENT:  Positive for rhinorrhea and sore throat. Negative for congestion.    Respiratory:  Positive for cough. Negative for shortness of breath.    Cardiovascular:  Negative for chest pain and palpitations.   Gastrointestinal:  Negative for abdominal pain, diarrhea, nausea and vomiting.   Musculoskeletal:  Positive for myalgias. Negative for neck pain and neck stiffness.   Skin:  Negative for rash.   Neurological:  Positive for headaches. Negative for dizziness, syncope and light-headedness.           Objective       ED Triage Vitals   Temperature Pulse Blood Pressure Respirations SpO2 Patient Position - Orthostatic VS   12/28/24 1422 12/28/24 1419 12/28/24 1419 12/28/24 1419 12/28/24 1419 12/28/24 1419   (!) 101 °F (38.3 °C) (!) 111 152/80 20 92 % Sitting      Temp Source Heart Rate Source BP Location FiO2 (%) Pain Score    12/28/24 1422 12/28/24 1419 12/28/24 1419 -- 12/28/24 1437    Oral Monitor Right arm  Med Not Given for Pain - for MAR use only      Vitals      Date and Time Temp Pulse SpO2 Resp BP Pain Score FACES Pain Rating User   12/28/24 1530 100.1 °F (37.8 °C) 87 95 % -- -- -- -- AB   12/28/24 1437 -- -- -- -- -- Med Not Given for Pain - for MAR use only -- AM   12/28/24 1422 101 °F (38.3 °C) -- -- -- -- -- -- SB   12/28/24 1419 -- 111 92 % 20 152/80 -- -- SB            Physical Exam  Vitals reviewed.   Constitutional:       General: He is not in acute distress.     Appearance: Normal appearance. He is ill-appearing. He is not toxic-appearing.   HENT:      Head: Normocephalic and atraumatic.      Right Ear: Tympanic membrane, ear canal and external ear normal.      Left Ear: Tympanic membrane, ear canal and external ear normal.      Nose: Rhinorrhea present.      Mouth/Throat:      Mouth: Mucous membranes are moist.      Pharynx: Uvula midline. Posterior oropharyngeal erythema present. No pharyngeal swelling,  oropharyngeal exudate, uvula swelling or postnasal drip.      Tonsils: No tonsillar exudate or tonsillar abscesses.   Eyes:      General: No scleral icterus.        Right eye: No discharge.         Left eye: No discharge.      Extraocular Movements: Extraocular movements intact.      Conjunctiva/sclera: Conjunctivae normal.   Cardiovascular:      Rate and Rhythm: Normal rate and regular rhythm.      Pulses: Normal pulses.      Heart sounds: Normal heart sounds.   Pulmonary:      Effort: Pulmonary effort is normal. No respiratory distress.      Breath sounds: Normal breath sounds.   Abdominal:      Palpations: Abdomen is soft.      Tenderness: There is no abdominal tenderness. There is no guarding.   Musculoskeletal:         General: Normal range of motion.      Cervical back: Normal range of motion and neck supple. No rigidity or tenderness.   Lymphadenopathy:      Cervical: Cervical adenopathy present.   Skin:     General: Skin is warm and dry.      Capillary Refill: Capillary refill takes less than 2 seconds.   Neurological:      General: No focal deficit present.      Mental Status: He is alert.   Psychiatric:         Mood and Affect: Mood normal.         Behavior: Behavior normal.         Results Reviewed       Procedure Component Value Units Date/Time    Strep A PCR [[id number]]  (Normal) Collected: 12/28/24 1437    Lab Status: Final result Specimen: Throat Updated: 12/28/24 1521     STREP A PCR Not Detected    FLU/COVID Rapid Antigen (30 min. TAT) - Preferred screening test in ED [[id number]]  (Normal) Collected: 12/28/24 1437    Lab Status: Final result Specimen: Nares from Nose Updated: 12/28/24 1510     SARS COV Rapid Antigen Negative     Influenza A Rapid Antigen Negative     Influenza B Rapid Antigen Negative    Narrative:      This test has been performed using the [employer] Debi 2 FLU+SARS Antigen test under the Emergency Use Authorization (EUA). This test has been validated by the  and verified  by the performing laboratory. The Debi uses lateral flow immunofluorescent sandwich assay to detect SARS-COV, Influenza A and Influenza B Antigen.     The Quidel Debi 2 SARS Antigen test does not differentiate between SARS-CoV and SARS-CoV-2.     Negative results are presumptive and may be confirmed with a molecular assay, if necessary, for patient management. Negative results do not rule out SARS-CoV-2 or influenza infection and should not be used as the sole basis for treatment or patient management decisions. A negative test result may occur if the level of antigen in a sample is below the limit of detection of this test.     Positive results are indicative of the presence of viral antigens, but do not rule out bacterial infection or co-infection with other viruses.     All test results should be used as an adjunct to clinical observations and other information available to the provider.    FOR PEDIATRIC PATIENTS - copy/paste COVID Guidelines URL to browser: [email or web address]            XR chest 1 view portable    (Results Pending)       Procedures    ED Medication and Procedure Management   Prior to Admission Medications   Prescriptions Last Dose Informant Patient Reported? Taking?   naloxone (NARCAN) 4 mg/0.1 mL nasal spray   No No   Sig: Administer 1 spray into a nostril. If no response after 2-3 minutes, give another dose in the other nostril using a new spray.      Facility-Administered Medications: None     Patient's Medications   Discharge Prescriptions    ACETAMINOPHEN (TYLENOL) 325 MG TABLET    Take 2 tablets (650 mg total) by mouth every 6 (six) hours as needed for mild pain       Start Date: 12/28/2024End Date: --       Order Dose: 650 mg       Quantity: 60 tablet    Refills: 0    IBUPROFEN (MOTRIN) 400 MG TABLET    Take 1 tablet (400 mg total) by mouth every 6 (six) hours as needed for mild pain       Start Date: 12/28/2024End Date: --        Order Dose: 400 mg       Quantity: 30 tablet    Refills: 0    PROMETHAZINE-DEXTROMETHORPHAN (PHENERGAN-DM) 6.25-15 MG/5 ML ORAL SYRUP    Take 5 mL by mouth 4 (four) times a day as needed for cough       Start Date: 12/28/2024End Date: --       Order Dose: 5 mL       Quantity: 118 mL    Refills: 0     No discharge procedures on file.  ED SEPSIS DOCUMENTATION   Time reflects when diagnosis was documented in both MDM as applicable and the Disposition within this note       Time User Action Codes Description Comment    12/28/2024  3:30 PM Argenis Nunez Add [B34.9] Viral illness                  Argenis Nunez PA-C  12/28/24 5244

## (undated) DEVICE — STERILE POLYISOPRENE POWDER-FREE SURGICAL GLOVES WITH EMOLLIENT COATING: Brand: PROTEXIS

## (undated) DEVICE — DISPOSABLE OR TOWEL: Brand: CARDINAL HEALTH

## (undated) DEVICE — STERILE POLYISOPRENE POWDER-FREE SURGICAL GLOVES: Brand: PROTEXIS

## (undated) DEVICE — INTENDED FOR TISSUE SEPARATION, AND OTHER PROCEDURES THAT REQUIRE A SHARP SURGICAL BLADE TO PUNCTURE OR CUT.: Brand: BARD-PARKER SAFETY BLADES SIZE 15, STERILE

## (undated) DEVICE — GAUZE SPONGES,16 PLY: Brand: CURITY

## (undated) DEVICE — POOLE SUCTION INSTRUMENT WITH REMOVABLE SHEATH AND PREATTACHED 6 FT. (1.8M) CLEAR PLASTIC TUBING: Brand: POOLE

## (undated) DEVICE — LIGHT GLOVE GREEN

## (undated) DEVICE — KERLIX BANDAGE ROLL: Brand: KERLIX

## (undated) DEVICE — ABDOMINAL PAD: Brand: DERMACEA

## (undated) DEVICE — NDL CNTR 20CT FM MAG: Brand: MEDLINE INDUSTRIES, INC.

## (undated) DEVICE — CURITY IDOFORM PACKING STRIP: Brand: CURITY

## (undated) DEVICE — PENCIL SMOKE EVAC TELESCOPING W/TUBING